# Patient Record
Sex: FEMALE | Race: WHITE | NOT HISPANIC OR LATINO | Employment: OTHER
[De-identification: names, ages, dates, MRNs, and addresses within clinical notes are randomized per-mention and may not be internally consistent; named-entity substitution may affect disease eponyms.]

---

## 2017-05-03 ENCOUNTER — RECORDS - HEALTHEAST (OUTPATIENT)
Dept: ADMINISTRATIVE | Facility: OTHER | Age: 67
End: 2017-05-03

## 2017-09-27 ENCOUNTER — RECORDS - HEALTHEAST (OUTPATIENT)
Dept: ADMINISTRATIVE | Facility: OTHER | Age: 67
End: 2017-09-27

## 2017-10-10 ENCOUNTER — RECORDS - HEALTHEAST (OUTPATIENT)
Dept: ADMINISTRATIVE | Facility: OTHER | Age: 67
End: 2017-10-10

## 2017-10-19 ENCOUNTER — RECORDS - HEALTHEAST (OUTPATIENT)
Dept: ADMINISTRATIVE | Facility: OTHER | Age: 67
End: 2017-10-19

## 2017-11-01 ENCOUNTER — RECORDS - HEALTHEAST (OUTPATIENT)
Dept: ADMINISTRATIVE | Facility: OTHER | Age: 67
End: 2017-11-01

## 2018-04-11 ENCOUNTER — RECORDS - HEALTHEAST (OUTPATIENT)
Dept: ADMINISTRATIVE | Facility: OTHER | Age: 68
End: 2018-04-11

## 2018-05-16 ENCOUNTER — RECORDS - HEALTHEAST (OUTPATIENT)
Dept: ADMINISTRATIVE | Facility: OTHER | Age: 68
End: 2018-05-16

## 2018-09-20 ENCOUNTER — OFFICE VISIT - HEALTHEAST (OUTPATIENT)
Dept: FAMILY MEDICINE | Facility: CLINIC | Age: 68
End: 2018-09-20

## 2018-09-20 DIAGNOSIS — H18.519 FUCHS' ENDOTHELIAL DYSTROPHY: ICD-10-CM

## 2018-09-20 DIAGNOSIS — B00.2 ORAL HERPES: ICD-10-CM

## 2018-09-20 DIAGNOSIS — E78.5 HYPERLIPIDEMIA: ICD-10-CM

## 2018-09-20 DIAGNOSIS — M19.90 OSTEOARTHRITIS: ICD-10-CM

## 2018-09-20 DIAGNOSIS — Z12.83 SKIN CANCER SCREENING: ICD-10-CM

## 2018-09-20 ASSESSMENT — MIFFLIN-ST. JEOR: SCORE: 1112.72

## 2018-10-04 ENCOUNTER — COMMUNICATION - HEALTHEAST (OUTPATIENT)
Dept: FAMILY MEDICINE | Facility: CLINIC | Age: 68
End: 2018-10-04

## 2018-11-02 ENCOUNTER — RECORDS - HEALTHEAST (OUTPATIENT)
Dept: ADMINISTRATIVE | Facility: OTHER | Age: 68
End: 2018-11-02

## 2018-11-06 ENCOUNTER — RECORDS - HEALTHEAST (OUTPATIENT)
Dept: ADMINISTRATIVE | Facility: OTHER | Age: 68
End: 2018-11-06

## 2018-11-07 ENCOUNTER — RECORDS - HEALTHEAST (OUTPATIENT)
Dept: ADMINISTRATIVE | Facility: OTHER | Age: 68
End: 2018-11-07

## 2018-11-16 ENCOUNTER — RECORDS - HEALTHEAST (OUTPATIENT)
Dept: ADMINISTRATIVE | Facility: OTHER | Age: 68
End: 2018-11-16

## 2018-12-18 ENCOUNTER — OFFICE VISIT - HEALTHEAST (OUTPATIENT)
Dept: FAMILY MEDICINE | Facility: CLINIC | Age: 68
End: 2018-12-18

## 2018-12-18 DIAGNOSIS — Z01.818 ENCOUNTER FOR PREOPERATIVE EXAMINATION FOR GENERAL SURGICAL PROCEDURE: ICD-10-CM

## 2018-12-18 DIAGNOSIS — M75.101 TEAR OF RIGHT ROTATOR CUFF, UNSPECIFIED TEAR EXTENT: ICD-10-CM

## 2018-12-18 LAB
ATRIAL RATE - MUSE: 59 BPM
DIASTOLIC BLOOD PRESSURE - MUSE: NORMAL MMHG
ERYTHROCYTE [DISTWIDTH] IN BLOOD BY AUTOMATED COUNT: 11.6 % (ref 11–14.5)
HCT VFR BLD AUTO: 37.9 % (ref 35–47)
HGB BLD-MCNC: 12.5 G/DL (ref 12–16)
INTERPRETATION ECG - MUSE: NORMAL
MCH RBC QN AUTO: 28.9 PG (ref 27–34)
MCHC RBC AUTO-ENTMCNC: 33 G/DL (ref 32–36)
MCV RBC AUTO: 88 FL (ref 80–100)
P AXIS - MUSE: 39 DEGREES
PLATELET # BLD AUTO: 406 THOU/UL (ref 140–440)
PMV BLD AUTO: 6.7 FL (ref 7–10)
PR INTERVAL - MUSE: 150 MS
QRS DURATION - MUSE: 86 MS
QT - MUSE: 422 MS
QTC - MUSE: 417 MS
R AXIS - MUSE: 18 DEGREES
RBC # BLD AUTO: 4.33 MILL/UL (ref 3.8–5.4)
SYSTOLIC BLOOD PRESSURE - MUSE: NORMAL MMHG
T AXIS - MUSE: 48 DEGREES
VENTRICULAR RATE- MUSE: 59 BPM
WBC: 8.3 THOU/UL (ref 4–11)

## 2018-12-18 ASSESSMENT — MIFFLIN-ST. JEOR: SCORE: 1137.67

## 2018-12-19 ENCOUNTER — COMMUNICATION - HEALTHEAST (OUTPATIENT)
Dept: FAMILY MEDICINE | Facility: CLINIC | Age: 68
End: 2018-12-19

## 2019-01-02 ENCOUNTER — RECORDS - HEALTHEAST (OUTPATIENT)
Dept: ADMINISTRATIVE | Facility: OTHER | Age: 69
End: 2019-01-02

## 2019-01-11 ENCOUNTER — RECORDS - HEALTHEAST (OUTPATIENT)
Dept: ADMINISTRATIVE | Facility: OTHER | Age: 69
End: 2019-01-11

## 2019-02-12 ENCOUNTER — RECORDS - HEALTHEAST (OUTPATIENT)
Dept: ADMINISTRATIVE | Facility: OTHER | Age: 69
End: 2019-02-12

## 2019-04-09 ENCOUNTER — RECORDS - HEALTHEAST (OUTPATIENT)
Dept: ADMINISTRATIVE | Facility: OTHER | Age: 69
End: 2019-04-09

## 2019-04-11 ENCOUNTER — COMMUNICATION - HEALTHEAST (OUTPATIENT)
Dept: FAMILY MEDICINE | Facility: CLINIC | Age: 69
End: 2019-04-11

## 2019-04-11 DIAGNOSIS — Z12.31 ENCOUNTER FOR SCREENING MAMMOGRAM FOR BREAST CANCER: ICD-10-CM

## 2019-04-26 ENCOUNTER — RECORDS - HEALTHEAST (OUTPATIENT)
Dept: ADMINISTRATIVE | Facility: OTHER | Age: 69
End: 2019-04-26

## 2019-04-29 ENCOUNTER — COMMUNICATION - HEALTHEAST (OUTPATIENT)
Dept: FAMILY MEDICINE | Facility: CLINIC | Age: 69
End: 2019-04-29

## 2019-04-29 DIAGNOSIS — B00.2 ORAL HERPES: ICD-10-CM

## 2019-05-21 ENCOUNTER — HOSPITAL ENCOUNTER (OUTPATIENT)
Dept: MAMMOGRAPHY | Facility: CLINIC | Age: 69
Discharge: HOME | End: 2019-05-21
Attending: NURSE PRACTITIONER

## 2019-05-21 DIAGNOSIS — Z12.31 ENCOUNTER FOR SCREENING MAMMOGRAM FOR BREAST CANCER: ICD-10-CM

## 2019-07-12 ENCOUNTER — RECORDS - HEALTHEAST (OUTPATIENT)
Dept: ADMINISTRATIVE | Facility: OTHER | Age: 69
End: 2019-07-12

## 2019-08-09 ENCOUNTER — RECORDS - HEALTHEAST (OUTPATIENT)
Dept: ADMINISTRATIVE | Facility: OTHER | Age: 69
End: 2019-08-09

## 2019-08-09 ENCOUNTER — RECORDS - HEALTHEAST (OUTPATIENT)
Dept: RADIOLOGY | Facility: CLINIC | Age: 69
End: 2019-08-09

## 2019-09-10 ENCOUNTER — OFFICE VISIT - HEALTHEAST (OUTPATIENT)
Dept: FAMILY MEDICINE | Facility: CLINIC | Age: 69
End: 2019-09-10

## 2019-09-10 DIAGNOSIS — Q76.49 SACRALIZATION OF LUMBAR VERTEBRA: ICD-10-CM

## 2019-09-10 DIAGNOSIS — E78.2 MIXED HYPERLIPIDEMIA: ICD-10-CM

## 2019-09-10 DIAGNOSIS — Z13.1 SCREENING FOR DIABETES MELLITUS: ICD-10-CM

## 2019-09-10 DIAGNOSIS — Z13.21 ENCOUNTER FOR VITAMIN DEFICIENCY SCREENING: ICD-10-CM

## 2019-09-10 DIAGNOSIS — D17.30 LIPOMA OF SKIN AND SUBCUTANEOUS TISSUE: ICD-10-CM

## 2019-09-10 DIAGNOSIS — Z00.00 ROUTINE GENERAL MEDICAL EXAMINATION AT A HEALTH CARE FACILITY: ICD-10-CM

## 2019-09-10 DIAGNOSIS — Z23 ENCOUNTER FOR IMMUNIZATION: ICD-10-CM

## 2019-09-10 DIAGNOSIS — R01.1 HEART MURMUR, SYSTOLIC: ICD-10-CM

## 2019-09-10 DIAGNOSIS — R63.5 WEIGHT GAIN: ICD-10-CM

## 2019-09-10 DIAGNOSIS — Z71.85 IMMUNIZATION COUNSELING: ICD-10-CM

## 2019-09-10 ASSESSMENT — MIFFLIN-ST. JEOR: SCORE: 1132.57

## 2019-09-11 ENCOUNTER — AMBULATORY - HEALTHEAST (OUTPATIENT)
Dept: LAB | Facility: CLINIC | Age: 69
End: 2019-09-11

## 2019-09-11 DIAGNOSIS — Z71.85 IMMUNIZATION COUNSELING: ICD-10-CM

## 2019-09-11 DIAGNOSIS — Z13.1 SCREENING FOR DIABETES MELLITUS: ICD-10-CM

## 2019-09-11 DIAGNOSIS — E78.2 MIXED HYPERLIPIDEMIA: ICD-10-CM

## 2019-09-11 DIAGNOSIS — R63.5 WEIGHT GAIN: ICD-10-CM

## 2019-09-11 DIAGNOSIS — Z13.21 ENCOUNTER FOR VITAMIN DEFICIENCY SCREENING: ICD-10-CM

## 2019-09-11 LAB
ALBUMIN SERPL-MCNC: 4 G/DL (ref 3.5–5)
ALP SERPL-CCNC: 91 U/L (ref 45–120)
ALT SERPL W P-5'-P-CCNC: 26 U/L (ref 0–45)
ANION GAP SERPL CALCULATED.3IONS-SCNC: 8 MMOL/L (ref 5–18)
AST SERPL W P-5'-P-CCNC: 22 U/L (ref 0–40)
BILIRUB SERPL-MCNC: 0.4 MG/DL (ref 0–1)
BUN SERPL-MCNC: 16 MG/DL (ref 8–22)
CALCIUM SERPL-MCNC: 9.9 MG/DL (ref 8.5–10.5)
CHLORIDE BLD-SCNC: 109 MMOL/L (ref 98–107)
CHOLEST SERPL-MCNC: 205 MG/DL
CO2 SERPL-SCNC: 27 MMOL/L (ref 22–31)
CREAT SERPL-MCNC: 0.71 MG/DL (ref 0.6–1.1)
FASTING STATUS PATIENT QL REPORTED: YES
GFR SERPL CREATININE-BSD FRML MDRD: >60 ML/MIN/1.73M2
GLUCOSE BLD-MCNC: 92 MG/DL (ref 70–125)
HDLC SERPL-MCNC: 63 MG/DL
LDLC SERPL CALC-MCNC: 99 MG/DL
POTASSIUM BLD-SCNC: 5 MMOL/L (ref 3.5–5)
PROT SERPL-MCNC: 6.7 G/DL (ref 6–8)
SODIUM SERPL-SCNC: 144 MMOL/L (ref 136–145)
TRIGL SERPL-MCNC: 217 MG/DL
TSH SERPL DL<=0.005 MIU/L-ACNC: 1.31 UIU/ML (ref 0.3–5)

## 2019-09-12 LAB
25(OH)D3 SERPL-MCNC: 40.8 NG/ML (ref 30–80)
25(OH)D3 SERPL-MCNC: 40.8 NG/ML (ref 30–80)

## 2019-09-13 ENCOUNTER — COMMUNICATION - HEALTHEAST (OUTPATIENT)
Dept: FAMILY MEDICINE | Facility: CLINIC | Age: 69
End: 2019-09-13

## 2019-09-13 LAB — HAV IGG SER QL IA: POSITIVE

## 2019-09-17 ENCOUNTER — AMBULATORY - HEALTHEAST (OUTPATIENT)
Dept: FAMILY MEDICINE | Facility: CLINIC | Age: 69
End: 2019-09-17

## 2019-09-17 ENCOUNTER — COMMUNICATION - HEALTHEAST (OUTPATIENT)
Dept: FAMILY MEDICINE | Facility: CLINIC | Age: 69
End: 2019-09-17

## 2019-09-17 ENCOUNTER — HOSPITAL ENCOUNTER (OUTPATIENT)
Dept: CARDIOLOGY | Facility: CLINIC | Age: 69
Discharge: HOME | End: 2019-09-17
Attending: NURSE PRACTITIONER

## 2019-09-17 DIAGNOSIS — H91.93 BILATERAL HEARING LOSS, UNSPECIFIED HEARING LOSS TYPE: ICD-10-CM

## 2019-09-17 DIAGNOSIS — R01.1 HEART MURMUR, SYSTOLIC: ICD-10-CM

## 2019-09-17 DIAGNOSIS — I35.0 NONRHEUMATIC AORTIC VALVE STENOSIS: ICD-10-CM

## 2019-09-17 DIAGNOSIS — D17.30 LIPOMA OF SKIN AND SUBCUTANEOUS TISSUE: ICD-10-CM

## 2019-09-17 LAB
AORTIC ROOT: 3 CM
AORTIC VALVE MEAN VELOCITY: 170 CM/S
AV DIMENSIONLESS INDEX VTI: 0.4
AV MEAN GRADIENT: 13 MMHG
AV PEAK GRADIENT: 24.2 MMHG
AV VALVE AREA: 0.9 CM2
AV VELOCITY RATIO: 0.4
BSA FOR ECHO PROCEDURE: 1.71 M2
CV BLOOD PRESSURE: NORMAL MMHG
CV ECHO HEIGHT: 59 IN
CV ECHO WEIGHT: 155 LBS
DOP CALC AO PEAK VEL: 246 CM/S
DOP CALC AO VTI: 58.6 CM
DOP CALC LVOT AREA: 2.01 CM2
DOP CALC LVOT DIAMETER: 1.6 CM
DOP CALC LVOT PEAK VEL: 97.8 CM/S
DOP CALC LVOT STROKE VOLUME: 51 CM3
DOP CALCLVOT PEAK VEL VTI: 25.4 CM
EJECTION FRACTION: 72 % (ref 55–75)
FRACTIONAL SHORTENING: 33.4 % (ref 28–44)
INTERVENTRICULAR SEPTUM IN END DIASTOLE: 0.88 CM (ref 0.6–0.9)
IVS/PW RATIO: 1.3
LA AREA 1: 18.4 CM2
LA AREA 2: 18.2 CM2
LEFT ATRIUM LENGTH: 5.34 CM
LEFT ATRIUM SIZE: 3.4 CM
LEFT ATRIUM TO AORTIC ROOT RATIO: 1.13 NO UNITS
LEFT ATRIUM VOLUME INDEX: 31.2 ML/M2
LEFT ATRIUM VOLUME: 53.3 ML
LEFT VENTRICLE CARDIAC INDEX: 1.9 L/MIN/M2
LEFT VENTRICLE CARDIAC OUTPUT: 3.3 L/MIN
LEFT VENTRICLE DIASTOLIC VOLUME INDEX: 48.5 CM3/M2 (ref 29–61)
LEFT VENTRICLE DIASTOLIC VOLUME: 83 CM3 (ref 46–106)
LEFT VENTRICLE HEART RATE: 64 BPM
LEFT VENTRICLE MASS INDEX: 66.6 G/M2
LEFT VENTRICLE SYSTOLIC VOLUME INDEX: 13.5 CM3/M2 (ref 8–24)
LEFT VENTRICLE SYSTOLIC VOLUME: 23 CM3 (ref 14–42)
LEFT VENTRICULAR INTERNAL DIMENSION IN DIASTOLE: 4.55 CM (ref 3.8–5.2)
LEFT VENTRICULAR INTERNAL DIMENSION IN SYSTOLE: 3.03 CM (ref 2.2–3.5)
LEFT VENTRICULAR MASS: 113.9 G
LEFT VENTRICULAR OUTFLOW TRACT MEAN GRADIENT: 2 MMHG
LEFT VENTRICULAR OUTFLOW TRACT MEAN VELOCITY: 70.2 CM/S
LEFT VENTRICULAR OUTFLOW TRACT PEAK GRADIENT: 4 MMHG
LEFT VENTRICULAR POSTERIOR WALL IN END DIASTOLE: 0.7 CM (ref 0.6–0.9)
LV STROKE VOLUME INDEX: 29.9 ML/M2
MITRAL VALVE E/A RATIO: 1.2
MV AVERAGE E/E' RATIO: 9.7 CM/S
MV DECELERATION TIME: 208 MS
MV E'TISSUE VEL-LAT: 11.8 CM/S
MV E'TISSUE VEL-MED: 7.31 CM/S
MV LATERAL E/E' RATIO: 7.9
MV MEDIAL E/E' RATIO: 12.7
MV PEAK A VELOCITY: 75 CM/S
MV PEAK E VELOCITY: 92.8 CM/S
NUC REST DIASTOLIC VOLUME INDEX: 2476 LBS
NUC REST SYSTOLIC VOLUME INDEX: 59 IN
PR MAX PG: 2 MMHG
PR PEAK VELOCITY: 80.1 CM/S
TRICUSPID REGURGITATION PEAK PRESSURE GRADIENT: 17.3 MMHG
TRICUSPID VALVE ANULAR PLANE SYSTOLIC EXCURSION: 2.3 CM
TRICUSPID VALVE PEAK REGURGITANT VELOCITY: 208 CM/S

## 2019-09-17 ASSESSMENT — MIFFLIN-ST. JEOR: SCORE: 1132.57

## 2019-09-24 ENCOUNTER — TELEPHONE (OUTPATIENT)
Dept: SURGERY | Facility: CLINIC | Age: 69
End: 2019-09-24

## 2019-09-24 ENCOUNTER — PREP FOR PROCEDURE (OUTPATIENT)
Dept: SURGERY | Facility: CLINIC | Age: 69
End: 2019-09-24

## 2019-09-24 ENCOUNTER — OFFICE VISIT (OUTPATIENT)
Dept: SURGERY | Facility: CLINIC | Age: 69
End: 2019-09-24
Payer: COMMERCIAL

## 2019-09-24 VITALS
HEIGHT: 59 IN | BODY MASS INDEX: 30.24 KG/M2 | HEART RATE: 86 BPM | SYSTOLIC BLOOD PRESSURE: 112 MMHG | OXYGEN SATURATION: 97 % | WEIGHT: 150 LBS | DIASTOLIC BLOOD PRESSURE: 68 MMHG

## 2019-09-24 DIAGNOSIS — D17.30 LIPOMA OF SKIN AND SUBCUTANEOUS TISSUE: Primary | ICD-10-CM

## 2019-09-24 DIAGNOSIS — R22.2 CHEST MASS: Primary | ICD-10-CM

## 2019-09-24 PROCEDURE — 99203 OFFICE O/P NEW LOW 30 MIN: CPT | Performed by: SURGERY

## 2019-09-24 RX ORDER — ROSUVASTATIN CALCIUM 10 MG/1
10 TABLET, COATED ORAL EVERY OTHER DAY
COMMUNITY
Start: 2018-09-20 | End: 2022-02-09

## 2019-09-24 RX ORDER — OMEGA-3S/DHA/EPA/FISH OIL/D3 300MG-1000
CAPSULE ORAL
COMMUNITY
End: 2019-11-04

## 2019-09-24 ASSESSMENT — MIFFLIN-ST. JEOR: SCORE: 1116.03

## 2019-09-24 NOTE — PROGRESS NOTES
"Assessment:    Pamela Engle is a 69 year old female seen in consultation for a subcutaneous mass on her upper torso, likely a lipoma., at the request of Jeremy Tucker RN.      Plan:  I have offered Ms. Engle continued observation versus excision and she elects to proceed with excision.  Due to the size and location of the subcutaneous mass, I recommended excision in the operating room as opposed to the clinic.        HPI:  Pamela Engle is a 69 year old female who presents today in consultation for a subcutaneous mass overlying her sternum which has been growing in size over the past year.  It is painless, mobile, and has no overlying skin changes.  She has a history of a lipoma on her left shoulder which was excised approximately 7 years ago and this feels very similar to her.     Duration:  1 year   H/o trauma:  No  Other such masses now or in the past:  Yes: right posterior shoulder lipoma excised 2012  Pain:  No  Size:  slowly increasing, currently ~ 4 cm in diameter and 1 cm in depth.     PMH:  Aortic stenosis  Hyperlipidemia  Oral herpes simplex  Osteoarthritis    PSH:  Lipoma excision, right posterior shoulder    Allergies:  No known allergies    Home Medications:  Rosuvastatin  Vitamin D  Valacyclovir    Social History:  Social History     Tobacco Use     Smoking status: Former Smoker     Packs/day: 0.00     Smokeless tobacco: Never Used   Substance Use Topics     Alcohol use: Yes     Drug use: Never     Former smoker, quit 2010    Family History:  No anesthesia reactions or bleeding disorders    ROS:  The 10 point review of systems is negative other than noted in the HPI and above.    PE:  /68   Pulse 86   Ht 1.499 m (4' 11\")   Wt 68 kg (150 lb)   SpO2 97%   BMI 30.30 kg/m    General appearance: well-nourished, no apparent distress  HEENT:  Head normocephalic and atraumatic, pupils equal and round, conjunctivae clear, no scleral icterus, mucous membranes moist, external ears and nose " normal  Lungs: respirations unlabored  Neurologic: alert, speech is clear, moves all extremities with good strength  Psychiatric: mood and affect are appropriate  Musculoskeletal:  Normal station and gait  Skin: There is a ~ 4 x 4 x 1 cm mass overlying her upper sternum which is soft, mobile, and nontender.  There is no surrounding cellulitis.       Karla Vasquez MD      Please route or send letter to:  Primary Care Provider (PCP) and Referring Provider

## 2019-09-24 NOTE — TELEPHONE ENCOUNTER
Type of surgery: EXCISION OF SUBCUTANEOUS MASS UPPER CHEST MIDLINE   Location of surgery: [facility]  Date and time of surgery: 11/5/2019 @ 9:30 AM   Surgeon: Karla Vasquez MD   Pre-Op Appt Date: PATIENT TO SCHEDULE    Post-Op Appt Date: PATIENT TO SCHEDULE     Packet sent out: Yes  PACKET AND SOAP GIVEN TO PATIENT    Pre-cert/Authorization completed:  Not Applicable  Date: 9/24/2019  UPDATED 9/27/2019     EXCISION OF SUBCUTANEOUS MASS UPPER CHEST  MIDLINE     MAC PT INST TO HAVE H&P WITH DR BRITO 60 MIN REQ  NON MGB NMS

## 2019-10-04 ENCOUNTER — OFFICE VISIT - HEALTHEAST (OUTPATIENT)
Dept: AUDIOLOGY | Facility: CLINIC | Age: 69
End: 2019-10-04

## 2019-10-04 DIAGNOSIS — Z01.110 ENCOUNTER FOR HEARING EXAMINATION FOLLOWING FAILED HEARING SCREENING: ICD-10-CM

## 2019-10-08 ENCOUNTER — OFFICE VISIT - HEALTHEAST (OUTPATIENT)
Dept: CARDIOLOGY | Facility: CLINIC | Age: 69
End: 2019-10-08

## 2019-10-08 DIAGNOSIS — I35.0 NONRHEUMATIC AORTIC VALVE STENOSIS: ICD-10-CM

## 2019-10-31 ENCOUNTER — OFFICE VISIT - HEALTHEAST (OUTPATIENT)
Dept: FAMILY MEDICINE | Facility: CLINIC | Age: 69
End: 2019-10-31

## 2019-10-31 DIAGNOSIS — Z01.818 ENCOUNTER FOR PREOPERATIVE EXAMINATION FOR GENERAL SURGICAL PROCEDURE: ICD-10-CM

## 2019-10-31 DIAGNOSIS — D17.30 LIPOMA OF SKIN AND SUBCUTANEOUS TISSUE: ICD-10-CM

## 2019-10-31 DIAGNOSIS — I35.0 NONRHEUMATIC AORTIC VALVE STENOSIS: ICD-10-CM

## 2019-10-31 LAB
ATRIAL RATE - MUSE: 59 BPM
DIASTOLIC BLOOD PRESSURE - MUSE: NORMAL
HGB BLD-MCNC: 13.4 G/DL (ref 12–16)
INTERPRETATION ECG - MUSE: NORMAL
P AXIS - MUSE: 46 DEGREES
PR INTERVAL - MUSE: 148 MS
QRS DURATION - MUSE: 84 MS
QT - MUSE: 430 MS
QTC - MUSE: 425 MS
R AXIS - MUSE: 15 DEGREES
SYSTOLIC BLOOD PRESSURE - MUSE: NORMAL
T AXIS - MUSE: 56 DEGREES
VENTRICULAR RATE- MUSE: 59 BPM

## 2019-10-31 ASSESSMENT — MIFFLIN-ST. JEOR: SCORE: 1149.58

## 2019-11-04 RX ORDER — VALACYCLOVIR HYDROCHLORIDE 1 G/1
2000 TABLET, FILM COATED ORAL 2 TIMES DAILY PRN
COMMUNITY
End: 2021-08-10

## 2019-11-04 RX ORDER — ACETAMINOPHEN 160 MG
TABLET,DISINTEGRATING ORAL DAILY
COMMUNITY

## 2019-11-04 RX ORDER — IBUPROFEN 800 MG/1
800 TABLET, FILM COATED ORAL EVERY 8 HOURS PRN
Status: ON HOLD | COMMUNITY
End: 2019-11-05

## 2019-11-04 RX ORDER — ACETAMINOPHEN 500 MG
500 TABLET ORAL EVERY 6 HOURS PRN
Status: ON HOLD | COMMUNITY
End: 2019-11-05

## 2019-11-05 ENCOUNTER — APPOINTMENT (OUTPATIENT)
Dept: SURGERY | Facility: PHYSICIAN GROUP | Age: 69
End: 2019-11-05
Payer: COMMERCIAL

## 2019-11-05 ENCOUNTER — ANESTHESIA EVENT (OUTPATIENT)
Dept: SURGERY | Facility: CLINIC | Age: 69
End: 2019-11-05
Payer: COMMERCIAL

## 2019-11-05 ENCOUNTER — ANESTHESIA (OUTPATIENT)
Dept: SURGERY | Facility: CLINIC | Age: 69
End: 2019-11-05
Payer: COMMERCIAL

## 2019-11-05 ENCOUNTER — HOSPITAL ENCOUNTER (OUTPATIENT)
Facility: CLINIC | Age: 69
Discharge: HOME | End: 2019-11-05
Attending: SURGERY | Admitting: SURGERY
Payer: COMMERCIAL

## 2019-11-05 VITALS
RESPIRATION RATE: 14 BRPM | SYSTOLIC BLOOD PRESSURE: 125 MMHG | BODY MASS INDEX: 31.25 KG/M2 | OXYGEN SATURATION: 100 % | WEIGHT: 155 LBS | TEMPERATURE: 98.3 F | HEIGHT: 59 IN | DIASTOLIC BLOOD PRESSURE: 77 MMHG

## 2019-11-05 DIAGNOSIS — R22.2 CHEST MASS: ICD-10-CM

## 2019-11-05 PROCEDURE — 25000128 H RX IP 250 OP 636: Performed by: PHYSICIAN ASSISTANT

## 2019-11-05 PROCEDURE — 21552 EXC NECK LES SC 3 CM/>: CPT | Performed by: SURGERY

## 2019-11-05 PROCEDURE — 25000125 ZZHC RX 250: Performed by: SURGERY

## 2019-11-05 PROCEDURE — 37000008 ZZH ANESTHESIA TECHNICAL FEE, 1ST 30 MIN: Performed by: SURGERY

## 2019-11-05 PROCEDURE — 25000128 H RX IP 250 OP 636: Performed by: NURSE ANESTHETIST, CERTIFIED REGISTERED

## 2019-11-05 PROCEDURE — 40000306 ZZH STATISTIC PRE PROC ASSESS II: Performed by: SURGERY

## 2019-11-05 PROCEDURE — 25000132 ZZH RX MED GY IP 250 OP 250 PS 637: Performed by: SURGERY

## 2019-11-05 PROCEDURE — 25800030 ZZH RX IP 258 OP 636: Performed by: ANESTHESIOLOGY

## 2019-11-05 PROCEDURE — 37000009 ZZH ANESTHESIA TECHNICAL FEE, EACH ADDTL 15 MIN: Performed by: SURGERY

## 2019-11-05 PROCEDURE — 88304 TISSUE EXAM BY PATHOLOGIST: CPT | Performed by: SURGERY

## 2019-11-05 PROCEDURE — 36000052 ZZH SURGERY LEVEL 2 EA 15 ADDTL MIN: Performed by: SURGERY

## 2019-11-05 PROCEDURE — 36000050 ZZH SURGERY LEVEL 2 1ST 30 MIN: Performed by: SURGERY

## 2019-11-05 PROCEDURE — 27210794 ZZH OR GENERAL SUPPLY STERILE: Performed by: SURGERY

## 2019-11-05 PROCEDURE — 88304 TISSUE EXAM BY PATHOLOGIST: CPT | Mod: 26 | Performed by: SURGERY

## 2019-11-05 PROCEDURE — 25000128 H RX IP 250 OP 636: Performed by: SURGERY

## 2019-11-05 PROCEDURE — 25000125 ZZHC RX 250: Performed by: NURSE ANESTHETIST, CERTIFIED REGISTERED

## 2019-11-05 PROCEDURE — 71000027 ZZH RECOVERY PHASE 2 EACH 15 MINS: Performed by: SURGERY

## 2019-11-05 RX ORDER — FENTANYL CITRATE 50 UG/ML
25-50 INJECTION, SOLUTION INTRAMUSCULAR; INTRAVENOUS
Status: DISCONTINUED | OUTPATIENT
Start: 2019-11-05 | End: 2019-11-05 | Stop reason: HOSPADM

## 2019-11-05 RX ORDER — ONDANSETRON 4 MG/1
4 TABLET, ORALLY DISINTEGRATING ORAL EVERY 30 MIN PRN
Status: DISCONTINUED | OUTPATIENT
Start: 2019-11-05 | End: 2019-11-05 | Stop reason: HOSPADM

## 2019-11-05 RX ORDER — CEFAZOLIN SODIUM 2 G/100ML
2 INJECTION, SOLUTION INTRAVENOUS
Status: COMPLETED | OUTPATIENT
Start: 2019-11-05 | End: 2019-11-05

## 2019-11-05 RX ORDER — OXYCODONE HYDROCHLORIDE 5 MG/1
5-10 TABLET ORAL EVERY 4 HOURS PRN
Qty: 6 TABLET | Refills: 0 | Status: SHIPPED | OUTPATIENT
Start: 2019-11-05 | End: 2019-11-19

## 2019-11-05 RX ORDER — LIDOCAINE 40 MG/G
CREAM TOPICAL
Status: DISCONTINUED | OUTPATIENT
Start: 2019-11-05 | End: 2019-11-05 | Stop reason: HOSPADM

## 2019-11-05 RX ORDER — PROPOFOL 10 MG/ML
INJECTION, EMULSION INTRAVENOUS PRN
Status: DISCONTINUED | OUTPATIENT
Start: 2019-11-05 | End: 2019-11-05

## 2019-11-05 RX ORDER — CEFAZOLIN SODIUM 1 G/3ML
1 INJECTION, POWDER, FOR SOLUTION INTRAMUSCULAR; INTRAVENOUS SEE ADMIN INSTRUCTIONS
Status: DISCONTINUED | OUTPATIENT
Start: 2019-11-05 | End: 2019-11-05 | Stop reason: HOSPADM

## 2019-11-05 RX ORDER — ACETAMINOPHEN 325 MG/1
650 TABLET ORAL
Status: DISCONTINUED | OUTPATIENT
Start: 2019-11-05 | End: 2019-11-05 | Stop reason: HOSPADM

## 2019-11-05 RX ORDER — SODIUM CHLORIDE, SODIUM LACTATE, POTASSIUM CHLORIDE, CALCIUM CHLORIDE 600; 310; 30; 20 MG/100ML; MG/100ML; MG/100ML; MG/100ML
INJECTION, SOLUTION INTRAVENOUS CONTINUOUS
Status: DISCONTINUED | OUTPATIENT
Start: 2019-11-05 | End: 2019-11-05 | Stop reason: HOSPADM

## 2019-11-05 RX ORDER — NALOXONE HYDROCHLORIDE 0.4 MG/ML
.1-.4 INJECTION, SOLUTION INTRAMUSCULAR; INTRAVENOUS; SUBCUTANEOUS
Status: DISCONTINUED | OUTPATIENT
Start: 2019-11-05 | End: 2019-11-05 | Stop reason: HOSPADM

## 2019-11-05 RX ORDER — ONDANSETRON 2 MG/ML
4 INJECTION INTRAMUSCULAR; INTRAVENOUS EVERY 30 MIN PRN
Status: DISCONTINUED | OUTPATIENT
Start: 2019-11-05 | End: 2019-11-05 | Stop reason: HOSPADM

## 2019-11-05 RX ORDER — FENTANYL CITRATE 50 UG/ML
INJECTION, SOLUTION INTRAMUSCULAR; INTRAVENOUS PRN
Status: DISCONTINUED | OUTPATIENT
Start: 2019-11-05 | End: 2019-11-05

## 2019-11-05 RX ORDER — OXYCODONE HYDROCHLORIDE 5 MG/1
10 TABLET ORAL
Status: COMPLETED | OUTPATIENT
Start: 2019-11-05 | End: 2019-11-05

## 2019-11-05 RX ORDER — ACETAMINOPHEN 500 MG
1000 TABLET ORAL EVERY 6 HOURS PRN
Qty: 50 TABLET | Refills: 0 | Status: SHIPPED | OUTPATIENT
Start: 2019-11-05 | End: 2023-04-06

## 2019-11-05 RX ORDER — MEPERIDINE HYDROCHLORIDE 25 MG/ML
12.5 INJECTION INTRAMUSCULAR; INTRAVENOUS; SUBCUTANEOUS
Status: DISCONTINUED | OUTPATIENT
Start: 2019-11-05 | End: 2019-11-05 | Stop reason: HOSPADM

## 2019-11-05 RX ORDER — IBUPROFEN 200 MG
600 TABLET ORAL EVERY 6 HOURS PRN
Qty: 50 TABLET | Refills: 0 | Status: SHIPPED | OUTPATIENT
Start: 2019-11-05

## 2019-11-05 RX ORDER — PROPOFOL 10 MG/ML
INJECTION, EMULSION INTRAVENOUS CONTINUOUS PRN
Status: DISCONTINUED | OUTPATIENT
Start: 2019-11-05 | End: 2019-11-05

## 2019-11-05 RX ADMIN — PROPOFOL 10 MG: 10 INJECTION, EMULSION INTRAVENOUS at 09:39

## 2019-11-05 RX ADMIN — PROPOFOL 20 MG: 10 INJECTION, EMULSION INTRAVENOUS at 09:37

## 2019-11-05 RX ADMIN — MIDAZOLAM 2 MG: 1 INJECTION INTRAMUSCULAR; INTRAVENOUS at 09:11

## 2019-11-05 RX ADMIN — PROPOFOL 20 MG: 10 INJECTION, EMULSION INTRAVENOUS at 09:29

## 2019-11-05 RX ADMIN — PROPOFOL 20 MG: 10 INJECTION, EMULSION INTRAVENOUS at 09:23

## 2019-11-05 RX ADMIN — OXYCODONE HYDROCHLORIDE 5 MG: 5 TABLET ORAL at 11:24

## 2019-11-05 RX ADMIN — PROPOFOL 50 MCG/KG/MIN: 10 INJECTION, EMULSION INTRAVENOUS at 09:27

## 2019-11-05 RX ADMIN — FENTANYL CITRATE 50 MCG: 50 INJECTION, SOLUTION INTRAMUSCULAR; INTRAVENOUS at 09:33

## 2019-11-05 RX ADMIN — PROPOFOL 20 MG: 10 INJECTION, EMULSION INTRAVENOUS at 09:20

## 2019-11-05 RX ADMIN — PROPOFOL 15 MG: 10 INJECTION, EMULSION INTRAVENOUS at 09:53

## 2019-11-05 RX ADMIN — SODIUM CHLORIDE, POTASSIUM CHLORIDE, SODIUM LACTATE AND CALCIUM CHLORIDE: 600; 310; 30; 20 INJECTION, SOLUTION INTRAVENOUS at 08:55

## 2019-11-05 RX ADMIN — SODIUM CHLORIDE, POTASSIUM CHLORIDE, SODIUM LACTATE AND CALCIUM CHLORIDE: 600; 310; 30; 20 INJECTION, SOLUTION INTRAVENOUS at 09:40

## 2019-11-05 RX ADMIN — PROPOFOL 20 MG: 10 INJECTION, EMULSION INTRAVENOUS at 09:33

## 2019-11-05 RX ADMIN — PROPOFOL 20 MG: 10 INJECTION, EMULSION INTRAVENOUS at 09:45

## 2019-11-05 RX ADMIN — CEFAZOLIN SODIUM 2 G: 2 INJECTION, SOLUTION INTRAVENOUS at 09:11

## 2019-11-05 RX ADMIN — FENTANYL CITRATE 50 MCG: 50 INJECTION, SOLUTION INTRAMUSCULAR; INTRAVENOUS at 09:17

## 2019-11-05 ASSESSMENT — MIFFLIN-ST. JEOR: SCORE: 1138.71

## 2019-11-05 NOTE — ANESTHESIA POSTPROCEDURE EVALUATION
Patient: Pamela Engle    Procedure(s):  EXCISION SUBCUTANEOUS MASS UPPER CHEST MIDLINE    Diagnosis:Chest mass [R22.2]  Diagnosis Additional Information: No value filed.    Anesthesia Type:  MAC    Note:  Anesthesia Post Evaluation    Patient location during evaluation: PACU  Patient participation: Able to fully participate in evaluation  Level of consciousness: awake  Pain management: adequate  Airway patency: patent  Cardiovascular status: acceptable  Respiratory status: acceptable  Hydration status: euvolemic  PONV: controlled     Anesthetic complications: None          Last vitals:  Vitals:    11/05/19 1104 11/05/19 1113 11/05/19 1125   BP:  139/74 128/76   Resp:      Temp:      SpO2: 100%  100%         Electronically Signed By: Guru Trammell MD  November 5, 2019  12:02 PM

## 2019-11-05 NOTE — ANESTHESIA CARE TRANSFER NOTE
Patient: Pamela Engle    Procedure(s):  EXCISION SUBCUTANEOUS MASS UPPER CHEST MIDLINE    Diagnosis: Chest mass [R22.2]  Diagnosis Additional Information: No value filed.    Anesthesia Type:   MAC     Note:  Airway :Room Air  Patient transferred to:Phase II  Comments: Patients meets criteria for phase 2 recovery. VSS. Report to RN      Vitals: (Last set prior to Anesthesia Care Transfer)    CRNA VITALS  11/5/2019 0944 - 11/5/2019 1021      11/5/2019             Pulse:  63    SpO2:  98 %    Resp Rate (observed):  (!) 1                Electronically Signed By: LA Cooper CRNA  November 5, 2019  10:21 AM

## 2019-11-05 NOTE — OP NOTE
General Surgery Operative Note      Pre-operative diagnosis: Midline upper chest subcutaneous mass   Post-operative diagnosis: Same    Surgeon: Karla Vasquez MD   Assistant(s): NONE   Anesthesia: Local with MAC    Estimated blood loss: 20 cc     Specimens: ID Type Source Tests Collected by Time Destination   A : UPPER CHEST SUBCUTANEOUS MASS Tissue Chest SURGICAL PATHOLOGY EXAM Karla Vasquez MD 11/5/2019  9:54 AM           The upper chest was prepped and draped in standard sterile fashion.  The skin overlying the mass was anesthetized with local anesthetic.  An approximately 3 cm incision was made overlying the mass.  The incision was carried into the subcutaneous tissue until a discrete, smooth fatty mass was encountered. The mass was dissected circumferentially. The mass did not dissect out easily, as would be expected with a lipoma. Rather it seemed to be fixed the fascia of the bilateral pectoralis muscles. A small amount of fascia from each side was excised with the specimen. Additionally, there was a feeding arterial vessel which was ligated at the right inferior aspect of the mass.  The mass, which measured ~4.5 cm in diameter and 1 cm in depth was then passed off the field as specimen.  Hemostasis was maintained throughout with electrocautery.  The wound was then irrigated with sterile saline and closed in layers with interrupted 3-0 vicryl and with running 4-0 Vicryl subcuticular sutures and a Steri-Strip.  The patient tolerated the procedure well.  Sponge and needle counts were correct at the end of the case.     Karla Vasquez MD

## 2019-11-05 NOTE — ANESTHESIA PREPROCEDURE EVALUATION
Anesthesia Pre-Procedure Evaluation    Patient: Pamela Engle   MRN: [id number] : 1950          Preoperative Diagnosis: Chest mass [R22.2]    Procedure(s):  EXCISION SUBCUTANEOUS MASS UPPER CHEST (MIDLINE)    Past Medical History:   Diagnosis Date     Anemia     after surgery     Aortic stenosis     cardiology visit follow up due Fall of      Arthritis      Complication of anesthesia      Gastroesophageal reflux disease     rarely     Hepatitis     hepatitis A as a child     Past Surgical History:   Procedure Laterality Date     ARTHROSCOPY KNEE Left 2003     ARTHROSCOPY KNEE Right 2010     BIOPSY  2001    bone marrow donator     BUNIONECTOMY PORTER Bilateral     and hammer toe surgery     ENT SURGERY  's     EXCISE CASTANO'S NEUROMA FOOT  2007 and  redo 2009     GYN SURGERY  1984    tubal ligation     ORTHOPEDIC SURGERY Right 2019    rotator cuff repair     SOFT TISSUE SURGERY  2012    lipoma removed from back     Anesthesia Evaluation     . Pt has had prior anesthetic. Type: General    No history of anesthetic complications          ROS/MED HX    ENT/Pulmonary:      (-) sleep apnea   Neurologic:      (-) TIA, Other neuro hx and Dementia   Cardiovascular: Comment: Narrative Performed At  This result has an attachment that is not available.      No previous study for comparison.    Sinus bradycardia heart rate in the 40s and 50s during the examination    Normal left ventricular size.    Left ventricle ejection fraction is normal. Ejection fraction estimated   60 to 65%.    Normal right ventricular size and systolic function.    Mild to moderate aortic stenosis. Mean gradient of 13 mmHg. Cath related   valve area 0.9 cm .    Mild tricuspid insufficiency. Estimate of RV systolic pressure 17 mmHg   plus right atrial pressure    Cannot exclude small patent foramen ovale         (+) ----. : . . . :. valvular problems/murmurs type: AS .      (-) hypertension, CAD, CHF, pacemaker and pacemaker  "  METS/Exercise Tolerance:     Hematologic:     (+) Anemia, -      Musculoskeletal:   (+) arthritis,  -       GI/Hepatic:     (+) GERD hepatitis       Renal/Genitourinary:         Endo:  - neg endo ROS       Psychiatric:        (-) psychiatric history   Infectious Disease:  - neg infectious disease ROS       Malignancy:      - no malignancy   Other:    - neg other ROS                      Physical Exam      Airway   Mallampati: II  TM distance: >3 FB  Neck ROM: full    Dental     Cardiovascular   Rhythm and rate: regular and normal  (+) murmur       Pulmonary    breath sounds clear to auscultation    Other findings: No lab results found.   No lab results found.        No results found for: WBC, HGB, HCT, PLT, CRP, SED, NA, POTASSIUM, CHLORIDE, CO2, BUN, CR, GLC, HUGO, PHOS, MAG, ALBUMIN, PROTTOTAL, ALT, AST, GGT, ALKPHOS, BILITOTAL, BILIDIRECT, LIPASE, AMYLASE, ADRIANNA, PTT, INR, FIBR, TSH, T4, T3, HCG, HCGS, CKTOTAL, CKMB, TROPN    Preop Vitals  BP Readings from Last 3 Encounters:   11/05/19 124/67   09/24/19 112/68    Pulse Readings from Last 3 Encounters:   09/24/19 86      Resp Readings from Last 3 Encounters:   11/05/19 16    SpO2 Readings from Last 3 Encounters:   11/05/19 100%   09/24/19 97%      Temp Readings from Last 1 Encounters:   11/05/19 98.3  F (36.8  C) (Temporal)    Ht Readings from Last 1 Encounters:   11/05/19 1.499 m (4' 11\")      Wt Readings from Last 1 Encounters:   11/05/19 70.3 kg (155 lb)    Estimated body mass index is 31.31 kg/m  as calculated from the following:    Height as of this encounter: 1.499 m (4' 11\").    Weight as of this encounter: 70.3 kg (155 lb).       Anesthesia Plan      History & Physical Review  History and physical reviewed and following examination; no interval change.    ASA Status:  3 .    NPO Status:  > 8 hours    Plan for MAC with Propofol induction. Maintenance will be Balanced.  Reason for MAC:  Deep or markedly invasive procedure (G8)  PONV prophylaxis:  " Ondansetron (or other 5HT-3)       Postoperative Care  Postoperative pain management:  IV analgesics and Oral pain medications.      Consents  Anesthetic plan, risks, benefits and alternatives discussed with:  Patient..                 Guru Trammell MD                    .

## 2019-11-06 LAB — COPATH REPORT: NORMAL

## 2019-11-14 ENCOUNTER — TELEPHONE (OUTPATIENT)
Dept: SURGERY | Facility: CLINIC | Age: 69
End: 2019-11-14

## 2019-11-14 NOTE — TELEPHONE ENCOUNTER
"  GENERAL SURGERY NURSE PHONE TRIAGE   Pamela Engle    MRN# [id number]  AGE:  69 year old  YOB: 1950  [phone] (home)   Surgeon: Dr. Vasquez  Surgical Assist:  NONE     Surgery type: Midline upper chest subcutaneous mass     Surgery Date: November / 05 / 2019     POD:9     CHIEF CONCERN:  Swelling at incision   Swelling described as \"Larger than the lump was before surgery\"  \"feels squishy\" not taunt.  No redness, but bruising reported.  No drainage.  Pain is a 2 on one to two scale.    Patient was offered an appointment this morning, on her way to dentist, unavailable for 1.5 hours.  Appointment was scheduled for (tentative) this afternoon with clinic [facility].  Will discuss with Dr. Vasquez, patient will call after dentist's appointment    Discussed with Dr. Vasquez.  Most probably a seroma.  So long as area is not red, taunt, painful, patient may try warm packs and allow seroma to resolve without drainage.  If she would still like to come in for assessment today, we will keep current afternoon appointment.  PLAN:   Left message for patient to call clinic.   Janice Hastings RN on 11/14/2019 at 10:14 AM    ADDENDUM:   Spoke with patient.  She would prefer to cancel appointment for this afternoon.  Will use warm packs, call with any increase in swelling, redness, pain or any questions or concerns.  Routine po appointment 11/18/19  Janice Hastings RN on 11/14/2019 at 10:52 AM      "

## 2019-11-14 NOTE — TELEPHONE ENCOUNTER
Name of caller: Patient    Reason for Call:  Pt feels her incision may be infected. Area is puffy and squishy no fever no drainage.     Surgeon:  Pedro     Recent Surgery:  Yes.    If yes, when & what type:   Midline upper chest subcutaneous mass 11/5/19       Best phone number to reach pt at is: [phone] (M)  Ok to leave a message with medical info? Yes.    Pharmacy preferred (if calling for a refill): N/A

## 2019-11-19 ENCOUNTER — OFFICE VISIT (OUTPATIENT)
Dept: SURGERY | Facility: CLINIC | Age: 69
End: 2019-11-19
Payer: COMMERCIAL

## 2019-11-19 VITALS
HEART RATE: 72 BPM | HEIGHT: 59 IN | WEIGHT: 155 LBS | SYSTOLIC BLOOD PRESSURE: 142 MMHG | BODY MASS INDEX: 31.25 KG/M2 | OXYGEN SATURATION: 95 % | DIASTOLIC BLOOD PRESSURE: 72 MMHG | RESPIRATION RATE: 16 BRPM

## 2019-11-19 DIAGNOSIS — Z09 SURGICAL FOLLOWUP VISIT: Primary | ICD-10-CM

## 2019-11-19 PROCEDURE — 99024 POSTOP FOLLOW-UP VISIT: CPT | Performed by: PHYSICIAN ASSISTANT

## 2019-11-19 ASSESSMENT — MIFFLIN-ST. JEOR: SCORE: 1138.71

## 2019-11-19 NOTE — PROGRESS NOTES
11/19/2019    [facility] Note     Subjective:  Pamela Engle is here for her first postoperative visit. She underwent excision of chest lipoma by Dr. Vasquez on 11/5/19. Today she  tells me she has been feeling well since surgery.  She notes a soft swelling at her surgical site, but this is non-tender and she denies any skin changes/drainage at the site.  Patient inquires whether she may donate platelets at this stage in recovery.    Objective:    Inc - Healing well, well approximated, without signs of infection and no drainage.  + soft fluctuance over surgical site, non-tender.      Assessment:  S/p excision of chest lipoma. The pathology confirms benign lipoma.  Surgical site seroma    Plan:  Anticipate seroma will resolve within 1-2 months.   OK to donate platelets.  RTC PRN      Maura Pepper PA-C      Please route or send letter to:  Primary Care Provider (PCP)

## 2019-11-26 ENCOUNTER — RECORDS - HEALTHEAST (OUTPATIENT)
Dept: ADMINISTRATIVE | Facility: OTHER | Age: 69
End: 2019-11-26

## 2020-01-13 ENCOUNTER — COMMUNICATION - HEALTHEAST (OUTPATIENT)
Dept: FAMILY MEDICINE | Facility: CLINIC | Age: 70
End: 2020-01-13

## 2020-01-13 DIAGNOSIS — E78.5 HYPERLIPIDEMIA: ICD-10-CM

## 2020-01-21 ENCOUNTER — COMMUNICATION - HEALTHEAST (OUTPATIENT)
Dept: SCHEDULING | Facility: CLINIC | Age: 70
End: 2020-01-21

## 2020-02-01 ENCOUNTER — RECORDS - HEALTHEAST (OUTPATIENT)
Dept: ADMINISTRATIVE | Facility: OTHER | Age: 70
End: 2020-02-01

## 2020-02-07 ENCOUNTER — RECORDS - HEALTHEAST (OUTPATIENT)
Dept: ADMINISTRATIVE | Facility: OTHER | Age: 70
End: 2020-02-07

## 2020-02-12 ENCOUNTER — RECORDS - HEALTHEAST (OUTPATIENT)
Dept: ADMINISTRATIVE | Facility: OTHER | Age: 70
End: 2020-02-12

## 2020-03-10 ENCOUNTER — RECORDS - HEALTHEAST (OUTPATIENT)
Dept: ADMINISTRATIVE | Facility: OTHER | Age: 70
End: 2020-03-10

## 2020-03-11 ENCOUNTER — HEALTH MAINTENANCE LETTER (OUTPATIENT)
Age: 70
End: 2020-03-11

## 2020-06-09 ENCOUNTER — RECORDS - HEALTHEAST (OUTPATIENT)
Dept: ADMINISTRATIVE | Facility: OTHER | Age: 70
End: 2020-06-09

## 2020-07-21 ENCOUNTER — RECORDS - HEALTHEAST (OUTPATIENT)
Dept: ADMINISTRATIVE | Facility: OTHER | Age: 70
End: 2020-07-21

## 2020-09-02 ENCOUNTER — COMMUNICATION - HEALTHEAST (OUTPATIENT)
Dept: FAMILY MEDICINE | Facility: CLINIC | Age: 70
End: 2020-09-02

## 2020-09-08 ENCOUNTER — AMBULATORY - HEALTHEAST (OUTPATIENT)
Dept: SURGERY | Facility: CLINIC | Age: 70
End: 2020-09-08

## 2020-09-08 DIAGNOSIS — Z11.59 ENCOUNTER FOR SCREENING FOR OTHER VIRAL DISEASES: ICD-10-CM

## 2020-09-17 ENCOUNTER — OFFICE VISIT - HEALTHEAST (OUTPATIENT)
Dept: FAMILY MEDICINE | Facility: CLINIC | Age: 70
End: 2020-09-17

## 2020-09-17 DIAGNOSIS — Z01.818 PREOP GENERAL PHYSICAL EXAM: ICD-10-CM

## 2020-09-17 LAB
ANION GAP SERPL CALCULATED.3IONS-SCNC: 8 MMOL/L (ref 5–18)
ATRIAL RATE - MUSE: 52 BPM
BASOPHILS # BLD AUTO: 0 THOU/UL (ref 0–0.2)
BASOPHILS NFR BLD AUTO: 0 % (ref 0–2)
BUN SERPL-MCNC: 18 MG/DL (ref 8–22)
CALCIUM SERPL-MCNC: 9.7 MG/DL (ref 8.5–10.5)
CHLORIDE BLD-SCNC: 107 MMOL/L (ref 98–107)
CO2 SERPL-SCNC: 26 MMOL/L (ref 22–31)
CREAT SERPL-MCNC: 0.67 MG/DL (ref 0.6–1.1)
DIASTOLIC BLOOD PRESSURE - MUSE: NORMAL
EOSINOPHIL # BLD AUTO: 0.3 THOU/UL (ref 0–0.4)
EOSINOPHIL NFR BLD AUTO: 6 % (ref 0–6)
ERYTHROCYTE [DISTWIDTH] IN BLOOD BY AUTOMATED COUNT: 12.2 % (ref 11–14.5)
GFR SERPL CREATININE-BSD FRML MDRD: >60 ML/MIN/1.73M2
GLUCOSE BLD-MCNC: 92 MG/DL (ref 70–125)
HCT VFR BLD AUTO: 40.3 % (ref 35–47)
HGB BLD-MCNC: 13.4 G/DL (ref 12–16)
INTERPRETATION ECG - MUSE: NORMAL
LYMPHOCYTES # BLD AUTO: 1.5 THOU/UL (ref 0.8–4.4)
LYMPHOCYTES NFR BLD AUTO: 31 % (ref 20–40)
MCH RBC QN AUTO: 29.7 PG (ref 27–34)
MCHC RBC AUTO-ENTMCNC: 33.2 G/DL (ref 32–36)
MCV RBC AUTO: 89 FL (ref 80–100)
MONOCYTES # BLD AUTO: 0.4 THOU/UL (ref 0–0.9)
MONOCYTES NFR BLD AUTO: 8 % (ref 2–10)
NEUTROPHILS # BLD AUTO: 2.6 THOU/UL (ref 2–7.7)
NEUTROPHILS NFR BLD AUTO: 55 % (ref 50–70)
P AXIS - MUSE: 54 DEGREES
PLATELET # BLD AUTO: 281 THOU/UL (ref 140–440)
PMV BLD AUTO: 7.4 FL (ref 7–10)
POTASSIUM BLD-SCNC: 4.4 MMOL/L (ref 3.5–5)
PR INTERVAL - MUSE: 152 MS
QRS DURATION - MUSE: 86 MS
QT - MUSE: 436 MS
QTC - MUSE: 405 MS
R AXIS - MUSE: 23 DEGREES
RBC # BLD AUTO: 4.51 MILL/UL (ref 3.8–5.4)
SODIUM SERPL-SCNC: 141 MMOL/L (ref 136–145)
SYSTOLIC BLOOD PRESSURE - MUSE: NORMAL
T AXIS - MUSE: 59 DEGREES
VENTRICULAR RATE- MUSE: 52 BPM
WBC: 4.7 THOU/UL (ref 4–11)

## 2020-09-17 ASSESSMENT — MIFFLIN-ST. JEOR: SCORE: 1126.62

## 2020-09-30 ASSESSMENT — MIFFLIN-ST. JEOR: SCORE: 1124.63

## 2020-10-01 ENCOUNTER — AMBULATORY - HEALTHEAST (OUTPATIENT)
Dept: LAB | Facility: CLINIC | Age: 70
End: 2020-10-01

## 2020-10-01 DIAGNOSIS — Z11.59 ENCOUNTER FOR SCREENING FOR OTHER VIRAL DISEASES: ICD-10-CM

## 2020-10-03 ENCOUNTER — COMMUNICATION - HEALTHEAST (OUTPATIENT)
Dept: SCHEDULING | Facility: CLINIC | Age: 70
End: 2020-10-03

## 2020-10-04 ENCOUNTER — ANESTHESIA - HEALTHEAST (OUTPATIENT)
Dept: SURGERY | Facility: CLINIC | Age: 70
End: 2020-10-04

## 2020-10-05 ENCOUNTER — DOCUMENTATION ONLY (OUTPATIENT)
Dept: OTHER | Facility: CLINIC | Age: 70
End: 2020-10-05

## 2020-10-05 ENCOUNTER — SURGERY - HEALTHEAST (OUTPATIENT)
Dept: SURGERY | Facility: CLINIC | Age: 70
End: 2020-10-05

## 2020-10-05 ENCOUNTER — AMBULATORY - HEALTHEAST (OUTPATIENT)
Dept: OTHER | Facility: CLINIC | Age: 70
End: 2020-10-05

## 2020-10-05 ASSESSMENT — MIFFLIN-ST. JEOR: SCORE: 1124.06

## 2020-10-07 ASSESSMENT — MIFFLIN-ST. JEOR: SCORE: 1124.06

## 2020-10-09 ENCOUNTER — COMMUNICATION - HEALTHEAST (OUTPATIENT)
Dept: SCHEDULING | Facility: CLINIC | Age: 70
End: 2020-10-09

## 2020-10-20 ENCOUNTER — RECORDS - HEALTHEAST (OUTPATIENT)
Dept: ADMINISTRATIVE | Facility: OTHER | Age: 70
End: 2020-10-20

## 2020-11-04 ENCOUNTER — RECORDS - HEALTHEAST (OUTPATIENT)
Dept: ADMINISTRATIVE | Facility: OTHER | Age: 70
End: 2020-11-04

## 2020-11-05 ENCOUNTER — COMMUNICATION - HEALTHEAST (OUTPATIENT)
Dept: FAMILY MEDICINE | Facility: CLINIC | Age: 70
End: 2020-11-05

## 2020-11-05 DIAGNOSIS — Z23 NEED FOR SHINGLES VACCINE: ICD-10-CM

## 2020-11-10 ENCOUNTER — RECORDS - HEALTHEAST (OUTPATIENT)
Dept: ADMINISTRATIVE | Facility: OTHER | Age: 70
End: 2020-11-10

## 2021-01-03 ENCOUNTER — HEALTH MAINTENANCE LETTER (OUTPATIENT)
Age: 71
End: 2021-01-03

## 2021-01-08 ENCOUNTER — RECORDS - HEALTHEAST (OUTPATIENT)
Dept: ADMINISTRATIVE | Facility: OTHER | Age: 71
End: 2021-01-08

## 2021-01-13 ENCOUNTER — OFFICE VISIT - HEALTHEAST (OUTPATIENT)
Dept: FAMILY MEDICINE | Facility: CLINIC | Age: 71
End: 2021-01-13

## 2021-01-13 DIAGNOSIS — E78.00 PURE HYPERCHOLESTEROLEMIA: ICD-10-CM

## 2021-01-13 DIAGNOSIS — Z13.1 SCREENING FOR DIABETES MELLITUS: ICD-10-CM

## 2021-01-13 DIAGNOSIS — I35.0 NONRHEUMATIC AORTIC VALVE STENOSIS: ICD-10-CM

## 2021-01-13 DIAGNOSIS — Z78.0 POST-MENOPAUSAL: ICD-10-CM

## 2021-01-13 DIAGNOSIS — Z98.1 S/P LUMBAR FUSION: ICD-10-CM

## 2021-01-13 DIAGNOSIS — Z00.00 ROUTINE GENERAL MEDICAL EXAMINATION AT A HEALTH CARE FACILITY: ICD-10-CM

## 2021-01-13 DIAGNOSIS — Z86.0100 PERSONAL HISTORY OF COLONIC POLYPS: ICD-10-CM

## 2021-01-13 DIAGNOSIS — R79.9 ABNORMAL FINDING OF BLOOD CHEMISTRY, UNSPECIFIED: ICD-10-CM

## 2021-01-13 LAB
ALBUMIN SERPL-MCNC: 4.2 G/DL (ref 3.5–5)
ALP SERPL-CCNC: 98 U/L (ref 45–120)
ALT SERPL W P-5'-P-CCNC: 25 U/L (ref 0–45)
ANION GAP SERPL CALCULATED.3IONS-SCNC: 12 MMOL/L (ref 5–18)
AST SERPL W P-5'-P-CCNC: 21 U/L (ref 0–40)
BILIRUB SERPL-MCNC: 0.3 MG/DL (ref 0–1)
BUN SERPL-MCNC: 23 MG/DL (ref 8–22)
CALCIUM SERPL-MCNC: 9.7 MG/DL (ref 8.5–10.5)
CHLORIDE BLD-SCNC: 103 MMOL/L (ref 98–107)
CO2 SERPL-SCNC: 26 MMOL/L (ref 22–31)
CREAT SERPL-MCNC: 0.73 MG/DL (ref 0.6–1.1)
GFR SERPL CREATININE-BSD FRML MDRD: >60 ML/MIN/1.73M2
GLUCOSE BLD-MCNC: 93 MG/DL (ref 70–125)
HBA1C MFR BLD: 5.8 %
POTASSIUM BLD-SCNC: 4.5 MMOL/L (ref 3.5–5)
PROT SERPL-MCNC: 7.3 G/DL (ref 6–8)
SODIUM SERPL-SCNC: 141 MMOL/L (ref 136–145)

## 2021-01-13 ASSESSMENT — MIFFLIN-ST. JEOR: SCORE: 1130.04

## 2021-01-14 LAB
25(OH)D3 SERPL-MCNC: 34.2 NG/ML (ref 30–80)
25(OH)D3 SERPL-MCNC: 34.2 NG/ML (ref 30–80)

## 2021-01-18 ENCOUNTER — COMMUNICATION - HEALTHEAST (OUTPATIENT)
Dept: FAMILY MEDICINE | Facility: CLINIC | Age: 71
End: 2021-01-18

## 2021-01-19 ENCOUNTER — RECORDS - HEALTHEAST (OUTPATIENT)
Dept: ADMINISTRATIVE | Facility: OTHER | Age: 71
End: 2021-01-19

## 2021-01-22 ENCOUNTER — RECORDS - HEALTHEAST (OUTPATIENT)
Dept: ADMINISTRATIVE | Facility: OTHER | Age: 71
End: 2021-01-22

## 2021-01-22 ENCOUNTER — ANCILLARY PROCEDURE (OUTPATIENT)
Dept: MAMMOGRAPHY | Facility: CLINIC | Age: 71
End: 2021-01-22
Attending: NURSE PRACTITIONER
Payer: COMMERCIAL

## 2021-01-22 DIAGNOSIS — Z12.31 VISIT FOR SCREENING MAMMOGRAM: ICD-10-CM

## 2021-01-22 PROCEDURE — 77067 SCR MAMMO BI INCL CAD: CPT | Mod: TC | Performed by: RADIOLOGY

## 2021-02-01 ENCOUNTER — COMMUNICATION - HEALTHEAST (OUTPATIENT)
Dept: FAMILY MEDICINE | Facility: CLINIC | Age: 71
End: 2021-02-01

## 2021-02-11 ENCOUNTER — RECORDS - HEALTHEAST (OUTPATIENT)
Dept: BONE DENSITY | Facility: CLINIC | Age: 71
End: 2021-02-11

## 2021-02-11 ENCOUNTER — RECORDS - HEALTHEAST (OUTPATIENT)
Dept: ADMINISTRATIVE | Facility: OTHER | Age: 71
End: 2021-02-11

## 2021-02-11 DIAGNOSIS — Z78.0 ASYMPTOMATIC MENOPAUSAL STATE: ICD-10-CM

## 2021-02-22 ENCOUNTER — OFFICE VISIT - HEALTHEAST (OUTPATIENT)
Dept: FAMILY MEDICINE | Facility: CLINIC | Age: 71
End: 2021-02-22

## 2021-02-22 DIAGNOSIS — Q78.2 OSTEOPETROSIS: ICD-10-CM

## 2021-03-10 ENCOUNTER — RECORDS - HEALTHEAST (OUTPATIENT)
Dept: ADMINISTRATIVE | Facility: OTHER | Age: 71
End: 2021-03-10

## 2021-03-17 ENCOUNTER — COMMUNICATION - HEALTHEAST (OUTPATIENT)
Dept: ADMINISTRATIVE | Facility: CLINIC | Age: 71
End: 2021-03-17

## 2021-03-17 DIAGNOSIS — E78.5 HYPERLIPIDEMIA: ICD-10-CM

## 2021-04-25 ENCOUNTER — HEALTH MAINTENANCE LETTER (OUTPATIENT)
Age: 71
End: 2021-04-25

## 2021-05-27 NOTE — TELEPHONE ENCOUNTER
Left message to call back for: Patient  Information to relay to patient:  Left detailed message for pt letting her know that the order was placed for the mammogram and she can call [phone] to schedule this at any time.

## 2021-05-28 NOTE — TELEPHONE ENCOUNTER
FYI - Status Update  Who is Calling: Patient  Update: Questioning if a 90 day prescription can be submitted to [facility]. Patient states she receives one month free if the pharmacy receives a prescription for 90 days.   Okay to leave a detailed message?:  No return call needed

## 2021-05-28 NOTE — TELEPHONE ENCOUNTER
RN cannot approve Refill Request    RN can NOT refill this medication PCP messaged that patient is overdue for Labs.       Judith Rosado, [facility] Triage/Med Refill 4/30/2019    Requested Prescriptions   Pending Prescriptions Disp Refills     valACYclovir (VALTREX) 1000 MG tablet 10 tablet 3     Sig: Take 1 tablet (1,000 mg total) by mouth 2 (two) times a day.       Antivirals Refill Protocol Failed - 4/29/2019 11:26 AM        Failed - Renal function done in last year     No results found for: CREATININE, CREATININE          Passed - Visit with PCP or prescribing provider visit in past 12 months or next 3 months     Last office visit with prescriber/PCP: 9/20/2018 Geovanna Luna CNP OR same dept: 9/20/2018 Geovanna Luna CNP OR same specialty: 9/20/2018 Geovanna Luna CNP  Last physical: 12/18/2018 Last MTM visit: Visit date not found   Next visit within 3 mo: Visit date not found  Next physical within 3 mo: Visit date not found  Prescriber OR PCP: Geovanna Luna CNP  Last diagnosis associated with med order: 1. Oral herpes  - valACYclovir (VALTREX) 1000 MG tablet; Take 1 tablet (1,000 mg total) by mouth 2 (two) times a day.  Dispense: 10 tablet; Refill: 3    If protocol passes may refill for 12 months if within 3 months of last provider visit (or a total of 15 months).

## 2021-06-01 NOTE — PROGRESS NOTES
Assessment and Plan:   1. Routine general medical examination at a health care facility  Future orders for fasting labs.  Influenza and pneumonia vaccines today.  She will check insurance on coverage for Shingrix.  - Influenza High Dose, Seasonal 65+ yrs    2. Heart murmur, systolic  New murmur identified on exam.  Not noted 9 months ago at preop.  Echocardiogram to assess.  - Echo Complete; Future    3. Mixed hyperlipidemia  - Lipid Cascade; Future    4. Weight gain  Counseled on exercise and particularly resistance training to increase basal metabolic rate.  Discussed dietary modifications and possible benefit of tracking and accountability with a structured program.  - Thyroid Bureau; Future    5.  Lipoma of skin and subcutaneous tissue  Mass consistent with lipoma.  Measures 3.5 x 4.5 cm today.  She will keep track of this and let me know if there is any rapid growth, otherwise plan to recheck in 1 year.    6. Immunization counseling  - Hepatitis A Immune Status; Future    7. Screening for diabetes mellitus  - Comprehensive Metabolic Panel; Future    8. Encounter for vitamin deficiency screening  - Vitamin D, Total (25-Hydroxy); Future    9. Sacralization of lumbar vertebra  She brings in a copy of her record that noted previous sacralization of the lumbar vertebrae at L5.  She states this is not causing her any chronic discomfort though she does take longer to warm up when she is exercising.  Simply wants this noted in her chart.    10. Body mass index (BMI) of 31.0-31.9 in adult   - Vitamin D, Total (25-Hydroxy); Future    11. Encounter for immunization   - Influenza High Dose, Seasonal 65+ yrs    The patient's current medical problems were reviewed.    The following high BMI interventions were performed this visit: encouragement to exercise and dietary needs education  The following health maintenance schedule was reviewed with the patient and provided in printed form in the after visit summary:   Health  Maintenance   Topic Date Due     HEPATITIS C SCREENING  1950     ZOSTER VACCINES (2 of 3) 08/21/2012     MEDICARE ANNUAL WELLNESS VISIT  07/31/2016     DXA SCAN  07/31/2016     PNEUMOCOCCAL POLYSACCHARIDE VACCINE AGE 65 AND OVER  07/31/2016     INFLUENZA VACCINE RULE BASED (1) 08/01/2019     FALL RISK ASSESSMENT  09/10/2020     MAMMOGRAM  05/21/2021     TD 18+ HE  07/27/2022     COLONOSCOPY  05/16/2023     ADVANCE CARE PLANNING  09/10/2024     PNEUMOCOCCAL CONJUGATE VACCINE FOR ADULTS (PCV13 OR PREVNAR)  Completed        Subjective:   Chief Complaint: Pamela Engle is an 69 y.o. female here for an Annual Wellness visit.   HPI:   She is an overall healthy woman.  History of hyperlipidemia, Fuchs' endothelial dystrophy, osteoarthritis.  She has been well in the past year.  Her rotator cuff surgery went well and she has good function and no pain.  She recently purchased a house in [place] that she has been fixing up herself.  Watching YouTube videos and teaching herself to replace facets and toilets.  She is keeping herself busy, recently moved from [place] after  passed away.    Weight: Gradual gain of 5 pounds over the last few years.  She recently joined a gym in [place] and has started some workout classes.  Resistance training and cardio.  She is active throughout the day with her renovations.  She admits that diet is poor as she is quite busy and often snacks or eats frozen food.    Mass: Center of chest.  Noted approximately 6 months ago.  Has not noted any change in size since that time.  Not painful.    Immunizations: Wondering about hep A.  Believes she had the hepatitis A virus as a child.    Health maintenance: Recently had mammogram done.  Up-to-date on colonoscopy.  Last DEXA scan in 2015, she reports this was normal.  Due for pneumonia vaccine and influenza.  Interested in Shingrix.    Review of Systems: Please see above.  The rest of the review of systems are negative for all  systems.    Patient Care Team:  Geovanna Luna CNP as PCP - General (Nurse Practitioner)  Geovanna Luna CNP as Assigned PCP  Lonny Yadav MD as Physician ([facility])  Patricia Yang MD as Physician (Dermatology)  Jose Mcneal DO as Physician (Orthopedic Surgery)  Travis Griffiths DO as Physician (Orthopedic Surgery)  Ganga Cortez MD as Physician (Ophthalmology)     Patient Active Problem List   Diagnosis     Hyperlipidemia     Osteoarthritis     Fuchs' endothelial dystrophy     Oral herpes     No past medical history on file.   No past surgical history on file.   Family History   Problem Relation Age of Onset     CABG Mother      Arthritis Mother      Kidney disease Mother      Colon cancer Father      Hypertension Father      Hyperlipidemia Father      Prostate cancer Father      Dementia Father      CABG Sister      Diabetes Sister      Liver cancer Maternal Grandmother      Lung cancer Paternal Grandfather      Drug abuse Brother      Breast cancer Paternal Grandmother       Social History     Socioeconomic History     Marital status:      Spouse name: Not on file     Number of children: Not on file     Years of education: Not on file     Highest education level: Not on file   Occupational History     Not on file   Social Needs     Financial resource strain: Not on file     Food insecurity:     Worry: Not on file     Inability: Not on file     Transportation needs:     Medical: Not on file     Non-medical: Not on file   Tobacco Use     Smoking status: Former Smoker     Last attempt to quit: 2010     Years since quittin.6     Smokeless tobacco: Never Used   Substance and Sexual Activity     Alcohol use: Yes     Alcohol/week: 1.2 oz     Types: 2 Shots of liquor per week     Drug use: No     Sexual activity: Not on file   Lifestyle     Physical activity:     Days per week: Not on file     Minutes per session: Not on file     Stress: Not on file  "  Relationships     Social connections:     Talks on phone: Not on file     Gets together: Not on file     Attends Latter-day service: Not on file     Active member of club or organization: Not on file     Attends meetings of clubs or organizations: Not on file     Relationship status: Not on file     Intimate partner violence:     Fear of current or ex partner: Not on file     Emotionally abused: Not on file     Physically abused: Not on file     Forced sexual activity: Not on file   Other Topics Concern     Not on file   Social History Narrative     Not on file      Current Outpatient Medications   Medication Sig Dispense Refill     acetaminophen (TYLENOL) 500 MG tablet Take 500 mg by mouth every 6 (six) hours as needed for pain.       cholecalciferol, vitamin D3, (VITAMIN D3) 400 unit Chew Chew.       folic acid 20 mg cap Take by mouth.       multivitamin therapeutic tablet Take 1 tablet by mouth daily.       rosuvastatin (CRESTOR) 10 MG tablet Take 1 tablet (10 mg total) by mouth every other day. 90 tablet 3     valACYclovir (VALTREX) 1000 MG tablet Take 1 tablet (1,000 mg total) by mouth 2 (two) times a day. 90 tablet 0     No current facility-administered medications for this visit.       Objective:   Vital Signs:   Visit Vitals  /72 (Patient Site: Left Arm, Patient Position: Sitting, Cuff Size: Adult Regular)   Pulse 64   Ht 4' 11\" (1.499 m)   Wt 154 lb 12 oz (70.2 kg)   BMI 31.26 kg/m         VisionScreening:  No exam data present     PHYSICAL EXAM  GENERAL: Alert, female   PSYCH: Pleasant mood, affect appropriate.  Good judgment and insight.  Intact recent and remote memory.  Good eye contact.  SKIN: Anterior chest over her second third and fourth ribs with a 3.5 x 4.5 cm soft fatty mass.  Subcutaneous layer.  HEAD: Normocephalic, atraumatic  EYES: Conjunctiva pink, sclera white, no exudates. CHUCK.  EOMs intact. Corneal light reflex bilaterally, red reflex present.Undilated fundoscopic exam normal, " including normal size, appearance cup-to-disc ratio.  Normal posterior segments, including no exudates or hemorrhages.  EARS: TMs pearly grey, no bulging, redness, retraction. Hearing grossly normal.   NOSE: Nares patent, no discharge.  Normal nasal mucosa, septum and turbinates.  MOUTH: Pharynx moist, pink without exudate. No tonsillar enlargement  NECK: No lymphadenopathy. Thyroid borders smooth without enlargement, nodules.   CV: Regular rate and rhythm.  Grade 3 holosystolic murmur heard best at the left upper sternal border.  No radiation.  RESP: Lung sounds clear  ABDOMEN: BS+. Abdomen soft, nontender to palpation without guarding. No organomegaly, masses or hernias  PV :  No edema  BREASTS: Breasts symmetric, no dimpling, masses or skin discolorations seen. Areolas and nipples symmetric without discharge. On palpation, breast tissue supple and nontender. No masses or nodules. Axillary and epitrochlear lymph nodes nonpalpable.       Assessment Results 9/10/2019   Activities of Daily Living No help needed   Instrumental Activities of Daily Living No help needed   Get Up and Go Score Less than 12 seconds   Mini Cog Total Score 5   Some recent data might be hidden     A Mini-Cog score of 0-2 suggests the possibility of dementia, score of 3-5 suggests no dementia    Identified Health Risks:     She is at risk for lack of exercise and has been provided with information to increase physical activity for the benefit of her well-being.  The patient was counseled and encouraged to consider modifying their diet and eating habits. She was provided with information on recommended healthy diet options.  The patient was provided with written information regarding signs of hearing loss.  She is at risk for falling and has been provided with information to reduce the risk of falling at home.  Patient's advanced directive was discussed and I am comfortable with the patient's wishes.

## 2021-06-02 VITALS — BODY MASS INDEX: 30.14 KG/M2 | HEIGHT: 59 IN | WEIGHT: 149.5 LBS

## 2021-06-02 VITALS — BODY MASS INDEX: 31.25 KG/M2 | WEIGHT: 155 LBS | HEIGHT: 59 IN

## 2021-06-02 NOTE — PATIENT INSTRUCTIONS - HE
"1. Regular, moderate, exercise regimen is a good way to have a \"early warning\" system to look for worsening of your aortic valve narrowing.  2. We will plan to repeat an echocardiogram in 2 years time.  I would like to see you in follow-up at that time  3. Call sooner if chest pain, lightheadedness, or passing out occurs.  "

## 2021-06-02 NOTE — PATIENT INSTRUCTIONS - HE
Follow your surgeon's direction on when to stop eating and drinking prior to surgery.  Your surgeon will be managing your pain after your surgery.

## 2021-06-02 NOTE — PROGRESS NOTES
Preoperative Exam    Scheduled Procedure: Lipoma removal  Surgery Date:  11/5/19  Surgery Location: [facility]   Surgeon:  Dr. Robertson    Assessment/Plan:     1. Encounter for preoperative examination for general surgical procedure  - Electrocardiogram Perform - Clinic  - Hemoglobin    2. Lipoma of skin and subcutaneous tissue    3. Nonrheumatic aortic valve stenosis  Mild and asymptomatic. Plan for repeat echocardiogram in two years.      Surgical Procedure Risk: Low (reported cardiac risk generally < 1%)  Have you had prior anesthesia?: Yes  Have you or any family members had a previous anesthesia reaction:  Yes: general anesthesia - tremors  Do you or any family members have a history of a clotting or bleeding disorder?: No  Cardiac Risk Assessment: no increased risk for major cardiac complications    APPROVAL GIVEN to proceed with proposed procedure, without further diagnostic evaluation    Please Note:  No additional special considerations    Functional Status: Independent  Patient plans to recover at home with family.     Subjective:      Pamela Engle is a 69 y.o. female who presents for a preoperative consultation.   She is well known to me.  PMH notable for aortic valve stenosis, hyperlipidemia, osteoarthritis, otherwise negative.      She has a lipoma of the chest wall that was identified earlier this year. Will be undergoing removal with conscious sedation.     Mild to moderate aortic stenosis and tricuspid insufficiency identified on echocardiogram after murmur noted at physical. Consulted with cardiology and will monitor with serial echocardiographic studies bi-annually.  Asymptomatic.     All other systems reviewed and are negative, other than those listed in the HPI.    Pertinent History  Do you have difficulty breathing or chest pain after walking up a flight of stairs: No  History of obstructive sleep apnea: No  Steroid use in the last 6 months: No  Frequent Aspirin/NSAID use: No  Prior  Blood Transfusion: No  Prior Blood Transfusion Reaction: No  If for some reason prior to, during or after the procedure, if it is medically indicated, would you be willing to have a blood transfusion?:  There is no transfusion refusal.    Current Outpatient Medications   Medication Sig Dispense Refill     acetaminophen (TYLENOL) 500 MG tablet Take 500 mg by mouth every 6 (six) hours as needed for pain.       cholecalciferol, vitamin D3, (VITAMIN D3) 400 unit Chew Chew.       multivitamin therapeutic tablet Take 1 tablet by mouth daily.       rosuvastatin (CRESTOR) 10 MG tablet Take 1 tablet (10 mg total) by mouth every other day. 90 tablet 3     valACYclovir (VALTREX) 1000 MG tablet Take 1 tablet (1,000 mg total) by mouth 2 (two) times a day. 90 tablet 0     No current facility-administered medications for this visit.         Allergies   Allergen Reactions     Hydrocodone-Acetaminophen Swelling       Patient Active Problem List   Diagnosis     Hyperlipidemia     Osteoarthritis     Fuchs' endothelial dystrophy     Oral herpes     Sacralization of lumbar vertebra     Nonrheumatic aortic valve stenosis       No past medical history on file.    No past surgical history on file.    Social History     Socioeconomic History     Marital status:      Spouse name: Not on file     Number of children: Not on file     Years of education: Not on file     Highest education level: Not on file   Occupational History     Not on file   Social Needs     Financial resource strain: Not on file     Food insecurity:     Worry: Not on file     Inability: Not on file     Transportation needs:     Medical: Not on file     Non-medical: Not on file   Tobacco Use     Smoking status: Former Smoker     Packs/day: 1.00     Types: Cigarettes     Start date: 1969     Last attempt to quit: 2010     Years since quittin.7     Smokeless tobacco: Never Used   Substance and Sexual Activity     Alcohol use: Yes     Alcohol/week: 2.0  "standard drinks     Types: 2 Shots of liquor per week     Drug use: No     Sexual activity: Not on file   Lifestyle     Physical activity:     Days per week: Not on file     Minutes per session: Not on file     Stress: Not on file   Relationships     Social connections:     Talks on phone: Not on file     Gets together: Not on file     Attends Buddhist service: Not on file     Active member of club or organization: Not on file     Attends meetings of clubs or organizations: Not on file     Relationship status: Not on file     Intimate partner violence:     Fear of current or ex partner: Not on file     Emotionally abused: Not on file     Physically abused: Not on file     Forced sexual activity: Not on file   Other Topics Concern     Not on file   Social History Narrative     Not on file       Patient Care Team:  Geovanna Luna CNP as PCP - General (Nurse Practitioner)  Geovanna Luna CNP as Assigned PCP  Lonny Yadav MD as Physician ([facility])  Patricia Yang MD as Physician (Dermatology)  Jose Mcneal DO as Physician (Orthopedic Surgery)  Travis Griffiths DO as Physician (Orthopedic Surgery)  Ganga Cortez MD as Physician (Ophthalmology)          Objective:     Vitals:    10/31/19 1109   BP: 122/74   Pulse: 69   SpO2: 97%   Weight: 158 lb 8 oz (71.9 kg)   Height: 4' 11\" (1.499 m)         Physical Exam:  GENERAL: Alert, well appearing  PSYCH: Pleasant mood, affect appropriate.   SKIN: No atypical lesions  EYES: Conjunctiva pink, sclera white, no exudates. CHUCK.  EOMs intact. Corneal light reflex bilaterally, red reflex present.Undilated fundoscopic exam normal  EARS: TMs pearly grey, no bulging, redness, retraction.  MOUTH: Pharynx moist, pink without exudate. No tonsillar enlargement  NECK: No lymphadenopathy. Thyroid borders smooth without enlargement, nodules.   CV: Regular rate and rhythm without murmurs, rubs or gallops.  RESP: Lung sounds clear  ABDOMEN: BS+. " Abdomen soft, nontender to palpation without guarding. No organomegaly  PV : No edema  NEURO: Alert, oriented     Patient Instructions     Follow your surgeon's direction on when to stop eating and drinking prior to surgery.  Your surgeon will be managing your pain after your surgery.        EKG:    Most Recent EKG     Units 10/31/19  1129   VENTRATE BPM 59   ATRIALRATE BPM 59   QRSDURATION ms 84   QTINTERVAL ms 430   QTCCALC ms 425   P Axis degrees 46   RAXIS degrees 15   TAXIS degrees 56   MUSEDX  Sinus bradycardia  Otherwise normal ECG  When compared with ECG of 18-DEC-2018 14:32,  No significant change was found           Labs:  Recent Results (from the past 24 hour(s))   Electrocardiogram Perform - Clinic    Collection Time: 10/31/19 11:29 AM   Result Value Ref Range    SYSTOLIC BLOOD PRESSURE      DIASTOLIC BLOOD PRESSURE      VENTRICULAR RATE 59 BPM    ATRIAL RATE 59 BPM    P-R INTERVAL 148 ms    QRS DURATION 84 ms    Q-T INTERVAL 430 ms    QTC CALCULATION (BEZET) 425 ms    P Axis 46 degrees    R AXIS 15 degrees    T AXIS 56 degrees    MUSE DIAGNOSIS       Sinus bradycardia  Otherwise normal ECG  When compared with ECG of 18-DEC-2018 14:32,  No significant change was found     Hemoglobin    Collection Time: 10/31/19 11:35 AM   Result Value Ref Range    Hemoglobin 13.4 12.0 - 16.0 g/dL       Immunization History   Administered Date(s) Administered     Influenza high dose,seasonal,PF, 65+ yrs 09/20/2018, 09/10/2019     Pneumo Conj 13-V (2010&after) 10/19/2016     Pneumo Polysac 23-V 09/10/2019     Tdap 07/27/2012     ZOSTER, LIVE 06/26/2012           Electronically signed by Geovanna Luna CNP 10/31/19 11:05 AM

## 2021-06-02 NOTE — PROGRESS NOTES
CARDIOLOGY CLINIC CONSULT NOTE     Assessment/Plan:   1.  Aortic stenosis.  Mild to moderate.  Not provoking any symptoms at this time.  We discussed the natural history of aortic stenosis and that eventually she will likely require intervention.  We will plan to monitor with serial echocardiographic studies with the next being in 2 years.  Would like to see her in follow-up at that time.  2.  Sedentary lifestyle.  A regular, moderate exercise regimen was encouraged.    Follow-up in 2 years after echocardiogram     History of Present Illness:     It is my pleasure to see Pamela Engle at the [facility] clinic for evaluation of heart murmur.    Pamela Engle is a 69 y.o. female with a past medical history of hyperlipidemia, but no history of hypertension or diabetes mellitus or symptomatic coronary disease.  She moved here from [place] about a year ago and has gained more than 10 pounds in that time.  She has not been exercising regularly but is recently started a low impact aerobics class which she tolerates well.  She has had no chest pain, syncope, near syncopal spells, or palpitations.  Her stamina is stable.    Recently on routine physical examination she was noted to have a heart murmur.  Subsequent echocardiogram revealed mild to moderate aortic stenosis and she is referred for further evaluation.    Past Medical History:     Patient Active Problem List   Diagnosis     Hyperlipidemia     Osteoarthritis     Fuchs' endothelial dystrophy     Oral herpes     Sacralization of lumbar vertebra     Nonrheumatic aortic valve stenosis       Past Surgical History:   History reviewed. No pertinent surgical history.    Family History:     Family History   Problem Relation Age of Onset     CABG Mother 77     Arthritis Mother      Kidney disease Mother      Colon cancer Father      Hypertension Father      Hyperlipidemia Father      Prostate cancer Father      Dementia Father      CABG Sister 58     Diabetes  Sister      Liver cancer Maternal Grandmother      Lung cancer Paternal Grandfather      Drug abuse Brother      Breast cancer Paternal Grandmother      Coronary Stenting Sister 63        2 stents     Family history reviewed and is not pertinent to the chief complaint or presenting problem    Social History:    reports that she quit smoking about 9 years ago. Her smoking use included cigarettes. She started smoking about 50 years ago. She smoked 1.00 pack per day. She has never used smokeless tobacco. She reports current alcohol use of about 2.0 standard drinks of alcohol per week. She reports that she does not use drugs.    Exercise: Aerobics class, walking    Sleep:   Restorative    Meds:     Current Outpatient Medications   Medication Sig Dispense Refill     acetaminophen (TYLENOL) 500 MG tablet Take 500 mg by mouth every 6 (six) hours as needed for pain.       cholecalciferol, vitamin D3, (VITAMIN D3) 400 unit Chew Chew.       multivitamin therapeutic tablet Take 1 tablet by mouth daily.       rosuvastatin (CRESTOR) 10 MG tablet Take 1 tablet (10 mg total) by mouth every other day. 90 tablet 3     valACYclovir (VALTREX) 1000 MG tablet Take 1 tablet (1,000 mg total) by mouth 2 (two) times a day. 90 tablet 0     folic acid 20 mg cap Take by mouth.       No current facility-administered medications for this visit.        Allergies:   Hydrocodone-acetaminophen    Review of Systems:     General: Weight Gain  Eyes: WNL  Ears/Nose/Throat: WNL  Lungs: WNL  Heart: WNL  Stomach: Diarrhea  Bladder: WNL  Muscle/Joints: WNL  Skin: WNL  Nervous System: WNL  Mental Health: WNL     Blood: WNL        Objective:      Physical Exam  155 lb (70.3 kg)     Body mass index is 31.31 kg/m .  /78 (Patient Site: Left Arm, Patient Position: Sitting, Cuff Size: Adult Regular)   Pulse 68   Resp 16   Wt 155 lb (70.3 kg)   BMI 31.31 kg/m        General Appearance : Awake, Alert, No acute distress  HEENT: No Scleral icterus; the  mucous membranes were pink and moist.  Conjunctivae not injected  Neck:  No cervical bruits, jugular venous distention, or thyromegaly .  Transmitted murmur audible bilaterally  Chest: The spine was straight  Lungs: Respirations unlabored; the lungs are clear to auscultation.  No wheezing  Cardiovascular:   Normal point of maximal impulse.  Auscultation reveals normal first and second heart sounds with 2/6 harsh systolic murmur upper right sternal border.  Radiates throughout the precordium..  Carotid, radial, and dorsalis pedal pulses are intact and symmetric.  Abdomen: No organomegaly, masses, bruits, or tenderness. Bowels sounds are present  Extremities: No edema.  Skin: No xanthelasma. Warm, Dry.  Musculoskeletal: No tenderness.  Neurologic: Alert and oriented ×3.      EKG (personally reviewed):  12/2018: normal sinus rhythm 58 bpm.  Normal ECG    Cardiac Imaging Studies:  Echocardiogram 9/17/2019:    No previous study for comparison.    Sinus bradycardia heart rate in the 40s and 50s during the examination    Normal left ventricular size.    Left ventricle ejection fraction is normal. Ejection fraction estimated 60 to 65%.    Normal right ventricular size and systolic function.    Mild to moderate aortic stenosis. Mean gradient of 13 mmHg. Cath related valve area 0.9 cm .    Mild tricuspid insufficiency. Estimate of RV systolic pressure 17 mmHg plus right atrial pressure    Cannot exclude small patent foramen ovale    Lab Review   Lab Results   Component Value Date     09/11/2019    K 5.0 09/11/2019     (H) 09/11/2019    CO2 27 09/11/2019    BUN 16 09/11/2019    CREATININE 0.71 09/11/2019    CALCIUM 9.9 09/11/2019     Lab Results   Component Value Date    WBC 8.3 12/18/2018    HGB 12.5 12/18/2018    HCT 37.9 12/18/2018    MCV 88 12/18/2018     12/18/2018     Lab Results   Component Value Date    CHOL 205 (H) 09/11/2019    TRIG 217 (H) 09/11/2019    HDL 63 09/11/2019    LDLCALC 99 09/11/2019      No results found for: TROPONINI  No results found for: BNP  Lab Results   Component Value Date    TSH 1.31 09/11/2019       Yeison Hernandez MD [facility]    His note created using Dragon voice recognition software. Sound alike errors may have escaped editing.

## 2021-06-03 VITALS
BODY MASS INDEX: 31.95 KG/M2 | HEIGHT: 59 IN | SYSTOLIC BLOOD PRESSURE: 122 MMHG | OXYGEN SATURATION: 97 % | HEART RATE: 69 BPM | WEIGHT: 158.5 LBS | DIASTOLIC BLOOD PRESSURE: 74 MMHG

## 2021-06-03 VITALS
SYSTOLIC BLOOD PRESSURE: 144 MMHG | HEIGHT: 59 IN | WEIGHT: 154.75 LBS | BODY MASS INDEX: 31.2 KG/M2 | HEART RATE: 64 BPM | DIASTOLIC BLOOD PRESSURE: 72 MMHG

## 2021-06-03 VITALS
WEIGHT: 155 LBS | SYSTOLIC BLOOD PRESSURE: 130 MMHG | BODY MASS INDEX: 31.31 KG/M2 | HEART RATE: 68 BPM | RESPIRATION RATE: 16 BRPM | DIASTOLIC BLOOD PRESSURE: 78 MMHG

## 2021-06-03 VITALS — HEIGHT: 59 IN | WEIGHT: 154.75 LBS | BODY MASS INDEX: 31.2 KG/M2

## 2021-06-04 VITALS
WEIGHT: 153.44 LBS | HEART RATE: 64 BPM | TEMPERATURE: 98.3 F | RESPIRATION RATE: 18 BRPM | BODY MASS INDEX: 30.93 KG/M2 | HEIGHT: 59 IN | SYSTOLIC BLOOD PRESSURE: 120 MMHG | DIASTOLIC BLOOD PRESSURE: 74 MMHG

## 2021-06-05 VITALS
TEMPERATURE: 98.6 F | SYSTOLIC BLOOD PRESSURE: 122 MMHG | WEIGHT: 154 LBS | BODY MASS INDEX: 31.04 KG/M2 | OXYGEN SATURATION: 96 % | HEART RATE: 87 BPM | DIASTOLIC BLOOD PRESSURE: 68 MMHG | HEIGHT: 59 IN

## 2021-06-05 VITALS — BODY MASS INDEX: 29.67 KG/M2 | HEIGHT: 60 IN | WEIGHT: 151.13 LBS

## 2021-06-05 NOTE — TELEPHONE ENCOUNTER
Patient calling.  She is reporting she has now got her 3rd URI since Nov. 28th , 2019.  She reports, her cold has settled into her throat, and now she feels like she has laryngitis.  She reports, she has been taking sudafed  , and that does help.  She reports being concerned , because she is traveling out to [place] tomorrow to see  Her 92 year old mom , and does not want to get her sick. She was advised to wear a mask on the flight , and also while around her mom..  Patient reports she is afebrile. With a dry throat and coughing.    Patient did not want appointment today, just interested in finding out about how contagious she is.    Aminah Martin RN  [facility] Triage/refill nurse

## 2021-06-05 NOTE — TELEPHONE ENCOUNTER
Refill Approved    Rx renewed per Medication Renewal Policy. Medication was last renewed on 9/20/18.    Kelley Bright, [facility] Connection Triage/Med Refill 1/15/2020     Requested Prescriptions   Pending Prescriptions Disp Refills     rosuvastatin (CRESTOR) 10 MG tablet 90 tablet 3     Sig: Take 1 tablet (10 mg total) by mouth every other day.       Statins Refill Protocol (Hmg CoA Reductase Inhibitors) Passed - 1/13/2020  8:38 AM        Passed - PCP or prescribing provider visit in past 12 months      Last office visit with prescriber/PCP: 9/20/2018 Geovanna Luna CNP OR same dept: Visit date not found OR same specialty: 9/20/2018 Geovanna Luna CNP  Last physical: 10/31/2019 Last MTM visit: Visit date not found   Next visit within 3 mo: Visit date not found  Next physical within 3 mo: Visit date not found  Prescriber OR PCP: Geovanna Luna CNP  Last diagnosis associated with med order: 1. Hyperlipidemia  - rosuvastatin (CRESTOR) 10 MG tablet; Take 1 tablet (10 mg total) by mouth every other day.  Dispense: 90 tablet; Refill: 3    If protocol passes may refill for 12 months if within 3 months of last provider visit (or a total of 15 months).

## 2021-06-11 NOTE — PROGRESS NOTES
[address]  [facility] [address]  Dept: [phone]  Dept Fax: [phone]  Primary Provider: Geovanna Luna CNP   Pre-op Performing Provider: RAQUEL ESCOBAR    PREOPERATIVE EVALUATION:  Today's date: 9/17/2020    Pamela Engle is a 70 y.o. female who presents for a preoperative evaluation.    Surgical Information:  Surgery Details 9/15/2020   Surgery/Procedure: TLIF   Surgery Location: [facility] in [place], [place]   Surgeon: Dr. Benjamin Mc   Surgery Date: October 5, 2020   Time of Surgery: TBD   Where patient plans to recover: at home with family     Fax number for surgical facility: Note does not need to be faxed, will be available electronically in Epic.  Type of Anesthesia Anticipated: Choice    Subjective     HPI related to upcoming procedure:     70-year-old female coming in for preop evaluation for lumbar back pain causing right leg pain paresthesia and right foot pain.  The symptoms have been ongoing and present since June 2019.  Patient has been scheduled for fusion surgery with Dr. Mc in October.    Preop Questions 9/15/2020   Have you ever had a heart attack or stroke? No   Have you ever had surgery on your heart or blood vessels, such as a stent placement, a coronary artery bypass, or surgery on an artery in your head, neck, heart, or legs? No   Do you have chest pain with activity? No   Do you have a history of  heart failure? No   Do you currently have a cold, bronchitis or symptoms of other infection? No   Do you have a cough, shortness of breath, or wheezing? No   Do you or anyone in your family have previous history of blood clots? No   Do you or does anyone in your family have a serious bleeding problem such as prolonged bleeding following surgeries or cuts? No   Have you ever had problems with anemia or been told to take iron pills? YES -    Have you had any abnormal blood loss such as black, tarry or bloody stools, or abnormal vaginal bleeding? No   Have  you ever had a blood transfusion? No   Are you willing to have a blood transfusion if it is medically needed before, during, or after your surgery? Yes   Have you or any of your relatives ever had problems with anesthesia? YES -    Do you have sleep apnea, excessive snoring or daytime drowsiness? No   Do you have any artifical heart valves or other implanted medical devices like a pacemaker, defibrillator, or continuous glucose monitor? No   Do you have artificial joints? No   Are you allergic to latex? No   Is there any chance that you may be pregnant? No         Patient does not have a Health Care Directive or Living Will: Discussed advance care planning with patient; however, patient declined at this time.    [id number]}  HYPERLIPIDEMIA - Patient has a long history of significant Hyperlipidemia requiring medication for treatment with recent good control. Patient reports no problems or side effects with the medication.       Review of Systems  Constitutional, neuro, ENT, endocrine, pulmonary, cardiac, gastrointestinal, genitourinary, musculoskeletal, integument and psychiatric systems are negative, except as otherwise noted.      Patient Active Problem List    Diagnosis Date Noted     Nonrheumatic aortic valve stenosis 10/08/2019     Sacralization of lumbar vertebra 09/10/2019     Hyperlipidemia 09/20/2018     Osteoarthritis 09/20/2018     Fuchs' endothelial dystrophy 09/20/2018     Oral herpes 09/20/2018     No past medical history on file.  No past surgical history on file.  Current Outpatient Medications   Medication Sig Dispense Refill     cholecalciferol, vitamin D3, (VITAMIN D3) 400 unit Chew Chew.       multivitamin therapeutic tablet Take 1 tablet by mouth daily.       rosuvastatin (CRESTOR) 10 MG tablet Take 1 tablet (10 mg total) by mouth every other day. 90 tablet 3     acetaminophen (TYLENOL) 500 MG tablet Take 500 mg by mouth every 6 (six) hours as needed for pain.       valACYclovir (VALTREX) 1000 MG  "tablet Take 1 tablet (1,000 mg total) by mouth 2 (two) times a day. 90 tablet 0     No current facility-administered medications for this visit.        Allergies   Allergen Reactions     Hydrocodone-Acetaminophen Swelling       Social History     Tobacco Use     Smoking status: Former Smoker     Packs/day: 1.00     Types: Cigarettes     Start date: 1/1/1969     Last attempt to quit: 02/2010     Years since quitting: 10.6     Smokeless tobacco: Never Used   Substance Use Topics     Alcohol use: Yes     Alcohol/week: 2.0 standard drinks     Types: 2 Shots of liquor per week      Family History   Problem Relation Age of Onset     CABG Mother 77     Arthritis Mother      Kidney disease Mother      Colon cancer Father      Hypertension Father      Hyperlipidemia Father      Prostate cancer Father      Dementia Father      CABG Sister 58     Diabetes Sister      Liver cancer Maternal Grandmother      Lung cancer Paternal Grandfather      Drug abuse Brother      Breast cancer Paternal Grandmother      Coronary Stenting Sister 63        2 stents     Social History     Substance and Sexual Activity   Drug Use No           Objective   /74   Pulse 64   Temp 98.3  F (36.8  C) (Oral)   Resp 18   Ht 4' 11\" (1.499 m)   Wt 153 lb 7 oz (69.6 kg)   BMI 30.99 kg/m    Physical Exam    GENERAL APPEARANCE: healthy, alert and no distress     EYES: EOMI, PERRL     HENT: ear canals and TM's normal and nose and mouth without ulcers or lesions     NECK: no adenopathy, no asymmetry, masses, or scars and thyroid normal to palpation     RESP: lungs clear to auscultation - no rales, rhonchi or wheezes     BREAST: normal without masses, tenderness or nipple discharge and no palpable axillary masses or adenopathy     CV: regular rates and rhythm, normal S1 S2, no S3 or S4 and no murmur, click or rub     ABDOMEN:  soft, nontender, no HSM or masses and bowel sounds normal     MS: extremities normal- no gross deformities noted, no evidence " of inflammation in joints, FROM in all extremities.     SKIN: no suspicious lesions or rashes     NEURO: Normal strength and tone, sensory exam grossly normal, mentation intact and speech normal     PSYCH: mentation appears normal. and affect normal/bright     LYMPHATICS: No cervical adenopathy    Recent Labs   Lab Test 10/31/19  1135 09/11/19  0845 12/18/18  1438   HGB 13.4  --  12.5   PLT  --   --  406   NA  --  144  --    K  --  5.0  --    CREATININE  --  0.71  --         PRE-OP Diagnostics:   Labs pending at this time. Results will be reviewed when available.  EKG: sinus bradycardia, normal axis, normal intervals, no acute ST/T changes c/w ischemia         Assessment & Plan       The proposed surgical procedure is considered INTERMEDIATE risk.    REVISED CARDIAC RISK INDEX   The patient has the following serious cardiovascular risks for perioperative complications:  No serious cardiac risks = 0 points    INTERPRETATION: 0 points: Class I (very low risk - 0.4% complication rate)    1. Preop general physical exam    - Electrocardiogram Perform and Read  - Basic Metabolic Panel  - HM1(CBC and Differential)  - HM1 (CBC with Diff)      The patient has the following additional risks and recommendations for perioperative complications:     - No identified additional risk factors other than previously addressed     MEDICATION INSTRUCTIONS:  Patient is on no chronic medications    RECOMMENDATION:  APPROVAL GIVEN to proceed with proposed procedure, without further diagnostic evaluation.    No follow-ups on file.    Signed Electronically by: Micha Goldstein MD    Copy of this evaluation report is provided to requesting physician.    [facility] Pre[facility]    Revised Cardiac Risk Index

## 2021-06-11 NOTE — TELEPHONE ENCOUNTER
Left message for patient to call back so we can help her schedule her pre-op appointment. She can schedule with Geovanna Luna if she has any openings or with any other provider.     Also informed patient that she will not be able to combine her pre-op and her annual physical. These have to be two separate appointments.     BOB/ROCAEL

## 2021-06-11 NOTE — TELEPHONE ENCOUNTER
New Appointment Needed  What is the reason for the visit:    Pre-Op Appt Request  When is the surgery? :  10-05-20  Where is the surgery?:   [facility]  Who is the surgeon? :  Dr. LATA Mc  What type of surgery is being done?: TLIF  Provider Preference: Any available  How soon do you need to be seen?: next week  Waitlist offered?: No  Okay to leave a detailed message:  Yes      Patient would also like to know if she could combine her pre-op with her annual physical.

## 2021-06-11 NOTE — PATIENT INSTRUCTIONS - HE
Please call with any questions    Do not take any anti-inflammatories which include ibuprofen, aspirin, Naprosyn or Aleve for 1 week before the surgery      Please do not eat any heavy meals on the day of your surgery you can have clear fluids up to 6 hours before your surgery

## 2021-06-12 NOTE — ANESTHESIA POSTPROCEDURE EVALUATION
Patient: Pamela Engle  Procedure(s):  RIGHT LUMBAR 3-4  TRANSFORAMINAL LUMBAR INTERBODY FUSION (Right)  Anesthesia type: general    Patient location: PACU  Last vitals:   Vitals Value Taken Time   /58 10/05/20 1050   Temp 36.6  C (97.8  F) 10/05/20 1030   Pulse 67 10/05/20 1058   Resp 16 10/05/20 1030   SpO2 97 % 10/05/20 1059   Vitals shown include unvalidated device data.  Post vital signs: stable  Level of consciousness: awake and responds to simple questions  Post-anesthesia pain: pain controlled  Post-anesthesia nausea and vomiting: no  Pulmonary: unassisted, return to baseline  Cardiovascular: stable and blood pressure at baseline  Hydration: adequate  Anesthetic events: no    QCDR Measures:  ASA# 11 - Shanna-op Cardiac Arrest: ASA11B - Patient did NOT experience unanticipated cardiac arrest  ASA# 12 - Shanna-op Mortality Rate: ASA12B - Patient did NOT die  ASA# 13 - PACU Re-Intubation Rate: ASA13B - Patient did NOT require a new airway mgmt  ASA# 10 - Composite Anes Safety: ASA10A - No serious adverse event    Additional Notes:

## 2021-06-12 NOTE — TELEPHONE ENCOUNTER
Have we received this exception from [employer] form?  If so, Please bring to CMT as this needs to be expediated. Thanks

## 2021-06-12 NOTE — ANESTHESIA PREPROCEDURE EVALUATION
Anesthesia Evaluation      Patient summary reviewed   No history of anesthetic complications     Airway   Mallampati: I  Neck ROM: full   Pulmonary - negative ROS and normal exam                          Cardiovascular - negative ROS and normal exam  Exercise tolerance: > or = 4 METS  (+) , hypercholesterolemia,     (-) murmur  ECG reviewed  Rhythm: regular  Rate: normal,    no murmur      Neuro/Psych - negative ROS     Endo/Other - negative ROS      GI/Hepatic/Renal - negative ROS      Other findings: Fuchs endothelial dystrophy      Dental - normal exam                        Anesthesia Plan  Planned anesthetic: general endotracheal    ASA 2     Anesthetic plan and risks discussed with: patient and spouse  Anesthesia plan special considerations: antiemetics,   Post-op plan: routine recovery

## 2021-06-12 NOTE — TELEPHONE ENCOUNTER
Forms Request  Name of form/paperwork: Other:  Tier one exception from [employer] to receive a Shingrix vaccine.  Have you been seen for this request: The   Do we have the form: The patient states [employer] faxed the form yesterday, 11/4/20.  When is form needed by: The patient states within 14 days, before 11/15/20-The patient is leaving for [place].  How would you like the form returned: Fax:  The patient states the fax is on the form.  Patient Notified form requests are processed in 3-5 business days: Yes    Okay to leave a detailed message? Yes

## 2021-06-12 NOTE — ANESTHESIA CARE TRANSFER NOTE
Last vitals:   Vitals:    10/05/20 0908   BP: 147/67   Pulse: 80   Resp: 12   Temp: 36.6  C (97.9  F)   SpO2: 100%     Patient's level of consciousness is drowsy  Spontaneous respirations: yes  Maintains airway independently: yes  Dentition unchanged: yes  Oropharynx: oropharynx clear of all foreign objects    QCDR Measures:  ASA# 20 - Surgical Safety Checklist: WHO surgical safety checklist completed prior to induction    PQRS# 430 - Adult PONV Prevention: 4558F - Pt received => 2 anti-emetic agents (different classes) preop & intraop  ASA# 8 - Peds PONV Prevention: NA - Not pediatric patient, not GA or 2 or more risk factors NOT present  PQRS# 424 - Shanna-op Temp Management: 4559F - At least one body temp DOCUMENTED => 35.5C or 95.9F within required timeframe  PQRS# 426 - PACU Transfer Protocol: - Transfer of care checklist used  ASA# 14 - Acute Post-op Pain: ASA14B - Patient did NOT experience pain >= 7 out of 10

## 2021-06-13 NOTE — TELEPHONE ENCOUNTER
Patient Returning Call  Reason for call:  Return call   Information relayed to patient:  Caller was informed of message below.   Patient has additional questions:  Yes  If YES, what are your questions/concerns:  Caller stated that the copay was $100 and she was told to have Geovanna Luna CNP fill out that form to submit to part B to reclassify as Tier 1.   Okay to leave a detailed message?: Yes

## 2021-06-13 NOTE — TELEPHONE ENCOUNTER
Writer left detailed message to advise caller per MD note below per patient/caller request.   When patient calls back, collect this information and route to pool. Thanks.

## 2021-06-13 NOTE — TELEPHONE ENCOUNTER
Received the form . I need more information to complete this . The form implies the Shingles vaccine is covered under her part D but not part B as it is for almost all Medicare members. Can she explain more information about why she needs this covered under part B? Why she cannot receive it at the pharmacy? I am happy to fill it out once I know the background

## 2021-06-14 NOTE — PROGRESS NOTES
Assessment and Plan:   1. Routine general medical examination at a health care facility  Non-fasting labs. Mammogram scheduled.  Referral for CRC screening d/t h/o polyps and FMH in father.  Last in 05/2016.  DEXA ordered.  Will get Shingrix at pharmacy    2. S/P lumbar fusion  Recovering well.  Has begun exercising regularly and has felt well    3. Personal history of colonic polyps  - Ambulatory referral for Colonoscopy    4. Post-menopausal  Surgeon noted concern with bone density.  Normal in 2017 per her report.  Update DEXA. Increase calcium supplement to twice daily. Check vitamin D  - DXA Bone Density Scan; Future  - Vitamin D, Total (25-Hydroxy)    5. Nonrheumatic aortic valve stenosis  Plan for recheck echocardiogram in September of this year, two year schedule.  Asymptomatic    6. Pure hypercholesterolemia  - Comprehensive Metabolic Panel    7. Screening for diabetes mellitus  - Glycosylated Hemoglobin A1c    8. Abnormal finding of blood chemistry, unspecified   - Glycosylated Hemoglobin A1c    9. Body mass index (BMI) 31.0-31.9, adult   - Vitamin D, Total (25-Hydroxy)    The patient's current medical problems were reviewed.    The following high BMI interventions were performed this visit: encouragement to exercise  The following health maintenance schedule was reviewed with the patient and provided in printed form in the after visit summary:   Health Maintenance   Topic Date Due     HEPATITIS C SCREENING  1950     DEXA SCAN  1950     ZOSTER VACCINES (2 of 3) 08/21/2012     MAMMOGRAM  05/21/2021     MEDICARE ANNUAL WELLNESS VISIT  01/13/2022     FALL RISK ASSESSMENT  01/13/2022     TD 18+ HE  07/27/2022     LIPID  09/11/2024     ADVANCE CARE PLANNING  10/05/2025     COLORECTAL CANCER SCREENING  05/16/2028     Pneumococcal Vaccine: 65+ Years  Completed     INFLUENZA VACCINE RULE BASED  Completed     Pneumococcal Vaccine: Pediatrics (0 to 5 Years) and At-Risk Patients (6 to 64 Years)  Aged  Out     HEPATITIS B VACCINES  Aged Out        Subjective:   Chief Complaint: Pamela Engle is an 70 y.o. female here for an Annual Wellness visit.   HPI:   Doing well.  Spent some time in [place] where her mom lives after lumbar fusion.  Kids in [place].  .     S/p lumbar fusion in 10/2020.  She states she has been doing quite well.  Pain is significantly relieved.  Now able to exercise again and feeling good.  States her surgeon told her that her bones were quite brittle and recommended DEXA.  Last scan in 2017 per her report ([place]) and normal.      CRC screenin, polyps.  No report.  Father  of colon cancer.     Planning for knee replacement possibly this fall.     Aortic valve stenosis:  Mild to moderate aortic stenosis and tricuspid insufficiency identified on echocardiogram after murmur noted at physical. Consulted with cardiology in  and will monitor with serial echocardiographic studies bi-annually.  Asymptomatic.    Weight: Exercising regularly again after lumbar fusion.  Trying to cook more vegetable based meals. Prep ahead of time to be less tempted     HM:  DEXA due, last in .  Shingrix.     Review of Systems:  Please see above.  The rest of the review of systems are negative for all systems.    Patient Care Team:  Geovanna Luna, ARCHANA as PCP - General (Nurse Practitioner)  Lonny Yadav MD as Physician ([facility])  Patricia Yang MD as Physician (Dermatology)  Jose Mcneal DO as Physician (Orthopedic Surgery)  Travis Griffiths DO as Physician (Orthopedic Surgery)  Ganga Cortez MD as Physician (Ophthalmology)  Yeison Hernandez MD as Assigned Heart and Vascular Provider  Micha Goldstein MD as Assigned PCP     Patient Active Problem List   Diagnosis     Hyperlipidemia     Osteoarthritis     Fuchs' endothelial dystrophy     Oral herpes     Sacralization of lumbar vertebra     Nonrheumatic aortic valve stenosis     Neurogenic claudication due to  lumbar spinal stenosis     Heart murmur     Primary insomnia     S/P lumbar fusion     Past Medical History:   Diagnosis Date     History of anesthesia complications     Tremors after surgery      Primary insomnia 10/5/2020      No past surgical history on file.   Family History   Problem Relation Age of Onset     CABG Mother 77     Arthritis Mother      Kidney disease Mother      Colon cancer Father      Hypertension Father      Hyperlipidemia Father      Prostate cancer Father      Dementia Father      CABG Sister 58     Diabetes Sister      Liver cancer Maternal Grandmother      Lung cancer Paternal Grandfather      Drug abuse Brother      Breast cancer Paternal Grandmother      Coronary Stenting Sister 63        2 stents      Social History     Socioeconomic History     Marital status:      Spouse name: Not on file     Number of children: Not on file     Years of education: Not on file     Highest education level: Not on file   Occupational History     Not on file   Social Needs     Financial resource strain: Not on file     Food insecurity     Worry: Not on file     Inability: Not on file     Transportation needs     Medical: Not on file     Non-medical: Not on file   Tobacco Use     Smoking status: Former Smoker     Packs/day: 1.00     Types: Cigarettes     Start date: 1/1/1969     Quit date: 02/2010     Years since quitting: 10.9     Smokeless tobacco: Never Used   Substance and Sexual Activity     Alcohol use: Yes     Alcohol/week: 2.0 standard drinks     Types: 2 Shots of liquor per week     Drug use: No     Sexual activity: Not on file   Lifestyle     Physical activity     Days per week: Not on file     Minutes per session: Not on file     Stress: Not on file   Relationships     Social connections     Talks on phone: Not on file     Gets together: Not on file     Attends Jainism service: Not on file     Active member of club or organization: Not on file     Attends meetings of clubs or  "organizations: Not on file     Relationship status: Not on file     Intimate partner violence     Fear of current or ex partner: Not on file     Emotionally abused: Not on file     Physically abused: Not on file     Forced sexual activity: Not on file   Other Topics Concern     Not on file   Social History Narrative     Not on file      Current Outpatient Medications   Medication Sig Dispense Refill     calcium carbonate (OS-HUGO) 600 mg calcium (1,500 mg) tablet Take 600 mg by mouth daily.       cholecalciferol, vitamin D3, (CHOLECALCIFEROL) 400 unit Tab Take 400 Units by mouth daily.        diphenhydrAMINE HCL (ZZZQUIL) 50 mg/30 mL Liqd Take 15 mL by mouth at bedtime as needed (Sleep).       gabapentin (NEURONTIN) 300 MG capsule Take 300 mg by mouth 4 (four) times a day.       multivit-min-folic acid-biotin (HAIR,SKIN AND NAILS,FA-BIOTIN,) 66.7-1,666.7 mcg Tab Take 1 tablet by mouth daily.       multivitamin therapeutic tablet Take 1 tablet by mouth daily.       rosuvastatin (CRESTOR) 10 MG tablet Take 1 tablet (10 mg total) by mouth every other day. 90 tablet 3     valACYclovir (VALTREX) 1000 MG tablet Take 2,000 mg by mouth 2 (two) times a day as needed.       acetaminophen (TYLENOL) 500 MG tablet Take 1 tablet (500 mg total) by mouth every 4 (four) hours.  0     No current facility-administered medications for this visit.       Objective:   Vital Signs:   Visit Vitals  /68 (Patient Site: Left Arm, Patient Position: Sitting, Cuff Size: Adult Regular)   Pulse 87   Temp 98.6  F (37  C) (Oral)   Ht 4' 11.06\" (1.5 m)   Wt 154 lb (69.9 kg)   SpO2 96%   BMI 31.05 kg/m           VisionScreening:  No exam data present     PHYSICAL EXAM  GENERAL: Alert, well appearing  PSYCH: Pleasant mood, affect appropriate.  SKIN: No atypical lesions  EYES: Conjunctiva pink, sclera white, no exudates. CHUCK.  EOMs intact.   EARS: TMs pearly grey, no bulging, redness, retraction.  MOUTH: Pharynx moist, pink without exudate. No " tonsillar enlargement  NECK: No lymphadenopathy. Thyroid borders smooth without enlargement, nodules.   CV: Regular rate and rhythm without murmurs, rubs or gallops.  RESP: Lung sounds clear  ABDOMEN: BS+. Abdomen soft, nontender to palpation without guarding. No organomegaly, masses or hernias  PV :  No edema  BREASTS: Breasts symmetric, no dimpling, masses or skin discolorations seen. Areolas and nipples symmetric without discharge. On palpation, breast tissue dense just superior to mammary folds. No masses or nodules. Axillary and epitrochlear lymph nodes nonpalpable.       Assessment Results 1/13/2021   Activities of Daily Living No help needed   Instrumental Activities of Daily Living No help needed   Get Up and Go Score Less than 12 seconds   Mini Cog Total Score 5   Some recent data might be hidden     A Mini-Cog score of 0-2 suggests the possibility of dementia, score of 3-5 suggests no dementia    Identified Health Risks:     The patient was counseled and encouraged to consider modifying their diet and eating habits. She was provided with information on recommended healthy diet options.  Patient's advanced directive was discussed and I am comfortable with the patient's wishes.

## 2021-06-15 NOTE — PROGRESS NOTES
Pamela Engle is a 70 y.o. female who is being evaluated via a billable telephone visit.      What phone number would you like to be contacted at? [phone]   How would you like to obtain your AVS? AVS Preference: MyChart.    1. Osteopetrosis  Reviewed DEXA scan and recent lab results.  Osteoporosis with high predicted fracture risk.  Recommended increasing current vitamin D supplement by 1000IU.  Continue with calcium and weight bearing exercise.  Counseled on risks, benefits, side effects of bisphosphonate therapy. No contraindications to use.  Start alendronate weekly.  Follow up BMD scan in 1-2 years.    - alendronate (FOSAMAX) 70 MG tablet; Take 1 tablet (70 mg total) by mouth every 7 days.  Dispense: 12 tablet; Refill: 3    Subjective   Pamela Engle is 70 y.o. and presents today for the following health issues   HPI     Danni is following up today to discuss the results of her DEXA scan (below).  This was her first DEXA scan.  Vitamin D normal though slightly suboptimal at 34.2.  Calcium 1200mg/day.  Active typically with weight bearing exercise though has been more difficult since returning to [place] (from [place]) with the winter weather. A friend is wanting to get her involved in [employer] and she wonders if this is safe.     1. The spine bone density is assessed using L1-L2 with T-score of -1.6.  2. Femoral bone densities show left femoral neck T- score of -1.4, and right femoral neck T-score of -1.9..  3.  bone density was also checked in the wrist as an alternate site since the spine measurement was limited due to postsurgical change.  In the left 33% radius, T score is -2.8.  4.  She does not have a previous scan available for comparison.  5.  The trabecular bone score suggests poor micro architecture.     70 y.o. female with OSTEOPOROSIS and HIGH predicted future fracture risk.           Review of Systems  Negative unless otherwise noted in HPI      Objective       Vitals:  No vitals were obtained today  due to virtual visit.    Physical Exam  NA dt phone visit             Phone call duration: 15 minutes;

## 2021-06-16 NOTE — TELEPHONE ENCOUNTER
Reason for Call:  Medication or medication refill:NEW PHARM with new insurance    Do you use a [facility]?  Name of the pharmacy and phone number for the current request: [facility] in [place]    Name of the medication requested:   rosuvastatin (CRESTOR) 10 MG tablet 90 tablet 3 1/15/2020  No   Sig - Route: Take 1 tablet (10 mg total) by mouth every other day. - Oral         Other request: na    Can we leave a detailed message on this number? Yes    Phone number patient can be reached at:   Cell number on file:    Telephone Information:   Mobile [phone]       Best Time: any    Call taken on 3/17/2021 at 9:18 AM by Pamela Behr

## 2021-06-16 NOTE — TELEPHONE ENCOUNTER
RN cannot approve Refill Request    RN can NOT refill this medication med is not covered by policy/route to provider. Last office visit: 2/22/2021 Geovanna Luna CNP Last Physical: 1/13/2021 Last MTM visit: Visit date not found Last visit same specialty: Visit date not found.  Next visit within 3 mo: Visit date not found  Next physical within 3 mo: Visit date not found      Tammie F Severson, [facility] Connection Triage/Med Refill 3/18/2021    Requested Prescriptions   Pending Prescriptions Disp Refills     rosuvastatin (CRESTOR) 10 MG tablet 90 tablet 3     Sig: Take 1 tablet (10 mg total) by mouth every other day.       There is no refill protocol information for this order

## 2021-06-17 NOTE — PATIENT INSTRUCTIONS - HE
Patient Instructions by Geovanna Luna CNP at 9/10/2019 10:00 AM     Author: Geovanna Luna CNP Service: -- Author Type: Nurse Practitioner    Filed: 9/10/2019 10:52 AM Encounter Date: 9/10/2019 Status: Signed    : Geovanna Luna CNP (Nurse Practitioner)       Recommendations from today's visit                                                       1. Check with insurance on coverage for Shingrix vaccine.      2. We will schedule you for an echocardiogram to assess the murmur I heard on your exam today.     3. Continue to keep your body moving! Try to incorporate regular exercise at least 3-4 times a week with a focus on resistance training.  Focus on high vegetable, fruit and protein diet    4.  The mass on your chest is consistent with a lipoma. We will continue to monitor this for growth and please come in sooner than your annual physical if you note any rapid growth.     Next appointment: one year, annual physical    To reschedule your appointment, please call the clinic directly at [phone].   It was a pleasure seeing you today! I look forward to seeing you again.          Patient Education     Exercise for a Healthier Heart  You may wonder how you can improve the health of your heart. If youre thinking about exercise, youre on the right track. You dont need to become an athlete, but you do need a certain amount of brisk exercise to help strengthen your heart. If you have been diagnosed with a heart condition, your doctor may recommend exercise to help stabilize your condition. To help make exercise a habit, choose safe, fun activities.       Be sure to check with your health care provider before starting an exercise program.    Why exercise?  Exercising regularly offers many healthy rewards. It can help you do all of the following:    Improve your blood cholesterol levels to help prevent further heart trouble    Lower your blood pressure to help prevent a stroke or heart attack    Control diabetes, or  reduce your risk of getting this disease    Improve your heart and lung function    Reach and maintain a healthy weight    Make your muscles stronger and more limber so you can stay active    Prevent falls and fractures by slowing the loss of bone mass (osteoporosis)    Manage stress better  Exercise tips  Ease into your routine. Set small goals. Then build on them.  Exercise on most days. Aim for a total of 150 or more minutes of moderate to  vigorous intensity activity each week. Consider 40 minutes, 3 to 4 times a week. For best results, activity should last for 40 minutes on average. It is OK to work up to the 40 minute period over time. Examples of moderate-intensity activity is walking one mile in 15 minutes or 30 to 45 minutes of yard work.  Step up your daily activity level. Along with your exercise program, try being more active throughout the day. Walk instead of drive. Do more household tasks or yard work.  Choose one or more activities you enjoy. Walking is one of the easiest things you can do. You can also try swimming, riding a bike, or taking an exercise class.  Stop exercising and call your doctor if you:    Have chest pain or feel dizzy or lightheaded    Feel burning, tightness, pressure, or heaviness in your chest, neck, shoulders, back, or arms    Have unusual shortness of breath    Have increased joint or muscle pain    Have palpitations or an irregular heartbeat      3666-3172 [employer]. [address] [place] [address]. All rights reserved. This information is not intended as a substitute for professional medical care. Always follow your healthcare professional's instructions.         Patient Education   Understanding USDA MyPlate  The USDA (US Department of Agriculture) has guidelines to help you make healthy food choices. These are called MyPlate. MyPlate shows the food groups that make up healthy meals using the image of a place setting. Before you eat, think about  the healthiest choices for what to put onto your plate or into your cup or bowl. To learn more about building a healthy plate, visit www.choosemyplate.gov.       The Food Groups    Fruits: Any fruit or 100% fruit juice counts as part of the Fruit Group. Fruits may be fresh, canned, frozen, or dried, and may be whole, cut-up, or pureed. Make half your plate fruits and vegetables.    Vegetables: Any vegetable or 100% vegetable juice counts as a member of the Vegetable Group. Vegetables may be fresh, frozen, canned, or dried. They can be served raw or cooked and may be whole, cut-up, or mashed. Make half your plate fruits and vegetables.     Grains: All foods made from grains are part of the Grains Group. These include wheat, rice, oats, cornmeal, and barley such as bread, pasta, oatmeal, cereal, tortillas, and grits. Grains should be no more than a quarter of your plate. At least half of your grains should be whole grains.    Protein: This group includes meat, poultry, seafood, beans and peas, eggs, processed soy products (like tofu), nuts (including nut butters), and seeds. Make protein choices no more than a quarter of your plate. Meat and poultry choices should be lean or low fat.    Dairy: All fluid milk products and foods made from milk that contain calcium, like yogurt and cheese are part of the Dairy Group. (Foods that have little calcium, such as cream, butter, and cream cheese, are not part of the group.) Most dairy choices should be low-fat or fat-free.    Oils: These are fats that are liquid at room temperature. They include canola, corn, olive, soybean, and sunflower oil. Foods that are mainly oil include mayonnaise, certain salad dressings, and soft margarines. You should have only 5 to 7 teaspoons of oils a day. You probably already get this much from the food you eat.  Use [employer]let to Help Build Your Meals  The [employer] can help you plan and track your meals and activity. You can look up  individual foods to see or compare their nutritional value. You can get guidelines for what and how much you should eat. You can compare your food choices. And you can assess personal physical activities and see ways you can improve. Go to www.[employer].gov/supertracker/.    6005-8013 [employer]. [address] [place] [address]. All rights reserved. This information is not intended as a substitute for professional medical care. Always follow your healthcare professional's instructions.           Patient Education   Signs of Hearing Loss  Hearing loss is a problem shared by many people. In fact, it is one of the most common health conditions, particularly as people age. Most people over age 65 have some hearing loss, and by age 80, almost everyone does. Because hearing loss usually occurs slowly over the years, you may not realize your hearing ability has gotten worse.       Have your hearing checked  Contact your [facility] care provider if you:    Have to strain to hear normal conversation.    Have to watch other peoples faces very carefully to follow what theyre saying.    Need to ask people to repeat what theyve said.    Often misunderstand what people are saying.    Turn the volume of the television or radio up so high that others complain.    Feel that people are mumbling when theyre talking to you.    Find that the effort to hear leaves you feeling tired and irritated.    Notice, when using the phone, that you hear better with 1 ear than the other.    1905-5117 [employer]. [address] [place] [address]. All rights reserved. This information is not intended as a substitute for professional medical care. Always follow your healthcare professional's instructions.         Patient Education   Preventing Falls in the Home  As you get older, falls are more likely. Thats because your reaction time slows. Your muscles and joints may also get stiffer, making them less  flexible. Illness, medications, and vision changes can also affect your balance. A fall could leave you unable to live on your own. To make your home safer, follow these tips:    Floors    Put nonskid pads under area rugs.    Remove throw rugs.    Replace worn floor coverings.    Tack carpets firmly to each step on carpeted stairs. Put nonskid strips on the edges of uncarpeted stairs.    Keep floors and stairs free of clutter and cords.    Arrange furniture so there are clear pathways.    Clean up any spills right away.    Bathrooms    Install grab bars in the tub or shower.    Apply nonskid strips or put a nonskid rubber mat in the tub or shower.    Sit on a bath chair to bathe.    Use bathmats with nonskid backing.    Lighting    Keep a flashlight in each room.    Put a nightlight along the pathway between the bedroom and the bathroom.    6119-0187 [employer]. [address] [place] [address]. All rights reserved. This information is not intended as a substitute for professional medical care. Always follow your healthcare professional's instructions.           Advance Directive  Patients advance directive was discussed and I am comfortable with the patients wishes.  Patient Education   Personalized Prevention Plan  You are due for the preventive services outlined below.  Your care team is available to assist you in scheduling these services.  If you have already completed any of these items, please share that information with your care team to update in your medical record.  Health Maintenance   Topic Date Due   ? HEPATITIS C SCREENING  1950   ? ZOSTER VACCINES (2 of 3) 08/21/2012   ? MEDICARE ANNUAL WELLNESS VISIT  07/31/2016   ? DXA SCAN  07/31/2016   ? PNEUMOCOCCAL POLYSACCHARIDE VACCINE AGE 65 AND OVER  07/31/2016   ? INFLUENZA VACCINE RULE BASED (1) 08/01/2019   ? FALL RISK ASSESSMENT  09/10/2020   ? MAMMOGRAM  05/21/2021   ? TD 18+ HE  07/27/2022   ? COLONOSCOPY  05/16/2023   ?  ADVANCE CARE PLANNING  09/10/2024   ? PNEUMOCOCCAL CONJUGATE VACCINE FOR ADULTS (PCV13 OR PREVNAR)  Completed

## 2021-06-18 NOTE — PATIENT INSTRUCTIONS - HE
Patient Instructions by Geovanna Luna CNP at 1/13/2021  1:20 PM     Author: Geovanna Luna CNP Service: -- Author Type: Nurse Practitioner    Filed: 1/13/2021  1:39 PM Encounter Date: 1/13/2021 Status: Signed    : Geovanna Luna CNP (Nurse Practitioner)         Patient Education   Understanding USDA MyPlate  The USDA (US Department of Agriculture) has guidelines to help you make healthy food choices. These are called MyPlate. MyPlate shows the food groups that make up healthy meals using the image of a place setting. Before you eat, think about the healthiest choices for what to put onto your plate or into your cup or bowl. To learn more about building a healthy plate, visit www.choosemyplate.gov.       The Food Groups    Fruits: Any fruit or 100% fruit juice counts as part of the Fruit Group. Fruits may be fresh, canned, frozen, or dried, and may be whole, cut-up, or pureed. Make half your plate fruits and vegetables.    Vegetables: Any vegetable or 100% vegetable juice counts as a member of the Vegetable Group. Vegetables may be fresh, frozen, canned, or dried. They can be served raw or cooked and may be whole, cut-up, or mashed. Make half your plate fruits and vegetables.     Grains: All foods made from grains are part of the Grains Group. These include wheat, rice, oats, cornmeal, and barley such as bread, pasta, oatmeal, cereal, tortillas, and grits. Grains should be no more than a quarter of your plate. At least half of your grains should be whole grains.    Protein: This group includes meat, poultry, seafood, beans and peas, eggs, processed soy products (like tofu), nuts (including nut butters), and seeds. Make protein choices no more than a quarter of your plate. Meat and poultry choices should be lean or low fat.    Dairy: All fluid milk products and foods made from milk that contain calcium, like yogurt and cheese are part of the Dairy Group. (Foods that have little calcium, such as cream, butter,  and cream cheese, are not part of the group.) Most dairy choices should be low-fat or fat-free.    Oils: These are fats that are liquid at room temperature. They include canola, corn, olive, soybean, and sunflower oil. Foods that are mainly oil include mayonnaise, certain salad dressings, and soft margarines. You should have only 5 to 7 teaspoons of oils a day. You probably already get this much from the food you eat.  Use [employer] to Help Build Your Meals  The [employer] can help you plan and track your meals and activity. You can look up individual foods to see or compare their nutritional value. You can get guidelines for what and how much you should eat. You can compare your food choices. And you can assess personal physical activities and see ways you can improve. Go to www.[employer].gov/[employer]ory[employer]cker/.    1780-7852 [employer] [address] [place] [address]. All rights reserved. This information is not intended as a substitute for professional medical care. Always follow your healthcare professional's instructions.             Advance Directive  Patients advance directive was discussed and I am comfortable with the patients wishes.  Patient Education   Personalized Prevention Plan  You are due for the preventive services outlined below.  Your care team is available to assist you in scheduling these services.  If you have already completed any of these items, please share that information with your care team to update in your medical record.  Health Maintenance   Topic Date Due   ? HEPATITIS C SCREENING  1950   ? DEXA SCAN  1950   ? ZOSTER VACCINES (2 of 3) 08/21/2012   ? MAMMOGRAM  05/21/2021   ? MEDICARE ANNUAL WELLNESS VISIT  01/13/2022   ? FALL RISK ASSESSMENT  01/13/2022   ? TD 18+ HE  07/27/2022   ? LIPID  09/11/2024   ? ADVANCE CARE PLANNING  10/05/2025   ? COLORECTAL CANCER SCREENING  05/16/2028   ? Pneumococcal Vaccine: 65+ Years  Completed   ? INFLUENZA  VACCINE RULE BASED  Completed   ? Pneumococcal Vaccine: Pediatrics (0 to 5 Years) and At-Risk Patients (6 to 64 Years)  Aged Out   ? HEPATITIS B VACCINES  Aged Out

## 2021-06-20 NOTE — PROGRESS NOTES
Assessment & Plan   1. Osteoarthritis:  Referral to new orthopedic provider to establish care. She has had good success with cortisone injections in the past. Discussed importance of daily exercise in control of pain, she has recently started a new class and hopes to get back into regular exercise. May continue NSAIDs as needed, discussed scheduled acetaminophen as well.    - Ambulatory referral to Orthopedics    2. Hyperlipidemia;  Previous lipid panel within normal range, she has been on this for many years.  Will plan to recheck at annual exam next year.   - rosuvastatin (CRESTOR) 10 MG tablet; Take 1 tablet (10 mg total) by mouth every other day.  Dispense: 90 tablet; Refill: 3    3. Fuchs' endothelial dystrophy  - Ambulatory referral to Ophthalmology    4. Skin cancer screening  - Ambulatory referral to Dermatology    5. Oral herpes  - valACYclovir (VALTREX) 1000 MG tablet; Take 1 tablet (1,000 mg total) by mouth 2 (two) times a day.  Dispense: 10 tablet; Refill: 3    Geovanna Luna CNP     Total Time: 30 minutes.  Coordination of Care Time: 30 minutes spent including:  History, exam, discussion of possible diagnoses, testing today, next steps and follow up.    Subjective   Chief Complaint:  Establish Care and Arthritis    HPI:   Pamela Engle is a 68 y.o. female who presents for establish care.   She is a new patient to the clinic. She has previously been seen in [place], records requested.  PMH notable for arthritis of the knees, hips and hands, IBS, hyperlipidemia, recurrent HSV1, otherwise negative.  She is . Previously worked as a  and retired earlier this year. Moved to [place] to be closer to family     Osteoarthritis: H/o knee, hip, finger arthritis. Has received cortisone injections in the past. Takes ibuprofen 1-2x/week. Tries to use this sparingly.  Was not exercising at all this summer as she was watching her grandson and she feels joint pain has worsened. Requests referral for  "orthopedics to establish care.     Hyperlipidemia:  On rosuvastatin for many years.  She brings in a lipid profile from her physical last year that is within goal range. No personal history of HTN. FMH of CAD in mother and sister. She does have a family history of diabetes as well, screening has been negative.     Fuchs endothelial dystrophy:  In need of new ophthalmologist.     HSV1:  Recurrent outbreaks, Valtrex is helpful. She does experience prodromal symptoms in one eye.     Health maintenance:  Colonoscopy in 2016, mammogram 2017,     Allergies:  is allergic to hydrocodone-acetaminophen.    SH/FH:  Social History and Family History reviewed and updated.   Tobacco Status:  She  reports that she quit smoking about 8 years ago. She has never used smokeless tobacco.    Review of Systems:  A complete head to toe ROS is negative unless otherwise noted in HPI    Objective     Vitals:    09/20/18 1003   BP: 124/80   Patient Site: Left Arm   Patient Position: Sitting   Cuff Size: Adult Regular   Pulse: (!) 57   SpO2: 97%   Weight: 149 lb 8 oz (67.8 kg)   Height: 4' 11.25\" (1.505 m)       Physical Exam:  GENERAL: Alert, well-appearing female   CV: Regular rate and rhythm without murmurs, rubs or gallops.  RESP: Lung sounds clear  PV : No edema          "

## 2021-06-22 NOTE — PROGRESS NOTES
Preoperative Exam    Scheduled Procedure: R rotator cuff repair  Surgery Date:  1/2/19  [facility]    Surgeon:  Dr. Lance Yadav    Assessment/Plan:   1. Tear of right rotator cuff, unspecified tear extent  - Electrocardiogram Perform - Clinic  - 2(CBC w/o Differential)    2. Encounter for preoperative examination for general surgical procedure  - Electrocardiogram Perform - Clinic  - 2(CBC w/o Differential)    Surgical Procedure Risk: Intermediate (reported cardiac risk generally 1-5%)  Have you had prior anesthesia?: Yes  Have you or any family members had a previous anesthesia reaction:  Yes: vasovagal tremors  Do you or any family members have a history of a clotting or bleeding disorder?: No  Cardiac Risk Assessment: no increased risk for major cardiac complications    Patient approved for surgery with general or local anesthesia.    Please Note:    Functional Status: Independent  Patient plans to recover at home with family.     Subjective:      Pamela Engle is a 68 y.o. female who presents for a preoperative consultation.      She has followed with [facility] for right shoulder pain.  MRI showed high grade tear of supraspinatus, infraspinatus.  Will be undergoing surgical repair.      She has felt well.  Recent URI with cough though is on the tail end of this.  No fever or shortness of breath.      All other systems reviewed and are negative, other than those listed in the HPI.    Pertinent History  Do you have difficulty breathing or chest pain after walking up a flight of stairs: No  History of obstructive sleep apnea: No  Steroid use in the last 6 months: No  Frequent Aspirin/NSAID use: No  Prior Blood Transfusion: No  Prior Blood Transfusion Reaction: No  If for some reason prior to, during or after the procedure, if it is medically indicated, would you be willing to have a blood transfusion?:  There is no transfusion refusal.    Current Outpatient  "Medications   Medication Sig Dispense Refill     cholecalciferol, vitamin D3, (VITAMIN D3) 400 unit Chew Chew.       multivitamin therapeutic tablet Take 1 tablet by mouth daily.       rosuvastatin (CRESTOR) 10 MG tablet Take 1 tablet (10 mg total) by mouth every other day. 90 tablet 3     valACYclovir (VALTREX) 1000 MG tablet Take 1 tablet (1,000 mg total) by mouth 2 (two) times a day. 10 tablet 3     No current facility-administered medications for this visit.         Allergies   Allergen Reactions     Hydrocodone-Acetaminophen Swelling       Patient Active Problem List   Diagnosis     Hyperlipidemia     Osteoarthritis     Fuchs' endothelial dystrophy     Oral herpes       No past medical history on file.    No past surgical history on file.    Social History     Socioeconomic History     Marital status:      Spouse name: Not on file     Number of children: Not on file     Years of education: Not on file     Highest education level: Not on file   Social Needs     Financial resource strain: Not on file     Food insecurity - worry: Not on file     Food insecurity - inability: Not on file     Transportation needs - medical: Not on file     Transportation needs - non-medical: Not on file   Occupational History     Not on file   Tobacco Use     Smoking status: Former Smoker     Last attempt to quit: 2010     Years since quittin.8     Smokeless tobacco: Never Used   Substance and Sexual Activity     Alcohol use: Yes     Alcohol/week: 1.2 oz     Types: 2 Shots of liquor per week     Drug use: No     Sexual activity: Not on file   Other Topics Concern     Not on file   Social History Narrative     Not on file       Patient Care Team:  Geovanna Luna CNP as PCP - General (Nurse Practitioner)          Objective:     Vitals:    18 1348   BP: 116/80   Pulse: 71   Temp: 99  F (37.2  C)   TempSrc: Oral   SpO2: 98%   Weight: 155 lb (70.3 kg)   Height: 4' 11.25\" (1.505 m)         Physical Exam:  GENERAL: " Alert, well appearing  PSYCH: Pleasant mood, affect appropriate.   SKIN: No atypical lesions  EYES: Conjunctiva pink, sclera white, no exudates. CHUCK.  EOMs intact. Corneal light reflex bilaterally, red reflex present.Undilated fudoscopic exam normal  EARS: TMs pearly grey, no bulging, redness, retraction.  MOUTH: Pharynx moist, pink without exudate. No tonsillar enlargement  NECK: No lymphadenopathy. Thyroid borders smooth without enlargement, nodules.   CV: Regular rate and rhythm without murmurs, rubs or gallops.  RESP: Lung sounds clear  ABDOMEN: BS+. Abdomen soft, nontender to palpation without guarding. No organomegaly  PV : No edema  NEURO: Alert, oriented     Patient Instructions     Hold all supplements, aspirin and NSAIDs for 7 days prior to surgery.  Hold all supplements, aspirin and NSAIDs for 7 days prior to surgery.  Follow your surgeon's direction on when to stop eating and drinking prior to surgery.  Your surgeon will be managing your pain after your surgery.    Remove all jewelry and metal piercings before your surgery.   Remove nail polish from fingers before surgery.      EKG:  Sinus bradycardia    Labs:  Recent Results (from the past 24 hour(s))   Electrocardiogram Perform - Clinic    Collection Time: 12/18/18  2:32 PM   Result Value Ref Range    SYSTOLIC BLOOD PRESSURE  mmHg    DIASTOLIC BLOOD PRESSURE  mmHg    VENTRICULAR RATE 59 BPM    ATRIAL RATE 59 BPM    P-R INTERVAL 150 ms    QRS DURATION 86 ms    Q-T INTERVAL 422 ms    QTC CALCULATION (BEZET) 417 ms    P Axis 39 degrees    R AXIS 18 degrees    T AXIS 48 degrees    MUSE DIAGNOSIS       Sinus bradycardia  Otherwise normal ECG  No previous ECGs available     HM2(CBC w/o Differential)    Collection Time: 12/18/18  2:38 PM   Result Value Ref Range    WBC 8.3 4.0 - 11.0 thou/uL    RBC 4.33 3.80 - 5.40 mill/uL    Hemoglobin 12.5 12.0 - 16.0 g/dL    Hematocrit 37.9 35.0 - 47.0 %    MCV 88 80 - 100 fL    MCH 28.9 27.0 - 34.0 pg    MCHC 33.0 32.0  - 36.0 g/dL    RDW 11.6 11.0 - 14.5 %    Platelets 406 140 - 440 thou/uL    MPV 6.7 (L) 7.0 - 10.0 fL       Immunization History   Administered Date(s) Administered     Influenza high dose, seasonal 09/20/2018           Electronically signed by Geovanna Luna CNP 12/18/18 1:45 PM

## 2021-06-28 NOTE — PROGRESS NOTES
"Progress Notes by Lexis Suarez AuD at 10/4/2019  8:30 AM     Author: Lexis Suarez AuD Service: -- Author Type: Audiologist    Filed: 10/4/2019  9:18 AM Encounter Date: 10/4/2019 Status: Signed    : Lexis Suarez AuD (Audiologist)       Audiology only; referred by Geovanna Luna    Summary:  Audiology visit completed. Please see audiogram below or under \"media\" tab for history and results.    Transducer:  Both insert phones and circumaural headphones were used.    Reliability:    Good    Recommendations:  Follow-up with PCP; retest hearing per medical management or patient concern.  Wear hearing protection consistently in noise to preserve current hearing sensitivity.      Mary Olguin, [facility]  [place] Licensed Audiologist [id number]           "

## 2021-07-03 NOTE — ADDENDUM NOTE
Addendum Note by Marj Galindo CNP at 11/25/2020 11:42 AM     Author: Marj Galindo CNP Service: -- Author Type: Nurse Practitioner    Filed: 11/25/2020 11:42 AM Encounter Date: 11/5/2020 Status: Signed    : Marj Galindo CNP (Nurse Practitioner)    Addended by: MARJ GALINDO on: 11/25/2020 11:42 AM        Modules accepted: Orders

## 2021-07-12 ENCOUNTER — TRANSFERRED RECORDS (OUTPATIENT)
Dept: HEALTH INFORMATION MANAGEMENT | Facility: CLINIC | Age: 71
End: 2021-07-12

## 2021-08-10 DIAGNOSIS — B00.9 HSV (HERPES SIMPLEX VIRUS) INFECTION: Primary | ICD-10-CM

## 2021-08-12 RX ORDER — VALACYCLOVIR HYDROCHLORIDE 1 G/1
TABLET, FILM COATED ORAL
Qty: 20 TABLET | Refills: 1 | Status: SHIPPED | OUTPATIENT
Start: 2021-08-12 | End: 2023-05-19

## 2021-08-12 NOTE — TELEPHONE ENCOUNTER
"valACYclovir (VALTREX) 1000 MG tablet     --   Sig - Route: Take 2,000 mg by mouth 2 (two) times a day as needed. - Oral   Class: Historical Med   valACYclovir (VALTREX) 1000 MG tablet [[id number]]  Patient-reported historical medication  Ordering date: 10/05/20 0612 Authorized by: PROVIDER, HISTORICAL   Frequency: BID PRN  - Until Discontinued       Routing refill request to provider for review/approval because:  Medication is reported/historical    Last Written Prescription Date:  ???  Last Fill Quantity: ???,  # refills: ???   Last office visit provider:  2/22/21     Requested Prescriptions   Pending Prescriptions Disp Refills     valACYclovir (VALTREX) 1000 mg tablet 20 tablet 1     Sig: Take 2 tablets (2,000 mg) by mouth 2 times daily as needed (cold sores)       Antivirals for Herpes Protocol Passed - 8/10/2021 10:42 AM        Passed - Patient is age 12 or older        Passed - Recent (12 mo) or future (30 days) visit within the authorizing provider's specialty     Patient has had an office visit with the authorizing provider or a provider within the authorizing providers department within the previous 12 mos or has a future within next 30 days. See \"Patient Info\" tab in inbasket, or \"Choose Columns\" in Meds & Orders section of the refill encounter.              Passed - Medication is active on med list        Passed - Normal serum creatinine on file in past 12 months     Recent Labs   Lab Test 01/13/21  1416   CR 0.73       Ok to refill medication if creatinine is low               Benedicto Espinosa RN 08/12/21 8:30 AM  "

## 2021-10-21 ENCOUNTER — NURSE TRIAGE (OUTPATIENT)
Dept: NURSING | Facility: CLINIC | Age: 71
End: 2021-10-21

## 2021-10-21 NOTE — TELEPHONE ENCOUNTER
Patient calling for appointment info about her upcoming cardiology appointment.    Gave her info needed to get there.  Address and time/date.  She needs to call her insurance about coverage and if in network    Rosio Ortega RN  [facility] Nurse Advisor

## 2021-10-25 ENCOUNTER — TRANSFERRED RECORDS (OUTPATIENT)
Dept: HEALTH INFORMATION MANAGEMENT | Facility: CLINIC | Age: 71
End: 2021-10-25
Payer: COMMERCIAL

## 2021-10-29 ENCOUNTER — OFFICE VISIT (OUTPATIENT)
Dept: CARDIOLOGY | Facility: CLINIC | Age: 71
End: 2021-10-29
Payer: COMMERCIAL

## 2021-10-29 VITALS
SYSTOLIC BLOOD PRESSURE: 148 MMHG | WEIGHT: 155.8 LBS | HEIGHT: 60 IN | DIASTOLIC BLOOD PRESSURE: 70 MMHG | RESPIRATION RATE: 20 BRPM | BODY MASS INDEX: 30.59 KG/M2 | HEART RATE: 74 BPM

## 2021-10-29 DIAGNOSIS — I35.0 NONRHEUMATIC AORTIC VALVE STENOSIS: ICD-10-CM

## 2021-10-29 PROCEDURE — 99214 OFFICE O/P EST MOD 30 MIN: CPT | Performed by: INTERNAL MEDICINE

## 2021-10-29 RX ORDER — ALENDRONATE SODIUM 70 MG/1
70 TABLET ORAL
COMMUNITY
Start: 2021-02-22 | End: 2022-01-18

## 2021-10-29 ASSESSMENT — MIFFLIN-ST. JEOR: SCORE: 1140.26

## 2021-10-29 NOTE — LETTER
10/29/2021    Geovanna Luna, CNP  [address]  [place] [address]    RE: Pamela Engle       Dear Colleague,    I had the pleasure of seeing Pamela Engle in the [facility].    Cardiology Clinic Office Note    Assessment / Plan:    1.  Aortic stenosis, mild to moderate by echo 2 years ago.  Await repeat echo but based on her symptoms it is very unlikely that significant progression has occurred.  Would favor repeat echocardiogram in 2 years unless significant progression has noted on current study.  Encouraged to continue her moderate exercise regimen.  2.  Hypertension.  Blood pressure 148/74, 140/70 today.  She declines initiation of low-dose lisinopril today but this may be well-tolerated if her blood pressure continues to be elevated after initiating a decrease in sodium intake.    Follow-up in 2 years after follow-up echo completed.    ______________________________________________________________________    Subjective:    I had the opportunity to see Pamela Engle at the [facility]. Pamela Engle is a 71 year old female with a history of hyperlipidemia who was noted to have a heart murmur on examination a couple of years ago.  Echocardiogram showed mild to moderate aortic stenosis.  A regular, moderate exercise regimen was recommended at that time along with serial echocardiographic follow-up.  She returns today for routine follow-up.    Over the last couple of years, she has become more active.  She now plays pickle ball, does [employer] activities, and plays golf.  She does break a sweat playing pickle ball and feels that her stamina is stable.  She has had no chest pains or palpitations.  She has had no lightheadedness or dizziness.  She feels that her activities are significantly improved over where she was before.  When her blood pressure has been checked it has been well  controlled.  She does not check it on her own.  She otherwise feels well and offers no complaints today.  Her weight is stable compared to 2 years ago  Has not had a follow-up echo completed.    ______________________________________________________________________    Problem List:  Patient Active Problem List   Diagnosis     Chest mass     Medical History:  Past Medical History:   Diagnosis Date     Anemia     after surgery     Aortic stenosis     cardiology visit follow up due Fall of      Arthritis      Complication of anesthesia      Gastroesophageal reflux disease     rarely     Hepatitis     hepatitis A as a child     History of anesthesia complications     Tremors after surgery      Primary insomnia 10/5/2020     Surgical History:  Past Surgical History:   Procedure Laterality Date     ARTHROSCOPY KNEE Left      ARTHROSCOPY KNEE Right      BIOPSY  2001    bone marrow donator     BUNIONECTOMY PORTER Bilateral 's    and hammer toe surgery     ENT SURGERY  's     EXCISE MASS TRUNK N/A 2019    Procedure: EXCISION SUBCUTANEOUS MASS UPPER CHEST MIDLINE;  Surgeon: Karla Vasquez MD;  Location: [facility] OR     EXCISE CASTANO'S NEUROMA FOOT  2007 and  redo 2009     GYN SURGERY  1984    tubal ligation     ORTHOPEDIC SURGERY Right 2019    rotator cuff repair     SOFT TISSUE SURGERY      lipoma removed from back     Social History:  Social History     Socioeconomic History     Marital status:      Spouse name: Not on file     Number of children: Not on file     Years of education: Not on file     Highest education level: Not on file   Occupational History     Not on file   Tobacco Use     Smoking status: Former Smoker     Packs/day: 1.00     Types: Cigarettes, Cigarettes, Cigarettes     Start date: 1969     Quit date: 2010     Years since quittin.7     Smokeless tobacco: Never Used   Substance and Sexual Activity     Alcohol use: Yes     Alcohol/week: 2.0 standard  drinks     Comment: 1-2 drinks per week     Drug use: No     Sexual activity: Not on file   Other Topics Concern     Not on file   Social History Narrative     Not on file     Social Determinants of Health     Financial Resource Strain:      Difficulty of Paying Living Expenses:    Food Insecurity:      Worried About Running Out of Food in the Last Year:      Ran Out of Food in the Last Year:    Transportation Needs:      Lack of Transportation (Medical):      Lack of Transportation (Non-Medical):    Physical Activity:      Days of Exercise per Week:      Minutes of Exercise per Session:    Stress:      Feeling of Stress :    Social Connections:      Frequency of Communication with Friends and Family:      Frequency of Social Gatherings with Friends and Family:      Attends Jain Services:      Active Member of Clubs or Organizations:      Attends Club or Organization Meetings:      Marital Status:    Intimate Partner Violence:      Fear of Current or Ex-Partner:      Emotionally Abused:      Physically Abused:      Sexually Abused:      Sleep History:  Restorative  Exercise History:  Plays golf, pickleball breaking a sweat, and does Silver sneakers exercises.    Review of Systems:        12 point review of systems otherwise negative        Family History:  Family History   Problem Relation Age of Onset     CABG Mother 77.00     Arthritis Mother      Kidney Disease Mother      Colon Cancer Father      Hypertension Father      Hyperlipidemia Father      Prostate Cancer Father      Dementia Father      CABG Sister 58.00     Diabetes Sister      Liver Cancer Maternal Grandmother      Lung Cancer Paternal Grandfather      Substance Abuse Brother      Breast Cancer Paternal Grandmother      Coronary Stenting Sister 63.00        2 stents         Allergies:  Allergies   Allergen Reactions     Hydrocodone Swelling     Medications:  Current Outpatient Medications   Medication Sig Dispense Refill     acetaminophen  (TYLENOL) 500 MG tablet Take 2 tablets (1,000 mg) by mouth every 6 hours as needed for pain 50 tablet 0     Cholecalciferol (VITAMIN D3) 50 MCG (2000 UT) CAPS Take by mouth daily       doxylamine (UNISOM) 25 MG TABS tablet Take 25 mg by mouth nightly as needed       ibuprofen (ADVIL/MOTRIN) 200 MG tablet Take 3 tablets (600 mg) by mouth every 6 hours as needed for pain 50 tablet 0     Multiple Vitamins-Minerals (HAIR SKIN AND NAILS FORMULA) TABS Take by mouth daily       Multiple Vitamins-Minerals (MULTIVITAMIN PO) Take by mouth daily       rosuvastatin (CRESTOR) 10 MG tablet Take 10 mg by mouth every other day        valACYclovir (VALTREX) 1000 mg tablet Take two tablets by mouth twice a day for one day.  As needed for cold sores 20 tablet 1       Objective:   Wt Readings from Last 3 Encounters:   01/13/21 69.9 kg (154 lb)   10/07/20 68.5 kg (151 lb 2 oz)   09/17/20 69.6 kg (153 lb 7 oz)     Vital signs:  There were no vitals taken for this visit.      Physical Exam:    General Appearance : Awake, Alert, No acute distress  HEENT: No Scleral icterus; the mucous membranes were pink and moist.  Conjunctivae not injected  Neck:  No cervical bruits, jugular venous distention, or thyromegaly   Chest: The spine was straight. Chest wall symmetric  Lungs: Respirations unlabored; the lungs are clear to auscultation.  No wheezing   Cardiovascular:   Normal point of maximal impulse.  Auscultation reveals normal first and second heart sounds with 3/6 harsh systolic murmur radiating throughout the precordium and into the neck.  No gallop.  Carotid, radial, and dorsalis pedal pulses are intact and symmetric.  Abdomen: Rounded.  No organomegaly, masses, bruits, or tenderness. Bowels sounds are present  Extremities:  No clubbing, cyanosis.  No edema  Skin: No rash, bruising  Musculoskeletal: No tenderness.  Neurologic: Alert and oriented ×3. Speech is fluent.    Lab Results:  LIPIDS:  Lab Results   Component Value Date    CHOL 205  (H) 09/11/2019     Lab Results   Component Value Date    HDL 63 09/11/2019     Lab Results   Component Value Date    LDL 99 09/11/2019     Lab Results   Component Value Date    TRIG 217 (H) 09/11/2019       Echocardiogram 9/17/2019:    No previous study for comparison.    Sinus bradycardia heart rate in the 40s and 50s during the examination    Normal left ventricular size.    Left ventricle ejection fraction is normal. Ejection fraction estimated 60 to 65%.    Normal right ventricular size and systolic function.    Mild to moderate aortic stenosis. Mean gradient of 13 mmHg. Cath related valve area 0.9 cm .    Mild tricuspid insufficiency. Estimate of RV systolic pressure 17 mmHg plus right atrial pressure    Cannot exclude small patent foramen ovale              This note created using Dragon voice recognition software.  Sound alike errors may have escaped editing.

## 2021-10-29 NOTE — PATIENT INSTRUCTIONS
1. We will check an echocardiogram to look for progression in your aortic valve disease.  2. Work on limiting your sodium intake, exercising 150 minutes of moderate activities each week, to help with blood pressure control.  3. We will plan to repeat an echocardiogram in 2 years time, and meet in follow-up after that procedure is completed

## 2021-10-29 NOTE — PROGRESS NOTES
Cardiology Clinic Office Note    Assessment / Plan:    1.  Aortic stenosis, mild to moderate by echo 2 years ago.  Await repeat echo but based on her symptoms it is very unlikely that significant progression has occurred.  Would favor repeat echocardiogram in 2 years unless significant progression has noted on current study.  Encouraged to continue her moderate exercise regimen.  2.  Hypertension.  Blood pressure 148/74, 140/70 today.  She declines initiation of low-dose lisinopril today but this may be well-tolerated if her blood pressure continues to be elevated after initiating a decrease in sodium intake.    Follow-up in 2 years after follow-up echo completed.    ______________________________________________________________________    Subjective:    I had the opportunity to see Pamela Engle at the [facility]. Pamela Engle is a 71 year old female with a history of hyperlipidemia who was noted to have a heart murmur on examination a couple of years ago.  Echocardiogram showed mild to moderate aortic stenosis.  A regular, moderate exercise regimen was recommended at that time along with serial echocardiographic follow-up.  She returns today for routine follow-up.    Over the last couple of years, she has become more active.  She now plays pickle ball, does [employer] activities, and plays golf.  She does break a sweat playing pickle ball and feels that her stamina is stable.  She has had no chest pains or palpitations.  She has had no lightheadedness or dizziness.  She feels that her activities are significantly improved over where she was before.  When her blood pressure has been checked it has been well controlled.  She does not check it on her own.  She otherwise feels well and offers no complaints today.  Her weight is stable compared to 2 years ago  Has not had a follow-up echo  completed.    ______________________________________________________________________    Problem List:  Patient Active Problem List   Diagnosis     Chest mass     Medical History:  Past Medical History:   Diagnosis Date     Anemia     after surgery     Aortic stenosis     cardiology visit follow up due Fall 2021     Arthritis      Complication of anesthesia      Gastroesophageal reflux disease     rarely     Hepatitis     hepatitis A as a child     History of anesthesia complications     Tremors after surgery      Primary insomnia 10/5/2020     Surgical History:  Past Surgical History:   Procedure Laterality Date     ARTHROSCOPY KNEE Left      ARTHROSCOPY KNEE Right      BIOPSY  2001    bone marrow donator     BUNIONECTOMY PORTER Bilateral     and hammer toe surgery     ENT SURGERY       EXCISE MASS TRUNK N/A 2019    Procedure: EXCISION SUBCUTANEOUS MASS UPPER CHEST MIDLINE;  Surgeon: Karla Vasquez MD;  Location: [facility] OR     EXCISE CASTANO'S NEUROMA FOOT   and  redo 2009     GYN SURGERY  1984    tubal ligation     ORTHOPEDIC SURGERY Right 2019    rotator cuff repair     SOFT TISSUE SURGERY      lipoma removed from back     Social History:  Social History     Socioeconomic History     Marital status:      Spouse name: Not on file     Number of children: Not on file     Years of education: Not on file     Highest education level: Not on file   Occupational History     Not on file   Tobacco Use     Smoking status: Former Smoker     Packs/day: 1.00     Types: Cigarettes, Cigarettes, Cigarettes     Start date: 1969     Quit date: 2010     Years since quittin.7     Smokeless tobacco: Never Used   Substance and Sexual Activity     Alcohol use: Yes     Alcohol/week: 2.0 standard drinks     Comment: 1-2 drinks per week     Drug use: No     Sexual activity: Not on file   Other Topics Concern     Not on file   Social History Narrative     Not on file     Social  Determinants of Health     Financial Resource Strain:      Difficulty of Paying Living Expenses:    Food Insecurity:      Worried About Running Out of Food in the Last Year:      Ran Out of Food in the Last Year:    Transportation Needs:      Lack of Transportation (Medical):      Lack of Transportation (Non-Medical):    Physical Activity:      Days of Exercise per Week:      Minutes of Exercise per Session:    Stress:      Feeling of Stress :    Social Connections:      Frequency of Communication with Friends and Family:      Frequency of Social Gatherings with Friends and Family:      Attends Church Services:      Active Member of Clubs or Organizations:      Attends Club or Organization Meetings:      Marital Status:    Intimate Partner Violence:      Fear of Current or Ex-Partner:      Emotionally Abused:      Physically Abused:      Sexually Abused:      Sleep History:  Restorative  Exercise History:  Plays golf, pickleball breaking a sweat, and does Silver sneakers exercises.    Review of Systems:        12 point review of systems otherwise negative        Family History:  Family History   Problem Relation Age of Onset     CABG Mother 77.00     Arthritis Mother      Kidney Disease Mother      Colon Cancer Father      Hypertension Father      Hyperlipidemia Father      Prostate Cancer Father      Dementia Father      CABG Sister 58.00     Diabetes Sister      Liver Cancer Maternal Grandmother      Lung Cancer Paternal Grandfather      Substance Abuse Brother      Breast Cancer Paternal Grandmother      Coronary Stenting Sister 63.00        2 stents         Allergies:  Allergies   Allergen Reactions     Hydrocodone Swelling     Medications:  Current Outpatient Medications   Medication Sig Dispense Refill     acetaminophen (TYLENOL) 500 MG tablet Take 2 tablets (1,000 mg) by mouth every 6 hours as needed for pain 50 tablet 0     Cholecalciferol (VITAMIN D3) 50 MCG (2000 UT) CAPS Take by mouth daily        doxylamine (UNISOM) 25 MG TABS tablet Take 25 mg by mouth nightly as needed       ibuprofen (ADVIL/MOTRIN) 200 MG tablet Take 3 tablets (600 mg) by mouth every 6 hours as needed for pain 50 tablet 0     Multiple Vitamins-Minerals (HAIR SKIN AND NAILS FORMULA) TABS Take by mouth daily       Multiple Vitamins-Minerals (MULTIVITAMIN PO) Take by mouth daily       rosuvastatin (CRESTOR) 10 MG tablet Take 10 mg by mouth every other day        valACYclovir (VALTREX) 1000 mg tablet Take two tablets by mouth twice a day for one day.  As needed for cold sores 20 tablet 1       Objective:   Wt Readings from Last 3 Encounters:   01/13/21 69.9 kg (154 lb)   10/07/20 68.5 kg (151 lb 2 oz)   09/17/20 69.6 kg (153 lb 7 oz)     Vital signs:  There were no vitals taken for this visit.      Physical Exam:    General Appearance : Awake, Alert, No acute distress  HEENT: No Scleral icterus; the mucous membranes were pink and moist.  Conjunctivae not injected  Neck:  No cervical bruits, jugular venous distention, or thyromegaly   Chest: The spine was straight. Chest wall symmetric  Lungs: Respirations unlabored; the lungs are clear to auscultation.  No wheezing   Cardiovascular:   Normal point of maximal impulse.  Auscultation reveals normal first and second heart sounds with 3/6 harsh systolic murmur radiating throughout the precordium and into the neck.  No gallop.  Carotid, radial, and dorsalis pedal pulses are intact and symmetric.  Abdomen: Rounded.  No organomegaly, masses, bruits, or tenderness. Bowels sounds are present  Extremities:  No clubbing, cyanosis.  No edema  Skin: No rash, bruising  Musculoskeletal: No tenderness.  Neurologic: Alert and oriented ×3. Speech is fluent.    Lab Results:  LIPIDS:  Lab Results   Component Value Date    CHOL 205 (H) 09/11/2019     Lab Results   Component Value Date    HDL 63 09/11/2019     Lab Results   Component Value Date    LDL 99 09/11/2019     Lab Results   Component Value Date    TRIG 217  (H) 09/11/2019       Echocardiogram 9/17/2019:    No previous study for comparison.    Sinus bradycardia heart rate in the 40s and 50s during the examination    Normal left ventricular size.    Left ventricle ejection fraction is normal. Ejection fraction estimated 60 to 65%.    Normal right ventricular size and systolic function.    Mild to moderate aortic stenosis. Mean gradient of 13 mmHg. Cath related valve area 0.9 cm .    Mild tricuspid insufficiency. Estimate of RV systolic pressure 17 mmHg plus right atrial pressure    Cannot exclude small patent foramen ovale              ARACELI SINCLAIR MD [facility]  [phone]    This note created using Dragon voice recognition software.  Sound alike errors may have escaped editing.

## 2021-11-02 ENCOUNTER — HOSPITAL ENCOUNTER (OUTPATIENT)
Dept: CARDIOLOGY | Facility: CLINIC | Age: 71
Discharge: HOME | End: 2021-11-02
Attending: INTERNAL MEDICINE | Admitting: INTERNAL MEDICINE
Payer: COMMERCIAL

## 2021-11-02 DIAGNOSIS — I35.0 NONRHEUMATIC AORTIC VALVE STENOSIS: ICD-10-CM

## 2021-11-02 LAB — LVEF ECHO: NORMAL

## 2021-11-02 PROCEDURE — 93306 TTE W/DOPPLER COMPLETE: CPT | Mod: 26 | Performed by: INTERNAL MEDICINE

## 2021-11-02 PROCEDURE — 93306 TTE W/DOPPLER COMPLETE: CPT

## 2022-01-13 DIAGNOSIS — Q78.2 OSTEOPETROSIS: ICD-10-CM

## 2022-01-16 NOTE — TELEPHONE ENCOUNTER
"Routing refill request to provider for review/approval because:  Medication is reported/historical      Last office visit provider:  2/22/21     Requested Prescriptions   Pending Prescriptions Disp Refills     alendronate (FOSAMAX) 70 MG tablet [Pharmacy Med Name: ALENDRONATE SODIUM 70 MG TAB] 12 tablet 3     Sig: TAKE 1 TABLET (70 MG TOTAL) BY MOUTH EVERY 7 DAYS.       Bisphosphonates Passed - 1/13/2022 12:12 AM        Passed - Recent (12 mo) or future (30 days) visit within the authorizing provider's specialty     Patient has had an office visit with the authorizing provider or a provider within the authorizing providers department within the previous 12 mos or has a future within next 30 days. See \"Patient Info\" tab in inbasket, or \"Choose Columns\" in Meds & Orders section of the refill encounter.              Passed - Dexa on file within past 2 years     Please review last Dexa result.           Passed - Medication is active on med list        Passed - Patient is age 18 or older        Passed - Normal serum creatinine on file within past 12 months     Recent Labs   Lab Test 01/13/21  1416   CR 0.73       Ok to refill medication if creatinine is low               eli garcía RN 01/16/22 7:50 AM  "

## 2022-01-18 RX ORDER — ALENDRONATE SODIUM 70 MG/1
TABLET ORAL
Qty: 12 TABLET | Refills: 3 | Status: SHIPPED | OUTPATIENT
Start: 2022-01-18 | End: 2022-02-09

## 2022-02-06 ASSESSMENT — ACTIVITIES OF DAILY LIVING (ADL): CURRENT_FUNCTION: NO ASSISTANCE NEEDED

## 2022-02-09 ENCOUNTER — OFFICE VISIT (OUTPATIENT)
Dept: FAMILY MEDICINE | Facility: CLINIC | Age: 72
End: 2022-02-09
Payer: COMMERCIAL

## 2022-02-09 VITALS
BODY MASS INDEX: 30.64 KG/M2 | HEIGHT: 59 IN | SYSTOLIC BLOOD PRESSURE: 126 MMHG | DIASTOLIC BLOOD PRESSURE: 68 MMHG | OXYGEN SATURATION: 97 % | WEIGHT: 152 LBS | HEART RATE: 75 BPM

## 2022-02-09 DIAGNOSIS — Z87.891 PERSONAL HISTORY OF TOBACCO USE, PRESENTING HAZARDS TO HEALTH: ICD-10-CM

## 2022-02-09 DIAGNOSIS — Q78.2 OSTEOPETROSIS: ICD-10-CM

## 2022-02-09 DIAGNOSIS — Z00.00 ENCOUNTER FOR MEDICARE ANNUAL WELLNESS EXAM: Primary | ICD-10-CM

## 2022-02-09 DIAGNOSIS — Z78.0 ASYMPTOMATIC MENOPAUSAL STATE: ICD-10-CM

## 2022-02-09 DIAGNOSIS — E78.00 PURE HYPERCHOLESTEROLEMIA: ICD-10-CM

## 2022-02-09 DIAGNOSIS — R73.03 PRE-DIABETES: ICD-10-CM

## 2022-02-09 PROBLEM — Z98.1 S/P LUMBAR FUSION: Status: ACTIVE | Noted: 2020-10-07

## 2022-02-09 PROBLEM — H18.519 FUCHS' ENDOTHELIAL DYSTROPHY: Status: ACTIVE | Noted: 2018-09-20

## 2022-02-09 PROBLEM — E78.5 HYPERLIPIDEMIA: Status: ACTIVE | Noted: 2018-09-20

## 2022-02-09 PROBLEM — B00.2 ORAL HERPES: Status: ACTIVE | Noted: 2018-09-20

## 2022-02-09 PROBLEM — M19.90 OSTEOARTHROSIS: Status: ACTIVE | Noted: 2018-09-20

## 2022-02-09 LAB
ALBUMIN SERPL-MCNC: 4.2 G/DL (ref 3.5–5)
ALP SERPL-CCNC: 70 U/L (ref 45–120)
ALT SERPL W P-5'-P-CCNC: 29 U/L (ref 0–45)
ANION GAP SERPL CALCULATED.3IONS-SCNC: 12 MMOL/L (ref 5–18)
AST SERPL W P-5'-P-CCNC: 23 U/L (ref 0–40)
BILIRUB SERPL-MCNC: 0.4 MG/DL (ref 0–1)
BUN SERPL-MCNC: 16 MG/DL (ref 8–28)
CALCIUM SERPL-MCNC: 10 MG/DL (ref 8.5–10.5)
CHLORIDE BLD-SCNC: 107 MMOL/L (ref 98–107)
CO2 SERPL-SCNC: 23 MMOL/L (ref 22–31)
CREAT SERPL-MCNC: 0.72 MG/DL (ref 0.6–1.1)
GFR SERPL CREATININE-BSD FRML MDRD: 89 ML/MIN/1.73M2
GLUCOSE BLD-MCNC: 101 MG/DL (ref 70–125)
HBA1C MFR BLD: 5.6 % (ref 0–5.6)
POTASSIUM BLD-SCNC: 4.1 MMOL/L (ref 3.5–5)
PROT SERPL-MCNC: 7.3 G/DL (ref 6–8)
SODIUM SERPL-SCNC: 142 MMOL/L (ref 136–145)

## 2022-02-09 PROCEDURE — 80053 COMPREHEN METABOLIC PANEL: CPT | Performed by: NURSE PRACTITIONER

## 2022-02-09 PROCEDURE — 36415 COLL VENOUS BLD VENIPUNCTURE: CPT | Performed by: NURSE PRACTITIONER

## 2022-02-09 PROCEDURE — 99397 PER PM REEVAL EST PAT 65+ YR: CPT | Mod: 25 | Performed by: NURSE PRACTITIONER

## 2022-02-09 PROCEDURE — 83036 HEMOGLOBIN GLYCOSYLATED A1C: CPT | Performed by: NURSE PRACTITIONER

## 2022-02-09 RX ORDER — ALENDRONATE SODIUM 70 MG/1
TABLET ORAL
Qty: 12 TABLET | Refills: 3 | Status: SHIPPED | OUTPATIENT
Start: 2022-02-09 | End: 2022-12-29

## 2022-02-09 RX ORDER — ROSUVASTATIN CALCIUM 10 MG/1
10 TABLET, COATED ORAL EVERY OTHER DAY
Qty: 90 TABLET | Refills: 3 | Status: SHIPPED | OUTPATIENT
Start: 2022-02-09 | End: 2022-02-16

## 2022-02-09 ASSESSMENT — MIFFLIN-ST. JEOR: SCORE: 1115.1

## 2022-02-09 ASSESSMENT — ACTIVITIES OF DAILY LIVING (ADL): CURRENT_FUNCTION: NO ASSISTANCE NEEDED

## 2022-02-09 NOTE — PROGRESS NOTES
"SUBJECTIVE:   Pamela Engle is a 71 year old female who presents for Preventive Visit.    She has been well. Trying to focus on exercising more.  Has been doing Pickle Ball 3-4 times a week and Silver Sneakers at home.      BP elevated at recent cardiology visit though normotensive today.  Recent echocardiogram for monitoring aortic valve stenosis.  Slight progression, moderate. Asymptomatic.     Lung cancer screenin year pack history.     Nose 'whistiling' on left side occasionally. Mild congestion comes and goes. No difficulty breathing. Not bothersome but has noted     HM:  Mammogram up to date.  Colonoscopy up to date. Due DEXA    Patient has been advised of split billing requirements and indicates understanding: Yes  Are you in the first 12 months of your Medicare coverage?  No    Healthy Habits:     In general, how would you rate your overall health?  Good    Frequency of exercise:  2-3 days/week    Duration of exercise:  15-30 minutes    Do you usually eat at least 4 servings of fruit and vegetables a day, include whole grains    & fiber and avoid regularly eating high fat or \"junk\" foods?  No    Taking medications regularly:  Yes    Medication side effects:  Not applicable    Ability to successfully perform activities of daily living:  No assistance needed    Home Safety:  No safety concerns identified    Hearing Impairment:  Difficulty following a conversation in a noisy restaurant or crowded room, feel that people are mumbling or not speaking clearly and need to ask people to speak up or repeat themselves    In the past 6 months, have you been bothered by leaking of urine?  No    In general, how would you rate your overall mental or emotional health?  Excellent      PHQ-2 Total Score: 0    Additional concerns today:  Yes    Do you feel safe in your environment? Yes    Have you ever done Advance Care Planning? (For example, a Health Directive, POLST, or a discussion with a medical provider or your " loved ones about your wishes): Yes, patient states has an Advance Care Planning document and will bring a copy to the clinic.       Fall risk  Fallen 2 or more times in the past year?: (P) No  Any fall with injury in the past year?: (P) No    Cognitive Screening   1) Repeat 3 items (Leader, Season, Table)    2) Clock draw: NORMAL  3) 3 item recall: Recalls 3 objects  Results: 3 items recalled: COGNITIVE IMPAIRMENT LESS LIKELY    Mini-CogTM Copyright CINDY Bueno. Licensed by the author for use in [facility]; reprinted with permission (rj@[facility]). All rights reserved.      Do you have sleep apnea, excessive snoring or daytime drowsiness?: no    Reviewed and updated as needed this visit by clinical staff  Tobacco  Allergies              Reviewed and updated as needed this visit by Provider               Social History     Tobacco Use     Smoking status: Former Smoker     Packs/day: 1.00     Types: Cigarettes, Cigarettes, Cigarettes     Start date: 1969     Quit date: 2010     Years since quittin.0     Smokeless tobacco: Never Used   Substance Use Topics     Alcohol use: Yes     Alcohol/week: 2.0 standard drinks     Comment: 1-2 drinks per week         Alcohol Use 2022   Prescreen: >3 drinks/day or >7 drinks/week? No               Current providers sharing in care for this patient include:   Patient Care Team:  Geovanna Luna CNP as PCP - General (Pediatrics)  Geovanna Luna CNP as Assigned PCP  Yeison Hernandez MD as Assigned Heart and Vascular Provider    The following health maintenance items are reviewed in Epic and correct as of today:  Health Maintenance Due   Topic Date Due     ANNUAL REVIEW OF HM ORDERS  Never done     HEPATITIS C SCREENING  Never done     LUNG CANCER SCREENING  Never done     ZOSTER IMMUNIZATION (2 of 3) 2012     FALL RISK ASSESSMENT  2022     MEDICARE ANNUAL WELLNESS VISIT  2022     Lab work is in process  Mammogram Screening: Mammogram  "Screening: Recommended mammography every 1-2 years with patient discussion and risk factor consideration        Review of Systems  Constitutional, HEENT, cardiovascular, pulmonary, gi and gu systems are negative, except as otherwise noted.    OBJECTIVE:   /72   Pulse 75   Ht 1.499 m (4' 11\")   Wt 68.9 kg (152 lb)   SpO2 97%   BMI 30.70 kg/m   Estimated body mass index is 30.7 kg/m  as calculated from the following:    Height as of this encounter: 1.499 m (4' 11\").    Weight as of this encounter: 68.9 kg (152 lb).  Physical Exam  GENERAL: healthy, alert and no distress  EYES: Eyes grossly normal to inspection, PERRL and conjunctivae and sclerae normal  HENT: ear canals and TM's normal, nose and mouth without ulcers or lesions  NECK: no adenopathy, no asymmetry, masses, or scars and thyroid normal to palpation  RESP: lungs clear to auscultation - no rales, rhonchi or wheezes  BREAST: normal without masses, tenderness or nipple discharge and no palpable axillary masses or adenopathy  CV: regular rate and rhythm, normal S1 S2, no S3 or S4, no murmur, click or rub, no peripheral edema and peripheral pulses strong  ABDOMEN: soft, nontender, no hepatosplenomegaly, no masses and bowel sounds normal  MS: no gross musculoskeletal defects noted, no edema  SKIN: no suspicious lesions or rashes  NEURO: Normal strength and tone, mentation intact and speech normal  PSYCH: mentation appears normal, affect normal/bright    Diagnostic Test Results:  Labs reviewed in Epic    ASSESSMENT / PLAN:   1. Encounter for Medicare annual wellness exam  Non-fasting labs. Up to date on mammogram, CRC screening. Very active, exercising regularly.      2. Osteopetrosis  Due for repeat DEXA after starting alendronate one year ago.  Tolerating well.  Will be in touch with results and advise on continuing therapy  - DX Hip/Pelvis/Spine; Future  - alendronate (FOSAMAX) 70 MG tablet; TAKE 1 TABLET (70 MG TOTAL) BY MOUTH EVERY 7 DAYS.  " "Dispense: 12 tablet; Refill: 3    3. Pre-diabetes  - Hemoglobin A1c; Future  - Hemoglobin A1c    4. Personal history of tobacco use, presenting hazards to health  History of tobacco use, 20 year pack history. Quit 12 years ago. Advised she does not meet Medicare qualifications for annual CT based on 30 year pack history.  We did discuss annual chest xray if she is interested, she feels okay foregoing this    5. Pure hypercholesterolemia  - rosuvastatin (CRESTOR) 10 MG tablet; Take 1 tablet (10 mg) by mouth every other day  Dispense: 90 tablet; Refill: 3  - Comprehensive metabolic panel (BMP + Alb, Alk Phos, ALT, AST, Total. Bili, TP); Future  - Comprehensive metabolic panel (BMP + Alb, Alk Phos, ALT, AST, Total. Bili, TP)    6. Asymptomatic menopausal state   - DX Hip/Pelvis/Spine; Future    Patient has been advised of split billing requirements and indicates understanding: Yes    COUNSELING:  Reviewed preventive health counseling, as reflected in patient instructions    Estimated body mass index is 30.7 kg/m  as calculated from the following:    Height as of this encounter: 1.499 m (4' 11\").    Weight as of this encounter: 68.9 kg (152 lb).        She reports that she quit smoking about 12 years ago. Her smoking use included cigarettes, cigarettes, and cigarettes. She started smoking about 53 years ago. She smoked 1.00 pack per day. She has never used smokeless tobacco.      Appropriate preventive services were discussed with this patient, including applicable screening as appropriate for cardiovascular disease, diabetes, osteopenia/osteoporosis, and glaucoma.  As appropriate for age/gender, discussed screening for colorectal cancer, prostate cancer, breast cancer, and cervical cancer. Checklist reviewing preventive services available has been given to the patient.    Reviewed patients plan of care and provided an AVS. The Basic Care Plan (routine screening as documented in Health Maintenance) for  meets the " Care Plan requirement. This Care Plan has been established and reviewed with the Patient.    Counseling Resources:  ATP IV Guidelines  Pooled Cohorts Equation Calculator  Breast Cancer Risk Calculator  Breast Cancer: Medication to Reduce Risk  FRAX Risk Assessment  ICSI Preventive Guidelines  Dietary Guidelines for Americans, 2010  USDA's MyPlate  ASA Prophylaxis  Lung CA Screening    Geovanna Luna CNP  [facility]    Identified Health Risks:

## 2022-02-09 NOTE — PATIENT INSTRUCTIONS
Patient Education   Personalized Prevention Plan  You are due for the preventive services outlined below.  Your care team is available to assist you in scheduling these services.  If you have already completed any of these items, please share that information with your care team to update in your medical record.  Health Maintenance Due   Topic Date Due     ANNUAL REVIEW OF HM ORDERS  Never done     Hepatitis C Screening  Never done     LUNG CANCER SCREENING  Never done     Zoster (Shingles) Vaccine (2 of 3) 08/21/2012     FALL RISK ASSESSMENT  01/13/2022     Annual Wellness Visit  01/13/2022

## 2022-02-16 DIAGNOSIS — E78.00 PURE HYPERCHOLESTEROLEMIA: ICD-10-CM

## 2022-02-16 RX ORDER — ROSUVASTATIN CALCIUM 10 MG/1
10 TABLET, COATED ORAL AT BEDTIME
Qty: 90 TABLET | Refills: 3 | Status: SHIPPED | OUTPATIENT
Start: 2022-02-16 | End: 2022-10-03

## 2022-02-16 NOTE — TELEPHONE ENCOUNTER
Prescription for rosuvastatin do not have matching sig and quantity.  Please resend corrected prescription.

## 2022-03-08 ENCOUNTER — ANCILLARY PROCEDURE (OUTPATIENT)
Dept: BONE DENSITY | Facility: CLINIC | Age: 72
End: 2022-03-08
Attending: NURSE PRACTITIONER
Payer: COMMERCIAL

## 2022-03-08 DIAGNOSIS — Z78.0 POST-MENOPAUSAL: ICD-10-CM

## 2022-03-08 DIAGNOSIS — Z78.0 ASYMPTOMATIC MENOPAUSAL STATE: ICD-10-CM

## 2022-03-08 DIAGNOSIS — Q78.2 OSTEOPETROSIS: ICD-10-CM

## 2022-03-08 PROCEDURE — 77080 DXA BONE DENSITY AXIAL: CPT | Performed by: INTERNAL MEDICINE

## 2022-03-08 PROCEDURE — 77081 DXA BONE DENSITY APPENDICULR: CPT | Mod: 59 | Performed by: INTERNAL MEDICINE

## 2022-03-18 ENCOUNTER — TRANSFERRED RECORDS (OUTPATIENT)
Dept: HEALTH INFORMATION MANAGEMENT | Facility: CLINIC | Age: 72
End: 2022-03-18
Payer: COMMERCIAL

## 2022-06-24 ENCOUNTER — ANCILLARY PROCEDURE (OUTPATIENT)
Dept: GENERAL RADIOLOGY | Facility: CLINIC | Age: 72
End: 2022-06-24
Attending: PHYSICIAN ASSISTANT
Payer: COMMERCIAL

## 2022-06-24 ENCOUNTER — OFFICE VISIT (OUTPATIENT)
Dept: URGENT CARE | Facility: URGENT CARE | Age: 72
End: 2022-06-24

## 2022-06-24 VITALS
OXYGEN SATURATION: 98 % | HEART RATE: 78 BPM | RESPIRATION RATE: 20 BRPM | SYSTOLIC BLOOD PRESSURE: 132 MMHG | TEMPERATURE: 98 F | DIASTOLIC BLOOD PRESSURE: 67 MMHG

## 2022-06-24 DIAGNOSIS — R06.2 WHEEZING: Primary | ICD-10-CM

## 2022-06-24 DIAGNOSIS — R05.9 COUGH: ICD-10-CM

## 2022-06-24 PROCEDURE — 99214 OFFICE O/P EST MOD 30 MIN: CPT | Performed by: PHYSICIAN ASSISTANT

## 2022-06-24 PROCEDURE — 71046 X-RAY EXAM CHEST 2 VIEWS: CPT | Mod: TC | Performed by: RADIOLOGY

## 2022-06-24 RX ORDER — ALBUTEROL SULFATE 90 UG/1
2 AEROSOL, METERED RESPIRATORY (INHALATION) ONCE
Status: COMPLETED | OUTPATIENT
Start: 2022-06-24 | End: 2022-06-24

## 2022-06-24 RX ORDER — PREDNISONE 20 MG/1
20 TABLET ORAL DAILY
Qty: 5 TABLET | Refills: 0 | Status: SHIPPED | OUTPATIENT
Start: 2022-06-24 | End: 2022-06-24

## 2022-06-24 RX ORDER — PREDNISONE 20 MG/1
40 TABLET ORAL DAILY
Qty: 10 TABLET | Refills: 0 | Status: SHIPPED | OUTPATIENT
Start: 2022-06-24 | End: 2022-06-29

## 2022-06-24 RX ORDER — LORATADINE 10 MG/1
10 TABLET ORAL DAILY
Qty: 30 TABLET | Refills: 0 | Status: SHIPPED | OUTPATIENT
Start: 2022-06-24 | End: 2022-07-18

## 2022-06-24 RX ADMIN — ALBUTEROL SULFATE 2 PUFF: 90 INHALANT RESPIRATORY (INHALATION) at 15:45

## 2022-06-24 NOTE — PROGRESS NOTES
Assessment & Plan     1. Wheezing  - XR Chest 2 Views; Future  - albuterol (PROVENTIL HFA/VENTOLIN HFA) inhaler  - predniSONE (DELTASONE) 20 MG tablet; Take 2 tablets (40 mg) by mouth daily for 5 days  Dispense: 10 tablet; Refill: 0  - loratadine (CLARITIN) 10 MG tablet; Take 1 tablet (10 mg) by mouth daily  Dispense: 30 tablet; Refill: 0    2. Cough    Patient with cough and wheezing for the past one week. On exam, she appears well. VSS. She is not hypoxic. She has end expiratory wheezing on exam, rest of exam is normal. CXR is neg for infiltrate, pneumothorax etc. She was given albuterol in clinic, which helped wheezing, but did not completely clear it. Will treat with prednisone and albuterol. Possibly an allergy -- will have her add on Claritin.   I have low suspicion for pneumonia, sinusitis or other bacterial infection as she otherwise feels well, is afebrile etc.   Encouraged fluids and rest  Discussed return precautions.         Return in about 3 days (around 6/27/2022), or if symptoms worsen or fail to improve.    Diagnosis and treatment plan was reviewed with patient and/or family.   We went over any labs or imaging. Discussed worsening symptoms or little to no relief despite treatment plan to follow-up with PCP or return to clinic.  Patient verbalizes understanding. All questions were addressed and answered.     Lashawn Odell PA-C  [facility]    CHIEF COMPLAINT:   Chief Complaint   Patient presents with     URI     Cough and wheezing NEG COVID      Subjective     Danni is a 71 year old female who presents to clinic today for evaluation of cough and wheezing. Started one week ago when after she was done playing pickleball. Wheezing is worse after activity. Having some nasal drainage and congestion, mostly clear and feels like it is improving. She has never had wheezing in the past. She denies having chest pain or shortness of breath. Pleurisy or hemoptysis. Took a COVID test at  home and was negative.       Past Medical History:   Diagnosis Date     Anemia     after surgery     Aortic stenosis     cardiology visit follow up due Fall of      Arthritis      Complication of anesthesia      Gastroesophageal reflux disease     rarely     Hepatitis     hepatitis A as a child     History of anesthesia complications     Tremors after surgery      Primary insomnia 10/5/2020     Past Surgical History:   Procedure Laterality Date     ARTHROSCOPY KNEE Left      ARTHROSCOPY KNEE Right      BIOPSY  2001    bone marrow donator     BUNIONECTOMY PORTER Bilateral 's    and hammer toe surgery     ENT SURGERY  's     EXCISE MASS TRUNK N/A 2019    Procedure: EXCISION SUBCUTANEOUS MASS UPPER CHEST MIDLINE;  Surgeon: Karla Vasquez MD;  Location: [facility] OR     EXCISE CASTANO'S NEUROMA FOOT  2007 and  redo 2009     GYN SURGERY  1984    tubal ligation     ORTHOPEDIC SURGERY Right 2019    rotator cuff repair     SOFT TISSUE SURGERY      lipoma removed from back     Social History     Tobacco Use     Smoking status: Former Smoker     Packs/day: 1.00     Types: Cigarettes, Cigarettes, Cigarettes     Start date: 1969     Quit date: 2010     Years since quittin.4     Smokeless tobacco: Never Used   Substance Use Topics     Alcohol use: Yes     Alcohol/week: 2.0 standard drinks     Comment: 1-2 drinks per week     Current Outpatient Medications   Medication     acetaminophen (TYLENOL) 500 MG tablet     alendronate (FOSAMAX) 70 MG tablet     Calcium-Magnesium-Zinc (HUGO-MAG-ZINC OR)     Cholecalciferol (VITAMIN D3) 50 MCG ( UT) CAPS     ibuprofen (ADVIL/MOTRIN) 200 MG tablet     loratadine (CLARITIN) 10 MG tablet     Multiple Vitamins-Minerals (HAIR SKIN AND NAILS FORMULA) TABS     Multiple Vitamins-Minerals (MULTIVITAMIN PO)     predniSONE (DELTASONE) 20 MG tablet     rosuvastatin (CRESTOR) 10 MG tablet     valACYclovir (VALTREX) 1000 mg tablet     No current  facility-administered medications for this visit.     Allergies   Allergen Reactions     Hydrocodone Swelling       10 point ROS of systems were all negative except for pertinent positives noted in my HPI.      Exam:   /67   Pulse 78   Temp 98  F (36.7  C)   Resp 20   SpO2 98%   Constitutional: healthy, alert and no distress  Head: Normocephalic, atraumatic.  Eyes: conjunctiva clear, no drainage  ENT: TMs clear and shiny kim, nasal mucosa pink and moist, throat without tonsillar hypertrophy or erythema  Neck: neck is supple, no cervical lymphadenopathy or nuchal rigidity  Cardiovascular: RRR  Respiratory: Faint end expiratory wheeze. No rales or rhonchi.   Skin: no rashes  Neurologic: Speech clear, gait normal. Moves all extremities.    Results for orders placed or performed in visit on 06/24/22   XR Chest 2 Views     Status: None    Narrative    CHEST TWO VIEWS  6/24/2022 3:58 PM     HISTORY: 71-year-old woman with cough and wheezing.    COMPARISON: None       Impression    IMPRESSION: Heart size is normal. No pleural effusion, pneumothorax,  or abnormal area of consolidation. Small focal opacity adjacent to the  left heart border thought to be pericardial fat pad.    ZAK BAILEY MD         SYSTEM ID:  [id number]

## 2022-07-13 ENCOUNTER — TRANSFERRED RECORDS (OUTPATIENT)
Dept: HEALTH INFORMATION MANAGEMENT | Facility: CLINIC | Age: 72
End: 2022-07-13

## 2022-07-18 ENCOUNTER — OFFICE VISIT (OUTPATIENT)
Dept: INTERNAL MEDICINE | Facility: CLINIC | Age: 72
End: 2022-07-18
Payer: COMMERCIAL

## 2022-07-18 VITALS
HEIGHT: 59 IN | SYSTOLIC BLOOD PRESSURE: 120 MMHG | HEART RATE: 75 BPM | WEIGHT: 154 LBS | DIASTOLIC BLOOD PRESSURE: 76 MMHG | BODY MASS INDEX: 31.04 KG/M2 | OXYGEN SATURATION: 96 %

## 2022-07-18 DIAGNOSIS — Z13.0 SCREENING FOR ENDOCRINE, NUTRITIONAL, METABOLIC AND IMMUNITY DISORDER: ICD-10-CM

## 2022-07-18 DIAGNOSIS — R25.2 MUSCLE CRAMPING: ICD-10-CM

## 2022-07-18 DIAGNOSIS — Z13.21 SCREENING FOR ENDOCRINE, NUTRITIONAL, METABOLIC AND IMMUNITY DISORDER: ICD-10-CM

## 2022-07-18 DIAGNOSIS — R06.2 WHEEZING: ICD-10-CM

## 2022-07-18 DIAGNOSIS — I35.0 MODERATE AORTIC STENOSIS: ICD-10-CM

## 2022-07-18 DIAGNOSIS — Z76.89 ENCOUNTER TO ESTABLISH CARE: ICD-10-CM

## 2022-07-18 DIAGNOSIS — Z13.29 SCREENING FOR ENDOCRINE, NUTRITIONAL, METABOLIC AND IMMUNITY DISORDER: ICD-10-CM

## 2022-07-18 DIAGNOSIS — E78.5 HYPERLIPIDEMIA LDL GOAL <130: ICD-10-CM

## 2022-07-18 DIAGNOSIS — M81.0 AGE-RELATED OSTEOPOROSIS WITHOUT CURRENT PATHOLOGICAL FRACTURE: ICD-10-CM

## 2022-07-18 DIAGNOSIS — R05.9 COUGH: ICD-10-CM

## 2022-07-18 DIAGNOSIS — Z13.228 SCREENING FOR ENDOCRINE, NUTRITIONAL, METABOLIC AND IMMUNITY DISORDER: ICD-10-CM

## 2022-07-18 LAB
ALBUMIN SERPL BCG-MCNC: 4.7 G/DL (ref 3.5–5.2)
ALP SERPL-CCNC: 66 U/L (ref 35–104)
ALT SERPL W P-5'-P-CCNC: 33 U/L (ref 10–35)
ANION GAP SERPL CALCULATED.3IONS-SCNC: 8 MMOL/L (ref 7–15)
AST SERPL W P-5'-P-CCNC: 33 U/L (ref 10–35)
BASOPHILS # BLD AUTO: 0.1 10E3/UL (ref 0–0.2)
BASOPHILS NFR BLD AUTO: 1 %
BILIRUB SERPL-MCNC: 0.2 MG/DL
BUN SERPL-MCNC: 16.1 MG/DL (ref 8–23)
CALCIUM SERPL-MCNC: 10 MG/DL (ref 8.8–10.2)
CHLORIDE SERPL-SCNC: 104 MMOL/L (ref 98–107)
CREAT SERPL-MCNC: 0.83 MG/DL (ref 0.51–0.95)
DEPRECATED HCO3 PLAS-SCNC: 29 MMOL/L (ref 22–29)
EOSINOPHIL # BLD AUTO: 0.6 10E3/UL (ref 0–0.7)
EOSINOPHIL NFR BLD AUTO: 8 %
ERYTHROCYTE [DISTWIDTH] IN BLOOD BY AUTOMATED COUNT: 13.2 % (ref 10–15)
GFR SERPL CREATININE-BSD FRML MDRD: 75 ML/MIN/1.73M2
GLUCOSE SERPL-MCNC: 98 MG/DL (ref 70–99)
HCT VFR BLD AUTO: 37.8 % (ref 35–47)
HGB BLD-MCNC: 12.3 G/DL (ref 11.7–15.7)
IMM GRANULOCYTES # BLD: 0 10E3/UL
IMM GRANULOCYTES NFR BLD: 0 %
LYMPHOCYTES # BLD AUTO: 2 10E3/UL (ref 0.8–5.3)
LYMPHOCYTES NFR BLD AUTO: 27 %
MAGNESIUM SERPL-MCNC: 2.4 MG/DL (ref 1.7–2.3)
MCH RBC QN AUTO: 29.6 PG (ref 26.5–33)
MCHC RBC AUTO-ENTMCNC: 32.5 G/DL (ref 31.5–36.5)
MCV RBC AUTO: 91 FL (ref 78–100)
MONOCYTES # BLD AUTO: 0.5 10E3/UL (ref 0–1.3)
MONOCYTES NFR BLD AUTO: 7 %
NEUTROPHILS # BLD AUTO: 4.3 10E3/UL (ref 1.6–8.3)
NEUTROPHILS NFR BLD AUTO: 57 %
PLATELET # BLD AUTO: 305 10E3/UL (ref 150–450)
POTASSIUM SERPL-SCNC: 4.2 MMOL/L (ref 3.4–5.3)
PROT SERPL-MCNC: 7.2 G/DL (ref 6.4–8.3)
RBC # BLD AUTO: 4.15 10E6/UL (ref 3.8–5.2)
SODIUM SERPL-SCNC: 141 MMOL/L (ref 136–145)
WBC # BLD AUTO: 7.5 10E3/UL (ref 4–11)

## 2022-07-18 PROCEDURE — 99214 OFFICE O/P EST MOD 30 MIN: CPT | Performed by: NURSE PRACTITIONER

## 2022-07-18 PROCEDURE — 80053 COMPREHEN METABOLIC PANEL: CPT | Performed by: NURSE PRACTITIONER

## 2022-07-18 PROCEDURE — 85025 COMPLETE CBC W/AUTO DIFF WBC: CPT | Performed by: NURSE PRACTITIONER

## 2022-07-18 PROCEDURE — 83735 ASSAY OF MAGNESIUM: CPT | Performed by: NURSE PRACTITIONER

## 2022-07-18 PROCEDURE — 36415 COLL VENOUS BLD VENIPUNCTURE: CPT | Performed by: NURSE PRACTITIONER

## 2022-07-18 RX ORDER — ALBUTEROL SULFATE 90 UG/1
2 AEROSOL, METERED RESPIRATORY (INHALATION) EVERY 6 HOURS
Qty: 18 G | Refills: 1 | Status: SHIPPED | OUTPATIENT
Start: 2022-07-18 | End: 2023-04-06

## 2022-07-18 RX ORDER — FLUTICASONE PROPIONATE AND SALMETEROL XINAFOATE 115; 21 UG/1; UG/1
2 AEROSOL, METERED RESPIRATORY (INHALATION) 2 TIMES DAILY
Qty: 12 G | Refills: 1 | Status: SHIPPED | OUTPATIENT
Start: 2022-07-18 | End: 2023-04-06

## 2022-07-18 NOTE — PATIENT INSTRUCTIONS
Start the Advair inhaler as directed, please rinse out your mouth after use.    I referred you to see pulmonology, call [phone] to set up your appointment for this.    Please download the [employer] fitness pal application on your phone.  Start tracking your caloric intake on a daily basis.  You should be eating at least 16 to 1800 juan r/day.    Push your water intake.    Your labs are processing this time, I will release results on [facility] [facility] once they are back.    Continue on your current medications.    Please get your Shingrix vaccines (2), Tdap shot in the community.    I will see you back when you are ready to recheck your cholesterol and your my fitness pal starr.

## 2022-07-18 NOTE — PROGRESS NOTES
Assessment & Plan     Encounter to establish care: Care established today.     Muscle cramping: Lower legs, comes and goes, but worst at night time. She is not drinking a lot of water. Will check electrolytes and kidney function.   - Comprehensive metabolic panel (BMP + Alb, Alk Phos, ALT, AST, Total. Bili, TP)  - Magnesium    Cough/Wheezing: Suspected COPD. Long smoking history. Chest x-ray from June showed some barrel chesting. Will start on advair and refer to Pulmonology.   - Adult Pulmonary Medicine Referral  - albuterol (PROAIR HFA/PROVENTIL HFA/VENTOLIN HFA) 108 (90 Base) MCG/ACT inhaler  Dispense: 18 g; Refill: 1  - fluticasone-salmeterol (ADVAIR HFA) 115-21 MCG/ACT inhaler  Dispense: 12 g; Refill: 1    Moderate aortic stenosis: Known, last echo was done 10/29/2021. Stable.     Age-related osteoporosis without current pathological fracture: hx of this. Bone density test was ordered last 03/08/2022.    Hyperlipidemia LDL goal <130: Hx of this, continues on rosuvastatin. Stable.     Screening for endocrine, nutritional, metabolic and immunity disorder  - CBC with platelets and differential    Return in about 3 months (around 10/18/2022) for Follow up.    Elena Hooks CNP  [facility]   Danni is a 71 year old presenting for the following health issues:  Establish Care (Deep muscle cramps in lower legs - hearing test - would like to lose weight)      History of Present Illness       Reason for visit:  Followup on bronchitis, weight loss, CBC, muscle cramps    She eats 2-3 servings of fruits and vegetables daily.She consumes 0 sweetened beverage(s) daily.She exercises with enough effort to increase her heart rate 9 or less minutes per day.  She exercises with enough effort to increase her heart rate 3 or less days per week.   She is taking medications regularly.     The patient presents today to establish care.    She reports dealing with bronchitis, and a  "chronic nagging cough voer the past coulpe of months. She reports that she was seen 06/24; and was treated for bronchitis. She is not coughing up as much sputum as before. Chest x-ray shows no acute pneumonias from that date, but some barrel chest, suspect COPD.    She does have a history of smoking, at her heaviest was smoking 1 pack per day. She is not currently smoking.    She reports still having issues with congestion, especially when playing pickle ball. Will start on Advair. Pulmonology referral placed.    No shortness of breath, chest pain, or chest pressure.    She reports that she does not drink a lot of water. She has been having muscle cramping in her lower legs. Will check for dehydration, potassium, and magnesium today.    Her  passed away at age 64, had blood clots. She has a Son and step daughter, both are alive and well.    She is retired.    Review of Systems   Constitutional, HEENT, cardiovascular, pulmonary, GI, , musculoskeletal, neuro, skin, endocrine and psych systems are negative, except as otherwise noted.      Objective    /76 (BP Location: Right arm, Patient Position: Sitting, Cuff Size: Adult Regular)   Pulse 75   Ht 1.499 m (4' 11\")   Wt 69.9 kg (154 lb)   SpO2 96%   BMI 31.10 kg/m    Body mass index is 31.1 kg/m .  Physical Exam   GENERAL: healthy, alert and no distress  EYES: Eyes grossly normal to inspection, PERRL and conjunctivae and sclerae normal  HENT: ear canals and TM's normal, nose and mouth without ulcers or lesions  NECK: no adenopathy, no asymmetry, masses, or scars and thyroid normal to palpation  RESP: lungs clear to auscultation - no rales, rhonchi or wheezes  CV: regular rate and rhythm, normal S1 S2, no S3 or S4, holosystolic murmur peripheral edema and peripheral pulses strong  ABDOMEN: soft, nontender, no hepatosplenomegaly, no masses and bowel sounds normal  MS: no gross musculoskeletal defects noted, no edema  SKIN: no suspicious lesions or " rashes  NEURO: Normal strength and tone, mentation intact and speech normal  PSYCH: mentation appears normal, affect normal/bright              .  ..

## 2022-07-20 DIAGNOSIS — R06.2 WHEEZING: ICD-10-CM

## 2022-07-20 DIAGNOSIS — R05.9 COUGH: Primary | ICD-10-CM

## 2022-08-16 ENCOUNTER — HOSPITAL ENCOUNTER (OUTPATIENT)
Dept: RESPIRATORY THERAPY | Facility: CLINIC | Age: 72
Discharge: HOME | End: 2022-08-16
Admitting: INTERNAL MEDICINE
Payer: COMMERCIAL

## 2022-08-16 DIAGNOSIS — R06.2 WHEEZING: ICD-10-CM

## 2022-08-16 DIAGNOSIS — R05.9 COUGH: ICD-10-CM

## 2022-08-16 PROCEDURE — 250N000009 HC RX 250: Performed by: INTERNAL MEDICINE

## 2022-08-16 PROCEDURE — 94729 DIFFUSING CAPACITY: CPT | Mod: 26 | Performed by: INTERNAL MEDICINE

## 2022-08-16 PROCEDURE — 999N000157 HC STATISTIC RCP TIME EA 10 MIN

## 2022-08-16 PROCEDURE — 94726 PLETHYSMOGRAPHY LUNG VOLUMES: CPT

## 2022-08-16 PROCEDURE — 94060 EVALUATION OF WHEEZING: CPT

## 2022-08-16 PROCEDURE — 94726 PLETHYSMOGRAPHY LUNG VOLUMES: CPT | Mod: 26 | Performed by: INTERNAL MEDICINE

## 2022-08-16 PROCEDURE — 94729 DIFFUSING CAPACITY: CPT

## 2022-08-16 PROCEDURE — 94060 EVALUATION OF WHEEZING: CPT | Mod: 26 | Performed by: INTERNAL MEDICINE

## 2022-08-16 RX ORDER — ALBUTEROL SULFATE 0.83 MG/ML
2.5 SOLUTION RESPIRATORY (INHALATION) ONCE
Status: COMPLETED | OUTPATIENT
Start: 2022-08-16 | End: 2022-08-16

## 2022-08-16 RX ADMIN — ALBUTEROL SULFATE 2.5 MG: 2.5 SOLUTION RESPIRATORY (INHALATION) at 07:25

## 2022-08-16 NOTE — PROGRESS NOTES
RESPIRATORY CARE NOTE    Complete Pulmonary Function Test completed by patient.  Good patient effort and cooperation. Albuterol 2.5 mg neb given for bronchodilation.  The results of this test meet the ATS standards for acceptability and repeatability. PT returned to baseline and left in no distress.    Zoey Rosado, RT  8/16/2022

## 2022-08-18 LAB
DLCOCOR-%PRED-PRE: 102 %
DLCOCOR-PRE: 16.71 ML/MIN/MMHG
DLCOUNC-%PRED-PRE: 99 %
DLCOUNC-PRE: 16.11 ML/MIN/MMHG
DLCOUNC-PRED: 16.25 ML/MIN/MMHG
ERV-%PRED-PRE: 44 %
ERV-PRE: 0.15 L
ERV-PRED: 0.33 L
EXPTIME-PRE: 6.3 SEC
FEF2575-%PRED-POST: 175 %
FEF2575-%PRED-PRE: 122 %
FEF2575-POST: 2.88 L/SEC
FEF2575-PRE: 2 L/SEC
FEF2575-PRED: 1.64 L/SEC
FEFMAX-%PRED-PRE: 112 %
FEFMAX-PRE: 5.43 L/SEC
FEFMAX-PRED: 4.83 L/SEC
FEV1-%PRED-PRE: 105 %
FEV1-PRE: 1.94 L
FEV1FEV6-PRE: 81 %
FEV1FEV6-PRED: 79 %
FEV1FVC-PRE: 81 %
FEV1FVC-PRED: 79 %
FEV1SVC-PRE: 100 %
FEV1SVC-PRED: 76 %
FIFMAX-PRE: 4.16 L/SEC
FRCPLETH-%PRED-PRE: 90 %
FRCPLETH-PRE: 2.19 L
FRCPLETH-PRED: 2.43 L
FVC-%PRED-PRE: 102 %
FVC-PRE: 2.39 L
FVC-PRED: 2.34 L
IC-%PRED-PRE: 85 %
IC-PRE: 1.77 L
IC-PRED: 2.08 L
RVPLETH-%PRED-PRE: 109 %
RVPLETH-PRE: 2.02 L
RVPLETH-PRED: 1.85 L
TLCPLETH-%PRED-PRE: 96 %
TLCPLETH-PRE: 3.96 L
TLCPLETH-PRED: 4.1 L
VA-%PRED-PRE: 112 %
VA-PRE: 4.25 L
VC-%PRED-PRE: 80 %
VC-PRE: 1.94 L
VC-PRED: 2.41 L

## 2022-09-18 ENCOUNTER — HEALTH MAINTENANCE LETTER (OUTPATIENT)
Age: 72
End: 2022-09-18

## 2022-09-29 DIAGNOSIS — E78.00 PURE HYPERCHOLESTEROLEMIA: ICD-10-CM

## 2022-09-29 RX ORDER — ROSUVASTATIN CALCIUM 10 MG/1
TABLET, COATED ORAL
Qty: 45 TABLET | Refills: 7 | OUTPATIENT
Start: 2022-09-29

## 2022-10-27 ENCOUNTER — MYC MEDICAL ADVICE (OUTPATIENT)
Dept: FAMILY MEDICINE | Facility: CLINIC | Age: 72
End: 2022-10-27

## 2022-10-27 DIAGNOSIS — E78.5 HYPERLIPIDEMIA, UNSPECIFIED HYPERLIPIDEMIA TYPE: Primary | ICD-10-CM

## 2022-10-28 NOTE — TELEPHONE ENCOUNTER
Pt requesting fasting lipid labs    Last office visit was 07/18/2022    Patient has not had lipid lab done since 09/11/2019    Okay for order or advise if patient needs to be seen.

## 2022-10-31 ENCOUNTER — HOSPITAL ENCOUNTER (OUTPATIENT)
Dept: CARDIOLOGY | Facility: CLINIC | Age: 72
Discharge: HOME | End: 2022-10-31
Attending: INTERNAL MEDICINE | Admitting: INTERNAL MEDICINE
Payer: COMMERCIAL

## 2022-10-31 DIAGNOSIS — I35.0 NONRHEUMATIC AORTIC VALVE STENOSIS: ICD-10-CM

## 2022-10-31 LAB — LVEF ECHO: NORMAL

## 2022-10-31 PROCEDURE — 93306 TTE W/DOPPLER COMPLETE: CPT | Mod: 26 | Performed by: INTERNAL MEDICINE

## 2022-10-31 PROCEDURE — 93306 TTE W/DOPPLER COMPLETE: CPT

## 2022-11-03 ENCOUNTER — LAB (OUTPATIENT)
Dept: LAB | Facility: CLINIC | Age: 72
End: 2022-11-03
Payer: COMMERCIAL

## 2022-11-03 DIAGNOSIS — Z11.59 NEED FOR HEPATITIS C SCREENING TEST: Primary | ICD-10-CM

## 2022-11-03 DIAGNOSIS — E78.5 HYPERLIPIDEMIA, UNSPECIFIED HYPERLIPIDEMIA TYPE: ICD-10-CM

## 2022-11-03 LAB
CHOLEST SERPL-MCNC: 183 MG/DL
FASTING STATUS PATIENT QL REPORTED: YES
HCV AB SERPL QL IA: NONREACTIVE
HDLC SERPL-MCNC: 66 MG/DL
LDLC SERPL CALC-MCNC: 93 MG/DL
NONHDLC SERPL-MCNC: 117 MG/DL
TRIGL SERPL-MCNC: 122 MG/DL

## 2022-11-03 PROCEDURE — 86803 HEPATITIS C AB TEST: CPT

## 2022-11-03 PROCEDURE — 80061 LIPID PANEL: CPT

## 2022-11-03 PROCEDURE — 36415 COLL VENOUS BLD VENIPUNCTURE: CPT

## 2022-11-07 ENCOUNTER — TRANSFERRED RECORDS (OUTPATIENT)
Dept: HEALTH INFORMATION MANAGEMENT | Facility: CLINIC | Age: 72
End: 2022-11-07

## 2022-11-16 DIAGNOSIS — I35.0 NONRHEUMATIC AORTIC VALVE STENOSIS: Primary | ICD-10-CM

## 2022-12-28 ENCOUNTER — MYC MEDICAL ADVICE (OUTPATIENT)
Dept: INTERNAL MEDICINE | Facility: CLINIC | Age: 72
End: 2022-12-28

## 2022-12-28 DIAGNOSIS — Q78.2 OSTEOPETROSIS: ICD-10-CM

## 2022-12-29 RX ORDER — ALENDRONATE SODIUM 70 MG/1
TABLET ORAL
Qty: 12 TABLET | Refills: 1 | Status: SHIPPED | OUTPATIENT
Start: 2022-12-29 | End: 2023-04-21

## 2022-12-29 NOTE — TELEPHONE ENCOUNTER
"Last Written Prescription Date:  2/9/22  Last Fill Quantity: 12,  # refills: 3   Last office visit provider:  7/18/22     Requested Prescriptions   Pending Prescriptions Disp Refills     alendronate (FOSAMAX) 70 MG tablet 12 tablet 3     Sig: TAKE 1 TABLET (70 MG TOTAL) BY MOUTH EVERY 7 DAYS.       Bisphosphonates Passed - 12/28/2022  3:27 PM        Passed - Recent (12 mo) or future (30 days) visit within the authorizing provider's specialty     Patient has had an office visit with the authorizing provider or a provider within the authorizing providers department within the previous 12 mos or has a future within next 30 days. See \"Patient Info\" tab in inbasket, or \"Choose Columns\" in Meds & Orders section of the refill encounter.              Passed - Dexa on file within past 2 years     Please review last Dexa result.           Passed - Medication is active on med list        Passed - Patient is age 18 or older        Passed - Normal serum creatinine on file within past 12 months     Recent Labs   Lab Test 07/18/22  1402   CR 0.83       Ok to refill medication if creatinine is low               Judith Rosado, RN 12/29/22 3:21 PM  "

## 2023-01-20 ENCOUNTER — ANCILLARY PROCEDURE (OUTPATIENT)
Dept: MAMMOGRAPHY | Facility: CLINIC | Age: 73
End: 2023-01-20
Attending: NURSE PRACTITIONER
Payer: COMMERCIAL

## 2023-01-20 DIAGNOSIS — Z12.31 VISIT FOR SCREENING MAMMOGRAM: ICD-10-CM

## 2023-01-20 PROCEDURE — 77063 BREAST TOMOSYNTHESIS BI: CPT | Mod: TC | Performed by: RADIOLOGY

## 2023-01-20 PROCEDURE — 77067 SCR MAMMO BI INCL CAD: CPT | Mod: TC | Performed by: RADIOLOGY

## 2023-01-27 ENCOUNTER — TRANSFERRED RECORDS (OUTPATIENT)
Dept: HEALTH INFORMATION MANAGEMENT | Facility: CLINIC | Age: 73
End: 2023-01-27

## 2023-02-02 ENCOUNTER — TRANSFERRED RECORDS (OUTPATIENT)
Dept: HEALTH INFORMATION MANAGEMENT | Facility: CLINIC | Age: 73
End: 2023-02-02

## 2023-03-08 ENCOUNTER — TRANSFERRED RECORDS (OUTPATIENT)
Dept: HEALTH INFORMATION MANAGEMENT | Facility: CLINIC | Age: 73
End: 2023-03-08

## 2023-03-15 NOTE — TELEPHONE ENCOUNTER
Orders being requested: Mammogram   Reason service is needed/diagnosis: Screening for breast cancer  When are orders needed by: Patient states that her last mammogram was 4/10/2018 in [place]. She was told that she needs to have one done yearly.   Where to send Orders: Enter into Epic and contact patient to schedule. She would like to go to [facility] in [place] since it is close to her son's house.   Okay to leave detailed message?  Yes       Problem: Skin/Tissue Integrity  Goal: Absence of new skin breakdown  Description: 1. Monitor for areas of redness and/or skin breakdown  2. Assess vascular access sites hourly  3. Every 4-6 hours minimum:  Change oxygen saturation probe site  4. Every 4-6 hours:  If on nasal continuous positive airway pressure, respiratory therapy assess nares and determine need for appliance change or resting period.   Outcome: Not Progressing     Problem: Safety - Adult  Goal: Free from fall injury  Outcome: Not Progressing

## 2023-04-06 ENCOUNTER — OFFICE VISIT (OUTPATIENT)
Dept: INTERNAL MEDICINE | Facility: CLINIC | Age: 73
End: 2023-04-06
Payer: COMMERCIAL

## 2023-04-06 VITALS
WEIGHT: 155 LBS | RESPIRATION RATE: 20 BRPM | DIASTOLIC BLOOD PRESSURE: 64 MMHG | HEIGHT: 59 IN | BODY MASS INDEX: 31.25 KG/M2 | TEMPERATURE: 98.6 F | OXYGEN SATURATION: 97 % | SYSTOLIC BLOOD PRESSURE: 138 MMHG | HEART RATE: 77 BPM

## 2023-04-06 DIAGNOSIS — I35.0 NONRHEUMATIC AORTIC VALVE STENOSIS: ICD-10-CM

## 2023-04-06 DIAGNOSIS — Z01.818 PREOPERATIVE EXAMINATION: Primary | ICD-10-CM

## 2023-04-06 DIAGNOSIS — E78.5 HYPERLIPIDEMIA, UNSPECIFIED HYPERLIPIDEMIA TYPE: ICD-10-CM

## 2023-04-06 DIAGNOSIS — M25.561 RIGHT KNEE PAIN, UNSPECIFIED CHRONICITY: ICD-10-CM

## 2023-04-06 PROBLEM — Z98.890 HISTORY OF ROTATOR CUFF SURGERY: Status: ACTIVE | Noted: 2019-01-02

## 2023-04-06 LAB
ANION GAP SERPL CALCULATED.3IONS-SCNC: 13 MMOL/L (ref 7–15)
ATRIAL RATE - MUSE: 65 BPM
BUN SERPL-MCNC: 18.2 MG/DL (ref 8–23)
CALCIUM SERPL-MCNC: 9.7 MG/DL (ref 8.8–10.2)
CHLORIDE SERPL-SCNC: 106 MMOL/L (ref 98–107)
CREAT SERPL-MCNC: 0.67 MG/DL (ref 0.51–0.95)
DEPRECATED HCO3 PLAS-SCNC: 23 MMOL/L (ref 22–29)
DIASTOLIC BLOOD PRESSURE - MUSE: NORMAL MMHG
ERYTHROCYTE [DISTWIDTH] IN BLOOD BY AUTOMATED COUNT: 14 % (ref 10–15)
GFR SERPL CREATININE-BSD FRML MDRD: >90 ML/MIN/1.73M2
GLUCOSE SERPL-MCNC: 112 MG/DL (ref 70–99)
HCT VFR BLD AUTO: 37.9 % (ref 35–47)
HGB BLD-MCNC: 12.2 G/DL (ref 11.7–15.7)
INTERPRETATION ECG - MUSE: NORMAL
MCH RBC QN AUTO: 28.2 PG (ref 26.5–33)
MCHC RBC AUTO-ENTMCNC: 32.2 G/DL (ref 31.5–36.5)
MCV RBC AUTO: 88 FL (ref 78–100)
P AXIS - MUSE: 55 DEGREES
PLATELET # BLD AUTO: 307 10E3/UL (ref 150–450)
POTASSIUM SERPL-SCNC: 3.9 MMOL/L (ref 3.4–5.3)
PR INTERVAL - MUSE: 146 MS
QRS DURATION - MUSE: 86 MS
QT - MUSE: 394 MS
QTC - MUSE: 409 MS
R AXIS - MUSE: 33 DEGREES
RBC # BLD AUTO: 4.32 10E6/UL (ref 3.8–5.2)
SODIUM SERPL-SCNC: 142 MMOL/L (ref 136–145)
SYSTOLIC BLOOD PRESSURE - MUSE: NORMAL MMHG
T AXIS - MUSE: 71 DEGREES
VENTRICULAR RATE- MUSE: 65 BPM
WBC # BLD AUTO: 7.2 10E3/UL (ref 4–11)

## 2023-04-06 PROCEDURE — 93010 ELECTROCARDIOGRAM REPORT: CPT | Performed by: INTERNAL MEDICINE

## 2023-04-06 PROCEDURE — 99214 OFFICE O/P EST MOD 30 MIN: CPT | Performed by: NURSE PRACTITIONER

## 2023-04-06 PROCEDURE — 36415 COLL VENOUS BLD VENIPUNCTURE: CPT | Performed by: NURSE PRACTITIONER

## 2023-04-06 PROCEDURE — 85027 COMPLETE CBC AUTOMATED: CPT | Performed by: NURSE PRACTITIONER

## 2023-04-06 PROCEDURE — 80048 BASIC METABOLIC PNL TOTAL CA: CPT | Performed by: NURSE PRACTITIONER

## 2023-04-06 PROCEDURE — 93005 ELECTROCARDIOGRAM TRACING: CPT | Performed by: NURSE PRACTITIONER

## 2023-04-06 NOTE — PROGRESS NOTES
[facility]  [address] [address]  Phone: [phone]  Fax: [phone]  Primary Provider: Elena Hooks  Pre-op Performing Provider: CANDELARIO COLUNGA      PREOPERATIVE EVALUATION:  Today's date: 4/6/2023    Pamela Engle is a 72 year old female who presents for a preoperative evaluation.      4/6/2023    12:58 PM   Additional Questions   Roomed by Marizol FALLON     Surgical Information:  Surgery/Procedure: Right Toal Knee  Surgery Location: [facility]  Surgeon: Dr Yadav  Surgery Date: 4-  Time of Surgery: ?  Where patient plans to recover: At home with family  Fax number for surgical facility: [phone]    Assessment & Plan     The proposed surgical procedure is considered INTERMEDIATE risk.    Preoperative examination  No contraindications for planned procedure.    EKG personally interpreted as normal sinus rhythm with no ST changes.    We will check a CBC and BMP as well today    - EKG 12-lead, tracing only  - Basic metabolic panel; Future  - CBC with platelets; Future    Right knee pain, unspecified chronicity  Plan for right TKA    Hyperlipidemia, unspecified hyperlipidemia type  No history of CVA or MI    Nonrheumatic aortic valve stenosis  Asymptomatic.  2021 echocardiogram reviewed with mild to moderate stenosis present.  She will be due for her follow-up echocardiogram in about 6 months.    Osteoporosis  On Fosamax weekly. No fractures.     Patient Instructions   Hold all supplements, aspirin and NSAIDs for 7 days prior to surgery.     Follow your surgeon's direction on when to stop eating and drinking prior to surgery.    Your surgeon will be managing your pain after your surgery.      Remove all jewelry and metal piercings before your surgery.     Remove nail polish from fingers before surgery.    If you use a CPAP machine, bring this with you to surgery.         Risks and Recommendations:  The patient has the following  additional risks and recommendations for perioperative complications:   - No identified additional risk factors other than previously addressed    Medication Instructions:  Patient is to take all scheduled medications on the day of surgery    RECOMMENDATION:  APPROVAL GIVEN to proceed with proposed procedure, without further diagnostic evaluation.      Subjective     HPI related to upcoming procedure: As above        4/3/2023    10:22 AM   Preop Questions   1. Have you ever had a heart attack or stroke? No   2. Have you ever had surgery on your heart or blood vessels, such as a stent placement, a coronary artery bypass, or surgery on an artery in your head, neck, heart, or legs? No   3. Do you have chest pain with activity? No   4. Do you have a history of  heart failure? No   5. Do you currently have a cold, bronchitis or symptoms of other infection? No   6. Do you have a cough, shortness of breath, or wheezing? No   7. Do you or anyone in your family have previous history of blood clots? No   8. Do you or does anyone in your family have a serious bleeding problem such as prolonged bleeding following surgeries or cuts? No   9. Have you ever had problems with anemia or been told to take iron pills? YES -    10. Have you had any abnormal blood loss such as black, tarry or bloody stools, or abnormal vaginal bleeding? No   11. Have you ever had a blood transfusion? No   12. Are you willing to have a blood transfusion if it is medically needed before, during, or after your surgery? Yes   13. Have you or any of your relatives ever had problems with anesthesia? YES - She gets shakes and shivers   14. Do you have sleep apnea, excessive snoring or daytime drowsiness? No   15. Do you have any artifical heart valves or other implanted medical devices like a pacemaker, defibrillator, or continuous glucose monitor? No   16. Do you have artificial joints? No   17. Are you allergic to latex? No       Health Care  Directive:  Patient has a Health Care Directive on file      Preoperative Review of :   reviewed - no record of controlled substances prescribed.    Status of Chronic Conditions:  See problem list for active medical problems.  Problems all longstanding and stable, except as noted/documented.  See ROS for pertinent symptoms related to these conditions.      Review of Systems  CONSTITUTIONAL: NEGATIVE for fever, chills, change in weight  INTEGUMENTARY/SKIN: NEGATIVE for worrisome rashes, moles or lesions  EYES: NEGATIVE for vision changes or irritation  ENT/MOUTH: NEGATIVE for ear, mouth and throat problems  RESP: NEGATIVE for significant cough or SOB  CV: NEGATIVE for chest pain, palpitations or peripheral edema  GI: NEGATIVE for nausea, abdominal pain, heartburn, or change in bowel habits  : NEGATIVE for frequency, dysuria, or hematuria  MUSCULOSKELETAL: NEGATIVE for significant arthralgias or myalgia  NEURO: NEGATIVE for weakness, dizziness or paresthesias  ENDOCRINE: NEGATIVE for temperature intolerance, skin/hair changes  HEME: NEGATIVE for bleeding problems  PSYCHIATRIC: NEGATIVE for changes in mood or affect    Patient Active Problem List    Diagnosis Date Noted     Aortic valve stenosis 10/29/2021     Priority: Medium     S/P lumbar fusion 10/07/2020     Priority: Medium     Chest mass 09/24/2019     Priority: Medium     Added automatically from request for surgery [id number]       History of rotator cuff surgery 01/02/2019     Priority: Medium     Fuchs' endothelial dystrophy 09/20/2018     Priority: Medium     Hyperlipidemia 09/20/2018     Priority: Medium     Oral herpes 09/20/2018     Priority: Medium     Osteoarthrosis 09/20/2018     Priority: Medium      Past Medical History:   Diagnosis Date     Anemia 2012    after surgery     Aortic stenosis     cardiology visit follow up due Fall of 2021     Arthritis      Complication of anesthesia      Gastroesophageal reflux disease     rarely     Hepatitis      hepatitis A as a child     History of anesthesia complications     Tremors after surgery      Primary insomnia 10/5/2020     Past Surgical History:   Procedure Laterality Date     ARTHROSCOPY KNEE Left      ARTHROSCOPY KNEE Right 2010     BIOPSY  2001    bone marrow donator     BUNIONECTOMY PORTER Bilateral 's    and hammer toe surgery     ENT SURGERY  's     EXCISE MASS TRUNK N/A 2019    Procedure: EXCISION SUBCUTANEOUS MASS UPPER CHEST MIDLINE;  Surgeon: Karla Vasquez MD;  Location: [facility] OR     EXCISE CASTANO'S NEUROMA FOOT  2007 and  redo 2009     GYN SURGERY  1984    tubal ligation     ORTHOPEDIC SURGERY Right 2019    rotator cuff repair     SOFT TISSUE SURGERY  2012    lipoma removed from back     Current Outpatient Medications   Medication Sig Dispense Refill     alendronate (FOSAMAX) 70 MG tablet TAKE 1 TABLET (70 MG TOTAL) BY MOUTH EVERY 7 DAYS. 12 tablet 1     Calcium-Magnesium-Zinc (HUGO-MAG-ZINC OR) Take 500 mg by mouth 2 times daily       Cholecalciferol (VITAMIN D3) 50 MCG (2000 UT) CAPS Take by mouth daily       ibuprofen (ADVIL/MOTRIN) 200 MG tablet Take 3 tablets (600 mg) by mouth every 6 hours as needed for pain 50 tablet 0     Multiple Vitamins-Minerals (HAIR SKIN AND NAILS FORMULA) TABS Take by mouth daily       Multiple Vitamins-Minerals (MULTIVITAMIN PO) Take by mouth daily       rosuvastatin (CRESTOR) 10 MG tablet Take 1 tablet (10 mg) by mouth At Bedtime 90 tablet 3     valACYclovir (VALTREX) 1000 mg tablet Take two tablets by mouth twice a day for one day.  As needed for cold sores 20 tablet 1       Allergies   Allergen Reactions     Hydrocodone Swelling        Social History     Tobacco Use     Smoking status: Former     Packs/day: 1.00     Types: Cigarettes     Start date: 1969     Quit date: 2010     Years since quittin.1     Smokeless tobacco: Never   Vaping Use     Vaping status: Never Used   Substance Use Topics     Alcohol use: Yes      "Alcohol/week: 2.0 standard drinks of alcohol     Comment: 1-2 drinks per week     Family History   Problem Relation Age of Onset     CABG Mother 77.00     Arthritis Mother      Kidney Disease Mother      Colon Cancer Father      Hypertension Father      Hyperlipidemia Father      Prostate Cancer Father      Dementia Father      CABG Sister 58.00     Diabetes Sister      Liver Cancer Maternal Grandmother      Lung Cancer Paternal Grandfather      Substance Abuse Brother      Breast Cancer Paternal Grandmother      Coronary Stenting Sister 63.00        2 stents     History   Drug Use No         Objective     /64 (BP Location: Right arm, Patient Position: Sitting, Cuff Size: Adult Regular)   Pulse 77   Temp 98.6  F (37  C)   Resp 20   Ht 1.499 m (4' 11\")   Wt 70.3 kg (155 lb)   SpO2 97%   BMI 31.31 kg/m      Physical Exam    GENERAL APPEARANCE: healthy, alert and no distress     EYES: EOMI, PERRL     HENT: ear canals and TM's normal and nose and mouth without ulcers or lesions     NECK: no adenopathy, no asymmetry, masses, or scars and thyroid normal to palpation     RESP: lungs clear to auscultation - no rales, rhonchi or wheezes     CV: regular rates and rhythm, normal S1 S2, no S3 or S4 and no murmur, click or rub     ABDOMEN:  soft, nontender, no HSM or masses and bowel sounds normal     MS: extremities normal- no gross deformities noted, no evidence of inflammation in joints, FROM in all extremities.     SKIN: no suspicious lesions or rashes     NEURO: Normal strength and tone, sensory exam grossly normal, mentation intact and speech normal     PSYCH: mentation appears normal. and affect normal/bright     LYMPHATICS: No cervical adenopathy    Recent Labs   Lab Test 07/18/22  1402 02/09/22  1400   HGB 12.3  --      --     142   POTASSIUM 4.2 4.1   CR 0.83 0.72   A1C  --  5.6        Diagnostics:  Labs pending at this time.  Results will be reviewed when available.       Revised Cardiac Risk " Index (RCRI):  The patient has the following serious cardiovascular risks for perioperative complications:   - No serious cardiac risks = 0 points     RCRI Interpretation: 1 point: Class II (low risk - 0.9% complication rate)           Signed Electronically by: Nic Lawrence CNP  Copy of this evaluation report is provided to requesting physician.

## 2023-04-13 ENCOUNTER — TRANSFERRED RECORDS (OUTPATIENT)
Dept: HEALTH INFORMATION MANAGEMENT | Facility: CLINIC | Age: 73
End: 2023-04-13
Payer: COMMERCIAL

## 2023-04-21 DIAGNOSIS — Q78.2 OSTEOPETROSIS: ICD-10-CM

## 2023-04-21 RX ORDER — ALENDRONATE SODIUM 70 MG/1
TABLET ORAL
Qty: 12 TABLET | Refills: 3 | Status: SHIPPED | OUTPATIENT
Start: 2023-04-21 | End: 2024-06-17

## 2023-04-21 NOTE — TELEPHONE ENCOUNTER
"Routing refill request to provider for review/approval because:  Early refill requested.    Last Written Prescription Date:  12/29/22  Last Fill Quantity: 12,  # refills: 1   Last office visit provider:  4/6/23     Requested Prescriptions   Pending Prescriptions Disp Refills     alendronate (FOSAMAX) 70 MG tablet [Pharmacy Med Name: ALENDRONATE SODIUM 70 MG TAB] 12 tablet 1     Sig: TAKE 1 TABLET (70 MG TOTAL) BY MOUTH EVERY 7 DAYS       Bisphosphonates Passed - 4/21/2023  2:57 PM        Passed - Recent (12 mo) or future (30 days) visit within the authorizing provider's specialty     Patient has had an office visit with the authorizing provider or a provider within the authorizing providers department within the previous 12 mos or has a future within next 30 days. See \"Patient Info\" tab in inbasket, or \"Choose Columns\" in Meds & Orders section of the refill encounter.              Passed - Dexa on file within past 2 years     Please review last Dexa result.           Passed - Medication is active on med list        Passed - Patient is age 18 or older        Passed - Normal serum creatinine on file within past 12 months     Recent Labs   Lab Test 04/06/23  1327   CR 0.67       Ok to refill medication if creatinine is low               Benedicto Espinosa, RN 04/21/23 2:58 PM  "

## 2023-04-28 ENCOUNTER — TRANSFERRED RECORDS (OUTPATIENT)
Dept: HEALTH INFORMATION MANAGEMENT | Facility: CLINIC | Age: 73
End: 2023-04-28
Payer: COMMERCIAL

## 2023-05-07 ENCOUNTER — HEALTH MAINTENANCE LETTER (OUTPATIENT)
Age: 73
End: 2023-05-07

## 2023-05-18 DIAGNOSIS — B00.9 HSV (HERPES SIMPLEX VIRUS) INFECTION: ICD-10-CM

## 2023-05-19 RX ORDER — VALACYCLOVIR HYDROCHLORIDE 1 G/1
TABLET, FILM COATED ORAL
Qty: 20 TABLET | Refills: 1 | Status: SHIPPED | OUTPATIENT
Start: 2023-05-19 | End: 2023-07-11

## 2023-05-19 NOTE — TELEPHONE ENCOUNTER
"Routing refill request to provider for review/approval because:  A break in medication    Last Written Prescription Date:  8/12/2021  Last Fill Quantity: 20,  # refills: 1   Last office visit provider:  4/6/2023     Requested Prescriptions   Pending Prescriptions Disp Refills     valACYclovir (VALTREX) 1000 mg tablet [Pharmacy Med Name: VALACYCLOVIR HCL 1 GRAM TABLET] 20 tablet 1     Sig: TAKE TWO TABLETS BY MOUTH TWICE A DAY FOR ONE DAY. AS NEEDED FOR COLD SORES       Antivirals for Herpes Protocol Failed - 5/19/2023  1:48 PM        Failed - Recent (12 mo) or future (30 days) visit within the authorizing provider's specialty     Patient has had an office visit with the authorizing provider or a provider within the authorizing providers department within the previous 12 mos or has a future within next 30 days. See \"Patient Info\" tab in inbasket, or \"Choose Columns\" in Meds & Orders section of the refill encounter.              Passed - Patient is age 12 or older        Passed - Medication is active on med list        Passed - Normal serum creatinine on file in past 12 months     Recent Labs   Lab Test 04/06/23  1327   CR 0.67       Ok to refill medication if creatinine is low               Janice Fuentes RN 05/19/23 1:49 PM  "

## 2023-05-22 ENCOUNTER — TRANSFERRED RECORDS (OUTPATIENT)
Dept: HEALTH INFORMATION MANAGEMENT | Facility: CLINIC | Age: 73
End: 2023-05-22
Payer: COMMERCIAL

## 2023-06-18 NOTE — TELEPHONE ENCOUNTER
Monday surgery  10/05/2020  Patient had a surgery for spinal stenosis.  She is calling due to constipation.  She has not had a BM since Monday before surgery, and she has many questions regarding diet, medication , and exercise.  Patient was advised on diet, and many otc products to assist with a BM< and getting back to having regular BM's.  Patient reports she will try things.  And if no BM in the next day , she should return call to us.  Aminah Martin RN  [facility] Triage/refill nurse    Reason for Disposition    Constipation persists > 1 week and no improvement after using CARE ADVICE    Additional Information    Negative: Abdomen pain is the main symptom and adult male    Negative: Abdomen pain is the main symptom and adult female    Negative: Rectal bleeding or blood in stool is the main symptom    Negative: Patient sounds very sick or weak to the triager    Negative: Constant abdominal pain lasting > 2 hours    Negative: Vomiting bile (green color)    Negative: Vomiting and abdomen looks much more swollen than usual    Negative: Rectal pain or fullness from fecal impaction (rectum full of stool) and NOT better after SITZ bath, suppository or enema    Negative: Abdomen is more swollen than usual    Negative: Last bowel movement (BM) > 4 days ago    Negative: Leaking stool    Negative: Intermittent mild abdominal pain and fever    Protocols used: CONSTIPATION-A-OH      
intact

## 2023-07-07 ENCOUNTER — TRANSFERRED RECORDS (OUTPATIENT)
Dept: HEALTH INFORMATION MANAGEMENT | Facility: CLINIC | Age: 73
End: 2023-07-07
Payer: COMMERCIAL

## 2023-07-11 ENCOUNTER — TRANSFERRED RECORDS (OUTPATIENT)
Dept: HEALTH INFORMATION MANAGEMENT | Facility: CLINIC | Age: 73
End: 2023-07-11

## 2023-07-11 ENCOUNTER — OFFICE VISIT (OUTPATIENT)
Dept: INTERNAL MEDICINE | Facility: CLINIC | Age: 73
End: 2023-07-11
Payer: COMMERCIAL

## 2023-07-11 VITALS
HEIGHT: 57 IN | WEIGHT: 137 LBS | RESPIRATION RATE: 16 BRPM | OXYGEN SATURATION: 97 % | SYSTOLIC BLOOD PRESSURE: 140 MMHG | BODY MASS INDEX: 29.56 KG/M2 | HEART RATE: 96 BPM | DIASTOLIC BLOOD PRESSURE: 80 MMHG | TEMPERATURE: 97.6 F

## 2023-07-11 DIAGNOSIS — Z00.00 ENCOUNTER FOR ANNUAL WELLNESS EXAM IN MEDICARE PATIENT: Primary | ICD-10-CM

## 2023-07-11 DIAGNOSIS — Z01.10 ENCOUNTER FOR EXAMINATION OF HEARING WITHOUT ABNORMAL FINDINGS: ICD-10-CM

## 2023-07-11 DIAGNOSIS — I35.0 NONRHEUMATIC AORTIC VALVE STENOSIS: ICD-10-CM

## 2023-07-11 DIAGNOSIS — Z87.891 PERSONAL HISTORY OF TOBACCO USE: ICD-10-CM

## 2023-07-11 DIAGNOSIS — Z87.891 HISTORY OF TOBACCO ABUSE: ICD-10-CM

## 2023-07-11 DIAGNOSIS — E78.00 PURE HYPERCHOLESTEROLEMIA: ICD-10-CM

## 2023-07-11 DIAGNOSIS — M81.6 LOCALIZED OSTEOPOROSIS WITHOUT CURRENT PATHOLOGICAL FRACTURE: ICD-10-CM

## 2023-07-11 DIAGNOSIS — B00.9 HSV (HERPES SIMPLEX VIRUS) INFECTION: ICD-10-CM

## 2023-07-11 PROCEDURE — G0439 PPPS, SUBSEQ VISIT: HCPCS

## 2023-07-11 PROCEDURE — 99214 OFFICE O/P EST MOD 30 MIN: CPT | Mod: 25

## 2023-07-11 PROCEDURE — G0296 VISIT TO DETERM LDCT ELIG: HCPCS

## 2023-07-11 RX ORDER — VALACYCLOVIR HYDROCHLORIDE 1 G/1
TABLET, FILM COATED ORAL
Qty: 20 TABLET | Refills: 1 | Status: SHIPPED | OUTPATIENT
Start: 2023-07-11 | End: 2024-06-04

## 2023-07-11 NOTE — PATIENT INSTRUCTIONS
Lung Cancer Screening   Frequently Asked Questions  If you are at high-risk for lung cancer, getting screened with low-dose computed tomography (LDCT) every year can help save your life. This handout offers answers to some of the most common questions about lung cancer screening. If you have other questions, please call [phone] ([phone]).     What is it?  Lung cancer screening uses special X-ray technology to create an image of your lung tissue. The exam is quick and easy and takes less than 10 seconds. We don t give you any medicine or use any needles. You can eat before and after the exam. You don t need to change your clothes as long as the clothing on your chest doesn t contain metal. But, you do need to be able to hold your breath for at least 6 seconds during the exam.    What is the goal of lung cancer screening?  The goal of lung cancer screening is to save lives. Many times, lung cancer is not found until a person starts having physical symptoms. Lung cancer screening can help detect lung cancer in the earliest stages when it may be easier to treat.    Who should be screened for lung cancer?  We suggest lung cancer screening for anyone who is at high-risk for lung cancer. You are in the high-risk group if you:      are between the ages of 55 and 79, and    have smoked at least 1 pack of cigarettes a day for 20 or more years, and    still smoke or have quit within the past 15 years.    However, if you have a new cough or shortness of breath, you should talk to your doctor before being screened.    Why does it matter if I have symptoms?  Certain symptoms can be a sign that you have a condition in your lungs that should be checked and treated by your doctor. These symptoms include fever, chest pain, a new or changing cough, shortness of breath that you have never felt before, coughing up blood or unexplained weight loss. Having any of these symptoms can greatly affect the results of lung  cancer screening.       Should all smokers get an LDCT lung cancer screening exam?  It depends. Lung cancer screening is for a very specific group of men and women who have a history of heavy smoking over a long period of time (see  Who should be screened for lung cancer  above).  I am in the high-risk group, but have been diagnosed with cancer in the past. Is LDCT lung cancer screening right for me?  In some cases, you should not have LDCT lung screening, such as when your doctor is already following your cancer with CT scan studies. Your doctor will help you decide if LDCT lung screening is right for you.  Do I need to have a screening exam every year?  Yes. If you are in the high-risk group described earlier, you should get an LDCT lung cancer screening exam every year until you are 79, or are no longer willing or able to undergo screening and possible procedures to diagnose and treat lung cancer.  How effective is LDCT at preventing death from lung cancer?  Studies have shown that LDCT lung cancer screening can lower the risk of death from lung cancer by 20 percent in people who are at high-risk.  What are the risks?  There are some risks and limitations of LDCT lung cancer screening. We want to make sure you understand the risks and benefits, so please let us know if you have any questions. Your doctor may want to talk with you more about these risks.    Radiation exposure: As with any exam that uses radiation, there is a very small increased risk of cancer. The amount of radiation in LDCT is small--about the same amount a person would get from a mammogram. Your doctor orders the exam when he or she feels the potential benefits outweigh the risks.    False negatives: No test is perfect, including LDCT. It is possible that you may have a medical condition, including lung cancer, that is not found during your exam. This is called a false negative result.    False positives and more testing: LDCT very often finds  something in the lung that could be cancer, but in fact is not. This is called a false positive result. False positive tests often cause anxiety. To make sure these findings are not cancer, you may need to have more tests. These tests will be done only if you give us permission. Sometimes patients need a treatment that can have side effects, such as a biopsy. For more information on false positives, see  What can I expect from the results?     Findings not related to lung cancer: Your LDCT exam also takes pictures of areas of your body next to your lungs. In a very small number of cases, the CT scan will show an abnormal finding in one of these areas, such as your kidneys, adrenal glands, liver or thyroid. This finding may not be serious, but you may need more tests. Your doctor can help you decide what other tests you may need, if any.  What can I expect from the results?  About 1 out of 4 LDCT exams will find something that may need more tests. Most of the time, these findings are lung nodules. Lung nodules are very small collections of tissue in the lung. These nodules are very common, and the vast majority--more than 97 percent--are not cancer (benign). Most are normal lymph nodes or small areas of scarring from past infections.  But, if a small lung nodule is found to be cancer, the cancer can be cured more than 90 percent of the time. To know if the nodule is cancer, we may need to get more images before your next yearly screening exam. If the nodule has suspicious features (for example, it is large, has an odd shape or grows over time), we will refer you to a specialist for further testing.  Will my doctor also get the results?  Yes. Your doctor will get a copy of your results.  Is it okay to keep smoking now that there s a cancer screening exam?  No. Tobacco is one of the strongest cancer-causing agents. It causes not only lung cancer, but other cancers and cardiovascular (heart) diseases as well. The damage  caused by smoking builds over time. This means that the longer you smoke, the higher your risk of disease. While it is never too late to quit, the sooner you quit, the better.  Where can I find help to quit smoking?  The best way to prevent lung cancer is to stop smoking. If you have already quit smoking, congratulations and keep it up! For help on quitting smoking, please call [employer] at 0-849-QUITNOW (1-395.609.3187) or the [employer] at 1-265.204.9703 to find local resources near you.  One-on-one health coaching:  If you d prefer to work individually with a health care provider on tobacco cessation, we offer:      Medication Therapy Management:  Our specially trained pharmacists work closely with you and your doctor to help you quit smoking.  Call [phone] or [phone] (toll free).

## 2023-07-11 NOTE — PROGRESS NOTES
SUBJECTIVE:   Danni is a 72 year old who presents for Preventive Visit.      2023     1:16 PM   Additional Questions   Roomed by Charmaine     Are you in the first 12 months of your Medicare coverage?  No    HPI      Have you ever done Advance Care Planning? (For example, a Health Directive, POLST, or a discussion with a medical provider or your loved ones about your wishes): Yes, advance care planning is on file.       Fall risk  Fallen 2 or more times in the past year?: No  Any fall with injury in the past year?: Yes    Cognitive Screening   1) Repeat 3 items (Leader, Season, Table)      2) Clock draw:   NORMAL  3) 3 item recall: Recalls 3 objects  Results: 3 items recalled: COGNITIVE IMPAIRMENT LESS LIKELY    Mini-CogTM Copyright S Ximena. Licensed by the author for use in [facility] "[employer] reprinted with permission (rj@[facility]). All rights reserved.      Do you have sleep apnea, excessive snoring or daytime drowsiness?: yes    Reviewed and updated as needed this visit by clinical staff   Tobacco  Allergies    Med Hx  Surg Hx  Fam Hx  Soc Hx        Reviewed and updated as needed this visit by Provider                 Social History     Tobacco Use     Smoking status: Former     Packs/day: 1.00     Types: Cigarettes     Start date: 1969     Quit date: 2010     Years since quittin.4     Smokeless tobacco: Never   Substance Use Topics     Alcohol use: Yes     Alcohol/week: 2.0 standard drinks of alcohol     Comment: 1-2 drinks per week             2022    12:23 PM   Alcohol Use   Prescreen: >3 drinks/day or >7 drinks/week? No     Do you have a current opioid prescription? No  Do you use any other controlled substances or medications that are not prescribed by a provider? None        Current providers sharing in care for this patient include:   Patient Care Team:  Elena Hooks CNP as PCP - General (Nurse Practitioner - Gerontology)  Elena Hooks CNP as Assigned PCP    The  "following health maintenance items are reviewed in Epic and correct as of today:  Health Maintenance   Topic Date Due     LUNG CANCER SCREENING  Never done     COVID-19 Vaccine (6 - Moderna series) 01/28/2023     MEDICARE ANNUAL WELLNESS VISIT  02/09/2023     ANNUAL REVIEW OF HM ORDERS  07/18/2023     INFLUENZA VACCINE (1) 09/01/2023     FALL RISK ASSESSMENT  07/11/2024     COLORECTAL CANCER SCREENING  10/25/2024     MAMMO SCREENING  01/20/2025     ADVANCE CARE PLANNING  02/09/2027     LIPID  11/03/2027     DTAP/TDAP/TD IMMUNIZATION (3 - Td or Tdap) 07/21/2032     DEXA  03/08/2037     HEPATITIS C SCREENING  Completed     PHQ-2 (once per calendar year)  Completed     Pneumococcal Vaccine: 65+ Years  Completed     ZOSTER IMMUNIZATION  Completed     IPV IMMUNIZATION  Aged Out     MENINGITIS IMMUNIZATION  Aged Out         OBJECTIVE:   BP (!) 140/80   Pulse 96   Temp 97.6  F (36.4  C) (Oral)   Resp 16   Ht 1.46 m (4' 9.48\")   Wt 62.1 kg (137 lb)   SpO2 97%   BMI 29.15 kg/m   Estimated body mass index is 29.15 kg/m  as calculated from the following:    Height as of this encounter: 1.46 m (4' 9.48\").    Weight as of this encounter: 62.1 kg (137 lb).      Physical Exam  Constitutional:       General: She is not in acute distress.     Appearance: Normal appearance. She is not ill-appearing, toxic-appearing or diaphoretic.   HENT:      Head: Normocephalic and atraumatic.   Eyes:      Conjunctiva/sclera: Conjunctivae normal.   Cardiovascular:      Rate and Rhythm: Normal rate and regular rhythm.      Heart sounds: Normal heart sounds.   Pulmonary:      Effort: Pulmonary effort is normal.      Breath sounds: Normal breath sounds.   Skin:     General: Skin is warm and dry.   Neurological:      Mental Status: She is alert and oriented to person, place, and time.   Psychiatric:         Mood and Affect: Mood normal.         Behavior: Behavior normal.         Thought Content: Thought content normal.         Judgment: Judgment " "normal.       Diagnostic Test Results:  Labs reviewed in Epic    ASSESSMENT / PLAN:   (Z00.00) Encounter for annual wellness exam in Medicare patient  (primary encounter diagnosis)  Comment: Pt presents for physical    (M81.6) Localized osteoporosis without current pathological fracture  Comment: 2019: started Fosamax. Will update DXA at this location.   Plan: DX Hip/Pelvis/Spine           (E78.00) Pure hypercholesterolemia  Comment: Stable. Continue Crestor 10MG    (B00.9) HSV (herpes simplex virus) infection  Plan: valACYclovir (VALTREX) 1000 mg tablet            (Z01.10) Encounter for examination of hearing without abnormal findings  Plan: Adult Audiology  Referral            (I35.0) Nonrheumatic aortic valve stenosis  Comment:   Follows with Cardiology for hx of non-rheumatic aortic stenosis. Plan for f/u with Cardiology with ECHO prior in November of 2024.       (Z87.891) History of tobacco abuse  Plan: Prof fee: Shared Decision Making for Lung         Cancer Screening, CT Chest Lung Cancer Scrn Low        Dose wo          She will monitor BP at home: BP today in clinic 140/80. She does have significant family hx of CAD. She will monitor BP at home and send Dormzy message with readings in 4-6 weeks    Has been working on weight loss with [employer]. Has lost 18lbs since starting.     Patient has been advised of split billing requirements and indicates understanding: Yes      COUNSELING:  Reviewed preventive health counseling, as reflected in patient instructions       Regular exercise       Healthy diet/nutrition      BMI:   Estimated body mass index is 29.15 kg/m  as calculated from the following:    Height as of this encounter: 1.46 m (4' 9.48\").    Weight as of this encounter: 62.1 kg (137 lb).         She reports that she quit smoking about 13 years ago. Her smoking use included cigarettes. She started smoking about 54 years ago. She smoked an average of 1 pack per day. She has never used " smokeless tobacco.      Appropriate preventive services were discussed with this patient, including applicable screening as appropriate for cardiovascular disease, diabetes, osteopenia/osteoporosis, and glaucoma.  As appropriate for age/gender, discussed screening for colorectal cancer, prostate cancer, breast cancer, and cervical cancer. Checklist reviewing preventive services available has been given to the patient.    Reviewed patients plan of care and provided an AVS. The Basic Care Plan (routine screening as documented in Health Maintenance) for  meets the Care Plan requirement. This Care Plan has been established and reviewed with the Patient.          AL [facility]    Identified Health Risks:    I have reviewed Opioid Use Disorder and Substance Use Disorder risk factors and made any needed referrals.     Lung Cancer Screening Shared Decision Making Visit     Pamela Engle, a 72 year old female, is eligible for lung cancer screening    History   Smoking Status     Former     Packs/day: 1.00     Years: 40.00     Types: Cigarettes     Start date: 1/1/1969     Quit date: 2/1/2010   Smokeless Tobacco     Never       I have discussed with patient the risks and benefits of screening for lung cancer with low-dose CT.     The risks include:    radiation exposure: one low dose chest CT has as much ionizing radiation as about 15 chest x-rays, or 6 months of background radiation living in [place]      false positives: most findings/nodules are NOT cancer, but some might still require additional diagnostic evaluation, including biopsy    over-diagnosis: some slow growing cancers that might never have been clinically significant will be detected and treated unnecessarily     The benefit of early detection of lung cancer is contingent upon adherence to annual screening or more frequent follow up if indicated.     Furthermore, to benefit from screening,  must be  willing and able to undergo diagnostic procedures, if indicated. Although no specific guide is available for determining severity of comorbidities, it is reasonable to withhold screening in patients who have greater mortality risk from other diseases.     We did discuss that the best way to prevent lung cancer is to not smoke.    Some patients may value a numeric estimation of lung cancer risk when evaluating if lung cancer screening is right for them, here is one calculator:    ShouldIScreen

## 2023-07-28 ENCOUNTER — HOSPITAL ENCOUNTER (OUTPATIENT)
Dept: CT IMAGING | Facility: CLINIC | Age: 73
Discharge: HOME | End: 2023-07-28
Payer: COMMERCIAL

## 2023-07-28 DIAGNOSIS — Z87.891 HISTORY OF TOBACCO ABUSE: ICD-10-CM

## 2023-07-28 PROCEDURE — 71271 CT THORAX LUNG CANCER SCR C-: CPT

## 2023-09-07 ENCOUNTER — TRANSFERRED RECORDS (OUTPATIENT)
Dept: HEALTH INFORMATION MANAGEMENT | Facility: CLINIC | Age: 73
End: 2023-09-07
Payer: COMMERCIAL

## 2023-10-22 ENCOUNTER — MYC MEDICAL ADVICE (OUTPATIENT)
Dept: INTERNAL MEDICINE | Facility: CLINIC | Age: 73
End: 2023-10-22
Payer: COMMERCIAL

## 2023-10-22 DIAGNOSIS — E78.00 PURE HYPERCHOLESTEROLEMIA: ICD-10-CM

## 2023-10-23 ENCOUNTER — OFFICE VISIT (OUTPATIENT)
Dept: AUDIOLOGY | Facility: CLINIC | Age: 73
End: 2023-10-23
Payer: COMMERCIAL

## 2023-10-23 DIAGNOSIS — Z01.10 ENCOUNTER FOR EXAMINATION OF HEARING WITHOUT ABNORMAL FINDINGS: ICD-10-CM

## 2023-10-23 PROCEDURE — 92557 COMPREHENSIVE HEARING TEST: CPT | Performed by: AUDIOLOGIST

## 2023-10-23 PROCEDURE — 92550 TYMPANOMETRY & REFLEX THRESH: CPT | Performed by: AUDIOLOGIST

## 2023-10-23 NOTE — PROGRESS NOTES
AUDIOLOGY REPORT    SUBJECTIVE:  Pamela Engle is a 73 year old female who was seen in the Audiology Clinic at the [facility] for audiologic evaluation, referred by LA Tidwell C.N.P. The patient has been seen previously in this clinic on 10-4-19 for assessment and results indicated normal hearing sensitivity, bilaterally. Patient noted she prefers using closed captioning when watching Malagasy programs on TV, but otherwise denied hearing loss, tinnitus, dizziness, otalgia, otorrhea, recent illness, aural fullness, or history of noise exposure.     OBJECTIVE:  Abuse Screening:  Do you feel unsafe at home or work/school? No  Do you feel threatened by someone? No  Does anyone try to keep you from having contact with others, or doing things outside of your home? No  Physical signs of abuse present? No     Fall Risk Screen:  1. Have you fallen two or more times in the past year? No  2. Have you fallen and had an injury in the past year? Yes    Is patient a fall risk? Yes  Referral initiated: No - primary care provider aware of this history as of 7-11-23.  Fall Risk Assessment Completed by Audiology    Otoscopic exam indicates ears are clear of cerumen, bilaterally.     Pure Tone Thresholds assessed using conventional audiometry with good  reliability from 250-8000 Hz bilaterally using insert earphones and circumaural headphones.     RIGHT:  normal/borderline normal hearing sensitivity for 250-8000 Hz     LEFT:    normal/borderline normal hearing sensitivity for 250-8000 Hz    Tympanogram:    RIGHT: normal/shallow eardrum mobility    LEFT:   normal/shallow eardrum mobility    Reflexes for 1000 Hz (reported by stimulus ear):  RIGHT: Ipsilateral is present at normal levels  RIGHT: Contralateral is present at normal levels  LEFT:   Ipsilateral is present at normal levels  LEFT:   Contralateral is absent at frequencies tested      Speech Reception Threshold:    RIGHT: 15 dB HL    LEFT:   15 dB  HL  Word Recognition Score:     RIGHT: 100% at 55 dB HL using NU-6 recorded word list.    LEFT:   100% at 55 dB HL using NU-6 recorded word list.      ASSESSMENT:     ICD-10-CM    1. Encounter for examination of hearing without abnormal findings  Z01.10 Adult Audiology  Referral          Compared to patient's previous audiogram dated 10-4-19, hearing thresholds have remained stable and hearing sensitivity continues to be normal, bilaterally. Today s results were discussed with the patient in detail.     PLAN:  Ms. Engle should return for hearing testing per medical management or patient concern. Wear hearing protection consistently in noise to preserve current hearing sensitivity and to minimize the effects of noise on tinnitus. Ms. Engle expressed verbal understanding of this information and plan. Please call this clinic with questions regarding these results or recommendations.        Mark Olguin., [facility]  [place] Licensed Audiologist [id number]

## 2023-10-24 RX ORDER — ROSUVASTATIN CALCIUM 10 MG/1
10 TABLET, COATED ORAL EVERY OTHER DAY
Qty: 45 TABLET | Refills: 3 | Status: SHIPPED | OUTPATIENT
Start: 2023-10-24 | End: 2024-09-09

## 2023-10-24 NOTE — TELEPHONE ENCOUNTER
See her Onavo message. She states she takes Crestor every other day.   Pended Rx, with directions change.     She is due for refill, Rx is expiring in epic. Also due for labs.    Recent Labs   Lab Test 11/03/22  0818 09/11/19  0845   CHOL 183 205*   HDL 66 63   LDL 93 99   TRIG 122 217*

## 2023-11-03 ENCOUNTER — PATIENT OUTREACH (OUTPATIENT)
Dept: GASTROENTEROLOGY | Facility: CLINIC | Age: 73
End: 2023-11-03
Payer: COMMERCIAL

## 2024-01-10 ENCOUNTER — TRANSFERRED RECORDS (OUTPATIENT)
Dept: HEALTH INFORMATION MANAGEMENT | Facility: CLINIC | Age: 74
End: 2024-01-10
Payer: COMMERCIAL

## 2024-03-18 ENCOUNTER — MYC MEDICAL ADVICE (OUTPATIENT)
Dept: INTERNAL MEDICINE | Facility: CLINIC | Age: 74
End: 2024-03-18
Payer: COMMERCIAL

## 2024-03-18 DIAGNOSIS — Z78.0 ENCOUNTER FOR OSTEOPOROSIS SCREENING IN ASYMPTOMATIC POSTMENOPAUSAL PATIENT: Primary | ICD-10-CM

## 2024-03-18 DIAGNOSIS — Z13.820 ENCOUNTER FOR OSTEOPOROSIS SCREENING IN ASYMPTOMATIC POSTMENOPAUSAL PATIENT: Primary | ICD-10-CM

## 2024-03-19 NOTE — TELEPHONE ENCOUNTER
Dexa is usually discussed at the annual exam -     We will wait for Maxine Bonner if she wants to wait for next annual or if she considers to order DEXA

## 2024-03-27 ENCOUNTER — ANCILLARY PROCEDURE (OUTPATIENT)
Dept: BONE DENSITY | Facility: CLINIC | Age: 74
End: 2024-03-27
Payer: COMMERCIAL

## 2024-03-27 DIAGNOSIS — Z13.820 ENCOUNTER FOR OSTEOPOROSIS SCREENING IN ASYMPTOMATIC POSTMENOPAUSAL PATIENT: ICD-10-CM

## 2024-03-27 DIAGNOSIS — Z78.0 ENCOUNTER FOR OSTEOPOROSIS SCREENING IN ASYMPTOMATIC POSTMENOPAUSAL PATIENT: ICD-10-CM

## 2024-03-27 PROCEDURE — 77080 DXA BONE DENSITY AXIAL: CPT | Mod: TC | Performed by: PHYSICIAN ASSISTANT

## 2024-03-27 PROCEDURE — 77081 DXA BONE DENSITY APPENDICULR: CPT | Mod: TC | Performed by: PHYSICIAN ASSISTANT

## 2024-06-04 DIAGNOSIS — B00.9 HSV (HERPES SIMPLEX VIRUS) INFECTION: ICD-10-CM

## 2024-06-04 RX ORDER — VALACYCLOVIR HYDROCHLORIDE 1 G/1
TABLET, FILM COATED ORAL
Qty: 20 TABLET | Refills: 1 | Status: SHIPPED | OUTPATIENT
Start: 2024-06-04

## 2024-06-04 NOTE — TELEPHONE ENCOUNTER
Refill request arrived via fax from [facility] pharmacy for    Valacyclovir 1000 mg tablet take two tablets by mouth twice a day for one day as needed for cold sores    Last prescribed 7/11/2023  Last appointment with provider 7/11/2023 with no upcoming appointments

## 2024-06-11 ENCOUNTER — PATIENT OUTREACH (OUTPATIENT)
Dept: CARE COORDINATION | Facility: CLINIC | Age: 74
End: 2024-06-11
Payer: COMMERCIAL

## 2024-06-16 ENCOUNTER — TELEPHONE (OUTPATIENT)
Dept: INTERNAL MEDICINE | Facility: CLINIC | Age: 74
End: 2024-06-16
Payer: COMMERCIAL

## 2024-06-16 DIAGNOSIS — Q78.2 OSTEOPETROSIS: ICD-10-CM

## 2024-06-17 RX ORDER — ALENDRONATE SODIUM 70 MG/1
TABLET ORAL
Qty: 12 TABLET | Refills: 0 | Status: SHIPPED | OUTPATIENT
Start: 2024-06-17 | End: 2024-09-06

## 2024-06-18 ENCOUNTER — MYC MEDICAL ADVICE (OUTPATIENT)
Dept: INTERNAL MEDICINE | Facility: CLINIC | Age: 74
End: 2024-06-18
Payer: COMMERCIAL

## 2024-06-25 ENCOUNTER — PATIENT OUTREACH (OUTPATIENT)
Dept: CARE COORDINATION | Facility: CLINIC | Age: 74
End: 2024-06-25
Payer: COMMERCIAL

## 2024-07-17 ENCOUNTER — PATIENT OUTREACH (OUTPATIENT)
Dept: CARE COORDINATION | Facility: CLINIC | Age: 74
End: 2024-07-17
Payer: COMMERCIAL

## 2024-09-06 DIAGNOSIS — Q78.2 OSTEOPETROSIS: ICD-10-CM

## 2024-09-06 RX ORDER — ALENDRONATE SODIUM 70 MG/1
TABLET ORAL
Qty: 12 TABLET | Refills: 0 | Status: SHIPPED | OUTPATIENT
Start: 2024-09-06 | End: 2024-09-09

## 2024-09-09 ENCOUNTER — OFFICE VISIT (OUTPATIENT)
Dept: INTERNAL MEDICINE | Facility: CLINIC | Age: 74
End: 2024-09-09
Payer: COMMERCIAL

## 2024-09-09 VITALS
WEIGHT: 143 LBS | HEIGHT: 59 IN | OXYGEN SATURATION: 96 % | HEART RATE: 69 BPM | BODY MASS INDEX: 28.83 KG/M2 | SYSTOLIC BLOOD PRESSURE: 116 MMHG | RESPIRATION RATE: 16 BRPM | DIASTOLIC BLOOD PRESSURE: 71 MMHG | TEMPERATURE: 97.1 F

## 2024-09-09 DIAGNOSIS — E78.00 PURE HYPERCHOLESTEROLEMIA: ICD-10-CM

## 2024-09-09 DIAGNOSIS — Z13.29 SCREENING FOR THYROID DISORDER: ICD-10-CM

## 2024-09-09 DIAGNOSIS — M81.6 LOCALIZED OSTEOPOROSIS WITHOUT CURRENT PATHOLOGICAL FRACTURE: ICD-10-CM

## 2024-09-09 DIAGNOSIS — Z00.00 ENCOUNTER FOR ANNUAL WELLNESS EXAM IN MEDICARE PATIENT: Primary | ICD-10-CM

## 2024-09-09 DIAGNOSIS — Z13.0 SCREENING FOR IRON DEFICIENCY ANEMIA: ICD-10-CM

## 2024-09-09 DIAGNOSIS — N62 LARGE BREASTS: ICD-10-CM

## 2024-09-09 DIAGNOSIS — E78.2 MIXED HYPERLIPIDEMIA: ICD-10-CM

## 2024-09-09 DIAGNOSIS — Z13.1 SCREENING FOR DIABETES MELLITUS: ICD-10-CM

## 2024-09-09 DIAGNOSIS — Z87.891 PERSONAL HISTORY OF TOBACCO USE: ICD-10-CM

## 2024-09-09 DIAGNOSIS — E55.9 VITAMIN D DEFICIENCY: ICD-10-CM

## 2024-09-09 PROCEDURE — 99213 OFFICE O/P EST LOW 20 MIN: CPT | Mod: 25

## 2024-09-09 PROCEDURE — G0439 PPPS, SUBSEQ VISIT: HCPCS

## 2024-09-09 RX ORDER — ALENDRONATE SODIUM 70 MG/1
TABLET ORAL
Qty: 12 TABLET | Refills: 3 | Status: SHIPPED | OUTPATIENT
Start: 2024-09-09

## 2024-09-09 RX ORDER — ROSUVASTATIN CALCIUM 10 MG/1
10 TABLET, COATED ORAL EVERY OTHER DAY
Qty: 45 TABLET | Refills: 3 | Status: SHIPPED | OUTPATIENT
Start: 2024-09-09

## 2024-09-09 NOTE — PROGRESS NOTES
Preventive Care Visit  [facility]  LA Talley CNP, Internal Medicine  Sep 9, 2024  {Provider  Link to SmartSet :909512}    {PROVIDER CHARTING PREFERENCE:738224}    Subjective   Danni is a 74 year old, presenting for the following:  Wellness Visit        9/9/2024     1:13 PM   Additional Questions   Roomed by Charmaine     {ROOMER if patient is in their first year of Medicare a vision screen is required click here to document the Vison screen and then refresh the note to pull in results  :763550}    Health Care Directive  Patient has a Health Care Directive on file  Discussed advance care planning with patient.    HPI  ***  {MA/LPN/RN Pre-Provider Visit Orders- hCG/UA/Strep (Optional):908815}  {SUPERLIST (Optional):013109}  {additonal problems for provider to add (Optional):966758}      9/7/2024   General Health   How would you rate your overall physical health? Good   Feel stress (tense, anxious, or unable to sleep) Not at all            9/7/2024   Nutrition   Diet: Regular (no restrictions)            9/7/2024   Exercise   Days per week of moderate/strenous exercise 3 days   Average minutes spent exercising at this level 20 min            9/7/2024   Social Factors   Frequency of gathering with friends or relatives Twice a week   Worry food won't last until get money to buy more No   Food not last or not have enough money for food? No   Do you have housing? (Housing is defined as stable permanent housing and does not include staying ouside in a car, in a tent, in an abandoned building, in an overnight shelter, or couch-surfing.) Yes   Are you worried about losing your housing? No   Lack of transportation? No   Unable to get utilities (heat,electricity)? No            9/8/2024   Fall Risk   Fallen 2 or more times in the past year? No   Trouble with walking or balance? No             9/7/2024   Activities of Daily Living- Home Safety   Needs help with the following daily activites None of  the above   Safety concerns in the home None of the above            2024   Dental   Dentist two times every year? Yes            2024   Hearing Screening   Hearing concerns? (!) IT'S HARD TO FOLLOW A CONVERSATION IN A NOISY RESTAURANT OR CROWDED ROOM.            2024   Driving Risk Screening   Patient/family members have concerns about driving No            2024   General Alertness/Fatigue Screening   Have you been more tired than usual lately? No            2024   Urinary Incontinence Screening   Bothered by leaking urine in past 6 months No            2024   TB Screening   Were you born outside of the [place]? No            Today's PHQ-2 Score:       2024    10:43 AM   PHQ-2 (  [employer])   Q1: Little interest or pleasure in doing things 0   Q2: Feeling down, depressed or hopeless 0   PHQ-2 Score 0   Q1: Little interest or pleasure in doing things Not at all   Q2: Feeling down, depressed or hopeless Not at all   PHQ-2 Score 0           2024   Substance Use   Alcohol more than 3/day or more than 7/wk No   Do you have a current opioid prescription? No   How severe/bad is pain from 1 to 10? /10   Do you use any other substances recreationally? No        Social History     Tobacco Use    Smoking status: Former     Current packs/day: 0.00     Average packs/day: 1 pack/day for 41.1 years (41.1 ttl pk-yrs)     Types: Cigarettes     Start date: 1969     Quit date: 2010     Years since quittin.6    Smokeless tobacco: Never   Vaping Use    Vaping status: Never Used   Substance Use Topics    Alcohol use: Yes     Alcohol/week: 2.0 standard drinks of alcohol     Comment: 1-2 drinks per week    Drug use: No     {Provider  If there are gaps in the social history shown above, please follow the link to update and then refresh the note Link to Social and Substance History :252484}      2023   LAST FHS-7 RESULTS   1st degree relative breast or ovarian cancer No   Any relative  bilateral breast cancer No   Any male have breast cancer No   Any ONE woman have BOTH breast AND ovarian cancer No   Any woman with breast cancer before 50yrs No   2 or more relatives with breast AND/OR ovarian cancer No   2 or more relatives with breast AND/OR bowel cancer Yes        {If any of the questions to the FHS7 are answered yes, consider referral for genetic counseling.    Additional indications for genetic referral include personal history of breast or ovarian cancer, genetic mutation in 1st degree relative which increases risk of breast cancer including BRCA1, BRCA2, MAGDALENA, PALB 2, TP53, CHEK2, PTEN, CDH1, STK11 (per ACS) and/or 1st degree relative with history of pancreatic or high-risk prostate cancer (per NCCN):380526}   {Mammogram Decision Support (Optional):000622}    ASCVD Risk   The 10-year ASCVD risk score (Cruz POSEY, et al., 2019) is: 10.6%    Values used to calculate the score:      Age: 74 years      Sex: Female      Is Non- : No      Diabetic: No      Tobacco smoker: No      Systolic Blood Pressure: 116 mmHg      Is BP treated: No      HDL Cholesterol: 66 mg/dL      Total Cholesterol: 183 mg/dL    {Link to Fracture Risk Assessment Tool (Optional):663974}    {Provider  REQUIRED FOR AWV Use the storyboard to review patient history, after sections have been marked as reviewed, refresh note to capture documentation:685987}  {Provider   REQUIRED AWV use this link to review and update sexual activity history  after section has been marked as reviewed, refresh note to capture documentation:896679}  Reviewed and updated as needed this visit by Provider                    {HISTORY OPTIONS (Optional):743141}  Current providers sharing in care for this patient include:  Patient Care Team:  Maxine Bonner APRN CNP as PCP - General (Internal Medicine)  Lexis Suarez AuD as Audiologist (Audiology)  Maxine Bonner APRN CNP as Assigned PCP  Delilah Begum PA-C as  "Physician Assistant (Physician Assistant - Medical)    The following health maintenance items are reviewed in Epic and correct as of today:  Health Maintenance   Topic Date Due    RSV VACCINE (1 - 1-dose 60+ series) Never done    LIPID  11/03/2023    MEDICARE ANNUAL WELLNESS VISIT  07/11/2024    ANNUAL REVIEW OF HM ORDERS  07/11/2024    LUNG CANCER SCREENING  07/28/2024    INFLUENZA VACCINE (1) 09/01/2024    COVID-19 Vaccine (6 - 2023-24 season) 09/01/2024    COLORECTAL CANCER SCREENING  10/25/2024    MAMMO SCREENING  01/20/2025    FALL RISK ASSESSMENT  09/09/2025    GLUCOSE  04/06/2026    ADVANCE CARE PLANNING  07/11/2028    DTAP/TDAP/TD IMMUNIZATION (3 - Td or Tdap) 07/21/2032    DEXA  03/27/2039    HEPATITIS C SCREENING  Completed    PHQ-2 (once per calendar year)  Completed    Pneumococcal Vaccine: 65+ Years  Completed    ZOSTER IMMUNIZATION  Completed    HPV IMMUNIZATION  Aged Out    MENINGITIS IMMUNIZATION  Aged Out    RSV MONOCLONAL ANTIBODY  Aged Out       {ROS Picklists (Optional):029516}     Objective    Exam  /71   Pulse 69   Temp 97.1  F (36.2  C) (Tympanic)   Resp 16   Ht 1.492 m (4' 10.75\")   Wt 64.9 kg (143 lb)   SpO2 96%   BMI 29.13 kg/m     Estimated body mass index is 29.13 kg/m  as calculated from the following:    Height as of this encounter: 1.492 m (4' 10.75\").    Weight as of this encounter: 64.9 kg (143 lb).    Physical Exam  {Exam Choices (Optional):660273}  {Provider  The Mini-Cog is incomplete, use link to complete and refresh note Link to Mini-Cog :254352}       No data to display              {A Mini-Cog total score of 0-2 suggests the possibility of dementia, score of 3-5 suggests no dementia:348784}         Signed Electronically by: LA Talley CNP  {Email feedback regarding this note to [email or web address]   :899562}  "

## 2024-09-09 NOTE — PROGRESS NOTES
"Preventive Care Visit  [facility]  LA Talley CNP, Internal Medicine  Sep 9, 2024      Assessment & Plan     (Z00.00) Encounter for annual wellness exam in Medicare patient  (primary encounter diagnosis)  Comment: Annual visit   Plan:     (E78.2) Mixed hyperlipidemia  Comment:   Plan: Lipid panel reflex to direct LDL Fasting            (Z13.1) Screening for diabetes mellitus  Comment:   Plan: Hemoglobin A1c, Basic metabolic panel  (Ca, Cl,        CO2, Creat, Gluc, K, Na, BUN)            (Z13.29) Screening for thyroid disorder  Comment:   Plan: TSH with free T4 reflex            (Z13.0) Screening for iron deficiency anemia  Comment:   Plan: CBC with platelets            (E55.9) Vitamin D deficiency  Comment:   Plan: Vitamin D Deficiency            (Z87.891) Personal history of tobacco use  Comment:   Plan: CT Chest Lung Cancer Scrn Low Dose wo            (E78.00) Pure hypercholesterolemia  Comment:   Plan: rosuvastatin (CRESTOR) 10 MG tablet            (M81.6) Localized osteoporosis without current pathological fracture  Comment: She was started on Fosamax in 2019 for Most recent DXA done in March of 2024 showed T score of left forearm radius of -2.7. Thus, the decision was made to treat for an additional 2 years with Fosamax and repeat DXA in 2026.   Plan: alendronate (FOSAMAX) 70 MG tablet            (N62) Large breasts  Comment: Interested in breast reduction- will send referral to plastic surgery  Plan: Adult Plastic Surgery  Referral                BMI  Estimated body mass index is 29.13 kg/m  as calculated from the following:    Height as of this encounter: 1.492 m (4' 10.75\").    Weight as of this encounter: 64.9 kg (143 lb).       Counseling  Appropriate preventive services were addressed with this patient via screening, questionnaire, or discussion as appropriate for fall prevention, nutrition, physical activity, Tobacco-use cessation, social engagement, weight loss " and cognition.  Checklist reviewing preventive services available has been given to the patient.  Reviewed patient's diet, addressing concerns and/or questions.   She is at risk for lack of exercise and has been provided with information to increase physical activity for the benefit of her well-being.   The patient was provided with written information regarding signs of hearing loss.             Subjective   Danni is a 74 year old, presenting for the following:  Wellness Visit        9/9/2024     1:13 PM   Additional Questions   Roomed by Charmaine         Health Care Directive  Patient has a Health Care Directive on file  Discussed advance care planning with patient.    HPI              9/7/2024   General Health   How would you rate your overall physical health? Good   Feel stress (tense, anxious, or unable to sleep) Not at all            9/7/2024   Nutrition   Diet: Regular (no restrictions)            9/7/2024   Exercise   Days per week of moderate/strenous exercise 3 days   Average minutes spent exercising at this level 20 min            9/7/2024   Social Factors   Frequency of gathering with friends or relatives Twice a week   Worry food won't last until get money to buy more No   Food not last or not have enough money for food? No   Do you have housing? (Housing is defined as stable permanent housing and does not include staying ouside in a car, in a tent, in an abandoned building, in an overnight shelter, or couch-surfing.) Yes   Are you worried about losing your housing? No   Lack of transportation? No   Unable to get utilities (heat,electricity)? No            9/8/2024   Fall Risk   Fallen 2 or more times in the past year? No   Trouble with walking or balance? No             9/7/2024   Activities of Daily Living- Home Safety   Needs help with the following daily activites None of the above   Safety concerns in the home None of the above            9/7/2024   Dental   Dentist two times every year? Yes             2024   Hearing Screening   Hearing concerns? (!) IT'S HARD TO FOLLOW A CONVERSATION IN A NOISY RESTAURANT OR CROWDED ROOM.            2024   Driving Risk Screening   Patient/family members have concerns about driving No            2024   General Alertness/Fatigue Screening   Have you been more tired than usual lately? No            2024   Urinary Incontinence Screening   Bothered by leaking urine in past 6 months No            2024   TB Screening   Were you born outside of the [place]? No            Today's PHQ-2 Score:       2024    10:43 AM   PHQ-2 (  [employer])   Q1: Little interest or pleasure in doing things 0   Q2: Feeling down, depressed or hopeless 0   PHQ-2 Score 0   Q1: Little interest or pleasure in doing things Not at all   Q2: Feeling down, depressed or hopeless Not at all   PHQ-2 Score 0           2024   Substance Use   Alcohol more than 3/day or more than 7/wk No   Do you have a current opioid prescription? No   How severe/bad is pain from 1 to 10? 1/10   Do you use any other substances recreationally? No        Social History     Tobacco Use    Smoking status: Former     Current packs/day: 0.00     Average packs/day: 1 pack/day for 41.1 years (41.1 ttl pk-yrs)     Types: Cigarettes     Start date: 1969     Quit date: 2010     Years since quittin.6    Smokeless tobacco: Never   Vaping Use    Vaping status: Never Used   Substance Use Topics    Alcohol use: Yes     Alcohol/week: 2.0 standard drinks of alcohol     Comment: 1-2 drinks per week    Drug use: No           2023   LAST FHS-7 RESULTS   1st degree relative breast or ovarian cancer No   Any relative bilateral breast cancer No   Any male have breast cancer No   Any ONE woman have BOTH breast AND ovarian cancer No   Any woman with breast cancer before 50yrs No   2 or more relatives with breast AND/OR ovarian cancer No   2 or more relatives with breast AND/OR bowel cancer Yes           Mammogram Screening -  "Mammogram every 1-2 years updated in Health Maintenance based on mutual decision making    ASCVD Risk   The 10-year ASCVD risk score (Cruz POSEY, et al., 2019) is: 10.6%    Values used to calculate the score:      Age: 74 years      Sex: Female      Is Non- : No      Diabetic: No      Tobacco smoker: No      Systolic Blood Pressure: 116 mmHg      Is BP treated: No      HDL Cholesterol: 66 mg/dL      Total Cholesterol: 183 mg/dL            Reviewed and updated as needed this visit by Provider                      Current providers sharing in care for this patient include:  Patient Care Team:  Maxine Bonner APRN CNP as PCP - General (Internal Medicine)  Lexis Suarez AuD as Audiologist (Audiology)  Maxine Bonner APRN CNP as Assigned PCP  Delilah Begum PA-C as Physician Assistant (Physician Assistant - Medical)    The following health maintenance items are reviewed in Epic and correct as of today:  Health Maintenance   Topic Date Due    RSV VACCINE (1 - 1-dose 60+ series) Never done    LIPID  11/03/2023    MEDICARE ANNUAL WELLNESS VISIT  07/11/2024    ANNUAL REVIEW OF HM ORDERS  07/11/2024    LUNG CANCER SCREENING  07/28/2024    INFLUENZA VACCINE (1) 09/01/2024    COVID-19 Vaccine (6 - 2023-24 season) 09/01/2024    COLORECTAL CANCER SCREENING  10/25/2024    MAMMO SCREENING  01/20/2025    FALL RISK ASSESSMENT  09/09/2025    GLUCOSE  04/06/2026    ADVANCE CARE PLANNING  09/09/2029    DTAP/TDAP/TD IMMUNIZATION (3 - Td or Tdap) 07/21/2032    DEXA  03/27/2039    HEPATITIS C SCREENING  Completed    PHQ-2 (once per calendar year)  Completed    Pneumococcal Vaccine: 65+ Years  Completed    ZOSTER IMMUNIZATION  Completed    HPV IMMUNIZATION  Aged Out    MENINGITIS IMMUNIZATION  Aged Out    RSV MONOCLONAL ANTIBODY  Aged Out            Objective    Exam  /71   Pulse 69   Temp 97.1  F (36.2  C) (Tympanic)   Resp 16   Ht 1.492 m (4' 10.75\")   Wt 64.9 kg (143 lb)   SpO2 96%   BMI " "29.13 kg/m     Estimated body mass index is 29.13 kg/m  as calculated from the following:    Height as of this encounter: 1.492 m (4' 10.75\").    Weight as of this encounter: 64.9 kg (143 lb).    Physical Exam  Constitutional:       Appearance: Normal appearance.   HENT:      Head: Normocephalic and atraumatic.   Eyes:      Conjunctiva/sclera: Conjunctivae normal.   Cardiovascular:      Rate and Rhythm: Normal rate and regular rhythm.      Heart sounds: Murmur heard.   Pulmonary:      Effort: Pulmonary effort is normal.      Breath sounds: Normal breath sounds.   Skin:     General: Skin is warm and dry.   Neurological:      Mental Status: She is alert and oriented to person, place, and time.   Psychiatric:         Mood and Affect: Mood normal.         Behavior: Behavior normal.         Thought Content: Thought content normal.         Judgment: Judgment normal.                   9/9/2024   Mini Cog   Clock Draw Score 2 Normal   3 Item Recall 3 objects recalled   Mini Cog Total Score 5                 Signed Electronically by: LA Talley CNP    "

## 2024-09-12 ENCOUNTER — LAB (OUTPATIENT)
Dept: LAB | Facility: CLINIC | Age: 74
End: 2024-09-12
Payer: COMMERCIAL

## 2024-09-12 DIAGNOSIS — Z13.29 SCREENING FOR THYROID DISORDER: ICD-10-CM

## 2024-09-12 DIAGNOSIS — E55.9 VITAMIN D DEFICIENCY: ICD-10-CM

## 2024-09-12 DIAGNOSIS — Z13.0 SCREENING FOR IRON DEFICIENCY ANEMIA: ICD-10-CM

## 2024-09-12 DIAGNOSIS — Z13.1 SCREENING FOR DIABETES MELLITUS: ICD-10-CM

## 2024-09-12 DIAGNOSIS — E78.2 MIXED HYPERLIPIDEMIA: ICD-10-CM

## 2024-09-12 LAB
ANION GAP SERPL CALCULATED.3IONS-SCNC: 8 MMOL/L (ref 7–15)
BUN SERPL-MCNC: 14.8 MG/DL (ref 8–23)
CALCIUM SERPL-MCNC: 9.3 MG/DL (ref 8.8–10.4)
CHLORIDE SERPL-SCNC: 106 MMOL/L (ref 98–107)
CHOLEST SERPL-MCNC: 202 MG/DL
CREAT SERPL-MCNC: 0.74 MG/DL (ref 0.51–0.95)
EGFRCR SERPLBLD CKD-EPI 2021: 85 ML/MIN/1.73M2
ERYTHROCYTE [DISTWIDTH] IN BLOOD BY AUTOMATED COUNT: 13.6 % (ref 10–15)
FASTING STATUS PATIENT QL REPORTED: YES
FASTING STATUS PATIENT QL REPORTED: YES
GLUCOSE SERPL-MCNC: 88 MG/DL (ref 70–99)
HBA1C MFR BLD: 5.4 % (ref 0–5.6)
HCO3 SERPL-SCNC: 28 MMOL/L (ref 22–29)
HCT VFR BLD AUTO: 39.4 % (ref 35–47)
HDLC SERPL-MCNC: 68 MG/DL
HGB BLD-MCNC: 12.9 G/DL (ref 11.7–15.7)
LDLC SERPL CALC-MCNC: 111 MG/DL
MCH RBC QN AUTO: 29.8 PG (ref 26.5–33)
MCHC RBC AUTO-ENTMCNC: 32.7 G/DL (ref 31.5–36.5)
MCV RBC AUTO: 91 FL (ref 78–100)
NONHDLC SERPL-MCNC: 134 MG/DL
PLATELET # BLD AUTO: 308 10E3/UL (ref 150–450)
POTASSIUM SERPL-SCNC: 4.7 MMOL/L (ref 3.4–5.3)
RBC # BLD AUTO: 4.33 10E6/UL (ref 3.8–5.2)
SODIUM SERPL-SCNC: 142 MMOL/L (ref 135–145)
TRIGL SERPL-MCNC: 113 MG/DL
TSH SERPL DL<=0.005 MIU/L-ACNC: 1.98 UIU/ML (ref 0.3–4.2)
VIT D+METAB SERPL-MCNC: 53 NG/ML (ref 20–50)
WBC # BLD AUTO: 5.7 10E3/UL (ref 4–11)

## 2024-09-12 PROCEDURE — 85027 COMPLETE CBC AUTOMATED: CPT

## 2024-09-12 PROCEDURE — 80048 BASIC METABOLIC PNL TOTAL CA: CPT

## 2024-09-12 PROCEDURE — 84443 ASSAY THYROID STIM HORMONE: CPT

## 2024-09-12 PROCEDURE — 80061 LIPID PANEL: CPT

## 2024-09-12 PROCEDURE — 36415 COLL VENOUS BLD VENIPUNCTURE: CPT

## 2024-09-12 PROCEDURE — 82306 VITAMIN D 25 HYDROXY: CPT

## 2024-09-12 PROCEDURE — 83036 HEMOGLOBIN GLYCOSYLATED A1C: CPT

## 2024-09-24 ENCOUNTER — TRANSFERRED RECORDS (OUTPATIENT)
Dept: HEALTH INFORMATION MANAGEMENT | Facility: CLINIC | Age: 74
End: 2024-09-24
Payer: COMMERCIAL

## 2024-09-25 ENCOUNTER — PATIENT OUTREACH (OUTPATIENT)
Dept: CARE COORDINATION | Facility: CLINIC | Age: 74
End: 2024-09-25
Payer: COMMERCIAL

## 2024-09-27 ENCOUNTER — PATIENT OUTREACH (OUTPATIENT)
Dept: GASTROENTEROLOGY | Facility: CLINIC | Age: 74
End: 2024-09-27
Payer: COMMERCIAL

## 2024-09-27 DIAGNOSIS — Z12.11 SPECIAL SCREENING FOR MALIGNANT NEOPLASMS, COLON: Primary | ICD-10-CM

## 2024-09-27 NOTE — PROGRESS NOTES
"Patients last colonoscopy on file was on 10/25/21 and was recommended to repeat in 3 years.  CRC Screening Colonoscopy Referral Review    Patient meets the inclusion criteria for screening colonoscopy standing order.    Ordering/Referring Provider:  Maxine Bonner      BMI: Estimated body mass index is 29.13 kg/m  as calculated from the following:    Height as of 9/9/24: 1.492 m (4' 10.75\").    Weight as of 9/9/24: 64.9 kg (143 lb).     Sedation:  Does patient have any of the following conditions affecting sedation?  No medical conditions affecting sedation.    Previous Scopes:  Any previous recommendations or follow up needs based on previous scope?  na / No recommendations.    Medical Concerns to Postpone Order:  Does patient have any of the following medical concerns that should postpone/delay colonoscopy referral?  No medical conditions affecting colonoscopy referral.    Final Referral Details:  Based on patient's medical history patient is appropriate for referral order with moderate sedation. If patient's BMI > 50 do not schedule in ASC.  "

## 2024-09-30 ENCOUNTER — HOSPITAL ENCOUNTER (OUTPATIENT)
Dept: CT IMAGING | Facility: CLINIC | Age: 74
Discharge: HOME | End: 2024-09-30
Payer: COMMERCIAL

## 2024-09-30 DIAGNOSIS — Z87.891 PERSONAL HISTORY OF TOBACCO USE: ICD-10-CM

## 2024-09-30 PROCEDURE — 71271 CT THORAX LUNG CANCER SCR C-: CPT

## 2024-10-01 ENCOUNTER — TELEPHONE (OUTPATIENT)
Dept: INTERNAL MEDICINE | Facility: CLINIC | Age: 74
End: 2024-10-01
Payer: COMMERCIAL

## 2024-10-01 NOTE — TELEPHONE ENCOUNTER
Call from rad with significant incidental finding on Chest CT    2. Significant Incidental Finding(s):  Category S: Yes. coronary  artery calcium moderate or severe    Mary Marsh RN

## 2024-10-28 ENCOUNTER — TRANSFERRED RECORDS (OUTPATIENT)
Dept: HEALTH INFORMATION MANAGEMENT | Facility: CLINIC | Age: 74
End: 2024-10-28
Payer: COMMERCIAL

## 2024-11-04 ENCOUNTER — HOSPITAL ENCOUNTER (OUTPATIENT)
Dept: CARDIOLOGY | Facility: CLINIC | Age: 74
Discharge: HOME | End: 2024-11-04
Attending: INTERNAL MEDICINE | Admitting: INTERNAL MEDICINE
Payer: COMMERCIAL

## 2024-11-04 DIAGNOSIS — I35.0 NONRHEUMATIC AORTIC VALVE STENOSIS: ICD-10-CM

## 2024-11-04 LAB — BI-PLANE LVEF ECHO: NORMAL

## 2024-11-04 PROCEDURE — 93306 TTE W/DOPPLER COMPLETE: CPT

## 2024-11-04 PROCEDURE — 93306 TTE W/DOPPLER COMPLETE: CPT | Mod: 26 | Performed by: INTERNAL MEDICINE

## 2024-11-07 NOTE — PROGRESS NOTES
HEART CARE ENCOUNTER NOTE      [facility]  [phone]      Assessment/Recommendations   Assessment:   Aortic stenosis: Moderate to severe as seen on recent echo 11/4/2024.  Per the echocardiogram reader it was felt to be closer to severe.  Patient is currently asymptomatic denying lower extremity edema, fatigue, shortness of breath.  Discussed repeating an echocardiogram in 6 months versus discussing with valve team about what valve replacement would look like- TAVR versus surgery.  Will have patient see valve team to have a plan moving forward.  Hypertension: Well-controlled without medications  Hyperlipidemia: On rosuvastatin 10 mg every other day.  Last lipids 9/12/2024 showed LDL of 111      Plan:   Increase rosuvastatin to 10 mg daily instead of every other day  Recheck fasting cholesterol in 3 months  Patient will see valve team to discuss her moderate to severe aortic stenosis      Follow up in 6 months with new general cardiologist (previous Dr. Hernandez patient)     History of Present Illness/Subjective    HPI: Pamela Engle is a 74 year old female with PMHx of hyperlipidemia, aortic stenosis presents for follow-up.  Patient last saw Dr. Hernandez 10/29/2021 where patient had been active denying any anginal symptoms.  Patient had noted her activities were improved than they were previously.  Patient's blood pressure at visit was elevated in the 140s systolic.  There was discussion about low-dose lisinopril which patient had declined and wanted to focus on decreasing sodium intake.  Recent echocardiogram was obtained 11/4/2024 that showed moderate to severe aortic stenosis though felt it was closer to severe than the gradient and velocity where showing due to nonparallel angle of interrogation.     Patient notes she has been doing well and denies any major concerns.  Inquiring about next steps regarding her aortic valve.  Patient notes she is active golfing and playing [employer] ball and  denies any exertional angina or dyspnea.  Patient denies fatigue, lightheadedness, palpitations, lower extremity edema.  Patient typically goes to [place] for the winter though notes this is very flexible.          Echocardiogram 11/4/24 results:  The left ventricle is normal in size.  Biplane LVEF is 57%.  No regional wall motion abnormalities noted.  Moderate to severe valvular aortic stenosis. SABRINA 1.1 cm^2, Peak Michael 3.1 m/s,  Mean gradient 24 mmHg, DI 0.29  RVSP 26mmHg  Compared to the prior study dated 10/31/2022 no signficant change, though  suspect the AS is closer to severe than suggested by gradient and velocity due  to nonparallel angle of interrogation on both studies     Physical Examination  Review of Systems   Vitals: /78 (BP Location: Right arm, Patient Position: Sitting, Cuff Size: Adult Regular)   Pulse 60   Resp 14   Wt 68 kg (150 lb)   BMI 30.55 kg/m    BMI= Body mass index is 30.55 kg/m .  Wt Readings from Last 3 Encounters:   11/11/24 68 kg (150 lb)   09/09/24 64.9 kg (143 lb)   07/11/23 62.1 kg (137 lb)           ENT/Mouth: membranes moist, no oral lesions or bleeding gums.      EYES:  no scleral icterus, normal conjunctivae       Chest/Lungs:   lungs are clear to auscultation, no rales or wheezing,  equal chest wall expansion    Cardiovascular:   Regular. Normal first and second heart sounds with 3 out of 6 systolic murmur, no rubs, or gallops; the carotid, radial and posterior tibial pulses are intact, no edema bilaterally        Extremities: no cyanosis or clubbing   Skin: no xanthelasma, warm.    Neurologic: no tremors     Psychiatric: alert and oriented x3, calm        Please refer above for cardiac ROS details.        Medical History  Surgical History Family History Social History   Past Medical History:   Diagnosis Date    Anemia 2012    after surgery    Aortic stenosis     cardiology visit follow up due Fall of 2021    Arthritis     Complication of anesthesia      Gastroesophageal reflux disease     rarely    Hepatitis     hepatitis A as a child    History of anesthesia complications     Tremors after surgery     Primary insomnia 10/5/2020     Past Surgical History:   Procedure Laterality Date    ARTHROSCOPY KNEE Left 2003    ARTHROSCOPY KNEE Right 2010    BIOPSY  2001    bone marrow donator    BUNIONECTOMY PORTER Bilateral     and hammer toe surgery    ENT SURGERY  's    EXCISE MASS TRUNK N/A 2019    Procedure: EXCISION SUBCUTANEOUS MASS UPPER CHEST MIDLINE;  Surgeon: Karla Vasquez MD;  Location: [facility] OR    EXCISE CASTANO'S NEUROMA FOOT  2007 and  redo 2009    GYN SURGERY  1984    tubal ligation    ORTHOPEDIC SURGERY Right 2019    rotator cuff repair    SOFT TISSUE SURGERY  2012    lipoma removed from back     Family History   Problem Relation Age of Onset    CABG Mother 77    Arthritis Mother     Kidney Disease Mother     Pacemaker Mother     Colon Cancer Father     Hypertension Father     Hyperlipidemia Father     Prostate Cancer Father     Dementia Father     CABG Father     CABG Sister 58    Diabetes Sister     Coronary Stenting Sister 63        2 stents    Substance Abuse Brother     Myocardial Infarction Brother     CABG Brother     Liver Cancer Maternal Grandmother     Breast Cancer Paternal Grandmother     Lung Cancer Paternal Grandfather         Social History     Socioeconomic History    Marital status:      Spouse name: Not on file    Number of children: Not on file    Years of education: Not on file    Highest education level: Not on file   Occupational History    Not on file   Tobacco Use    Smoking status: Former     Current packs/day: 0.00     Average packs/day: 1 pack/day for 41.1 years (41.1 ttl pk-yrs)     Types: Cigarettes     Start date: 1969     Quit date: 2010     Years since quittin.7    Smokeless tobacco: Never   Vaping Use    Vaping status: Never Used   Substance and Sexual Activity    Alcohol use: Yes      Alcohol/week: 2.0 standard drinks of alcohol     Comment: 1-2 drinks per week    Drug use: No    Sexual activity: Not Currently   Other Topics Concern    Not on file   Social History Narrative    Not on file     Social Drivers of Health     Financial Resource Strain: Low Risk  (9/7/2024)    Financial Resource Strain     Within the past 12 months, have you or your family members you live with been unable to get utilities (heat, electricity) when it was really needed?: No   Food Insecurity: No Food Insecurity (10/2/2024)    Received from [facility]    Hunger Vital Sign     Worried About Running Out of Food in the Last Year: Never true     Ran Out of Food in the Last Year: Never true   Transportation Needs: No Transportation Needs (10/2/2024)    Received from [facility]    PRAPARE - Transportation     Lack of Transportation (Medical): No     Lack of Transportation (Non-Medical): No   Physical Activity: Insufficiently Active (10/2/2024)    Received from [facility]    Exercise Vital Sign     Days of Exercise per Week: 2 days     Minutes of Exercise per Session: 20 min   Stress: No Stress Concern Present (9/7/2024)    [employer] [place] [employer] - Occupational Stress Questionnaire     Feeling of Stress : Not at all   Social Connections: Unknown (9/7/2024)    Social Connection and Isolation Panel [NHANES]     Frequency of Communication with Friends and Family: Not on file     Frequency of Social Gatherings with Friends and Family: Twice a week     Attends Baptism Services: Not on file     Active Member of Clubs or Organizations: Not on file     Attends Club or Organization Meetings: Not on file     Marital Status: Not on file   Interpersonal Safety: Not on file   Housing Stability: Low Risk  (10/2/2024)    Received from [facility]    Housing Stability     What is your living situation today?: I have a steady place to live           Medications  Allergies   Current Outpatient Medications   Medication Sig  "Dispense Refill    alendronate (FOSAMAX) 70 MG tablet TAKE 1 TABLET (70 MG TOTAL) BY MOUTH EVERY 7 DAYS. 12 tablet 3    Calcium-Magnesium-Zinc (HUGO-MAG-ZINC OR) Take 500 mg by mouth 2 times daily      Cholecalciferol (VITAMIN D3) 50 MCG (2000 UT) CAPS Take by mouth daily      ibuprofen (ADVIL/MOTRIN) 200 MG tablet Take 3 tablets (600 mg) by mouth every 6 hours as needed for pain 50 tablet 0    Multiple Vitamins-Minerals (HAIR SKIN AND NAILS FORMULA) TABS Take by mouth daily      Multiple Vitamins-Minerals (MULTIVITAMIN PO) Take by mouth daily      rosuvastatin (CRESTOR) 10 MG tablet Take 1 tablet (10 mg) by mouth every other day. 45 tablet 3    valACYclovir (VALTREX) 1000 mg tablet TAKE TWO TABLETS BY MOUTH TWICE A DAY FOR ONE DAY AS NEEDED FOR COLD SORES 20 tablet 1       Allergies   Allergen Reactions    Hydrocodone Swelling          Lab Results    Chemistry/lipid CBC Cardiac Enzymes/BNP/TSH/INR   Recent Labs   Lab Test 09/12/24  1127   CHOL 202*   HDL 68   *   TRIG 113     Recent Labs   Lab Test 09/12/24  1127 11/03/22  0818 09/11/19  0845   * 93 99     Recent Labs   Lab Test 09/12/24  1127      POTASSIUM 4.7   CHLORIDE 106   CO2 28   GLC 88   BUN 14.8   CR 0.74   GFRESTIMATED 85   HUGO 9.3     Recent Labs   Lab Test 09/12/24  1127 04/06/23  1327 07/18/22  1402   CR 0.74 0.67 0.83     Recent Labs   Lab Test 09/12/24  1127 02/09/22  1400 01/13/21  1416   A1C 5.4 5.6 5.8*          Recent Labs   Lab Test 09/12/24  1127   WBC 5.7   HGB 12.9   HCT 39.4   MCV 91        Recent Labs   Lab Test 09/12/24  1127 04/06/23  1327 07/18/22  1402   HGB 12.9 12.2 12.3    No results for input(s): \"TROPONINI\" in the last 17458 hours.  No results for input(s): \"BNP\", \"NTBNPI\", \"NTBNP\" in the last 79971 hours.  Recent Labs   Lab Test 09/12/24  1127   TSH 1.98     No results for input(s): \"INR\" in the last 13163 hours.       Delilah Begum PA-C               "

## 2024-11-11 ENCOUNTER — TELEPHONE (OUTPATIENT)
Dept: CARDIOLOGY | Facility: CLINIC | Age: 74
End: 2024-11-11

## 2024-11-11 ENCOUNTER — OFFICE VISIT (OUTPATIENT)
Dept: CARDIOLOGY | Facility: CLINIC | Age: 74
End: 2024-11-11
Payer: COMMERCIAL

## 2024-11-11 VITALS
SYSTOLIC BLOOD PRESSURE: 129 MMHG | DIASTOLIC BLOOD PRESSURE: 78 MMHG | BODY MASS INDEX: 30.55 KG/M2 | WEIGHT: 150 LBS | RESPIRATION RATE: 14 BRPM | HEART RATE: 60 BPM

## 2024-11-11 DIAGNOSIS — I35.0 NONRHEUMATIC AORTIC VALVE STENOSIS: Primary | ICD-10-CM

## 2024-11-11 DIAGNOSIS — E78.00 PURE HYPERCHOLESTEROLEMIA: ICD-10-CM

## 2024-11-11 PROCEDURE — G2211 COMPLEX E/M VISIT ADD ON: HCPCS | Performed by: STUDENT IN AN ORGANIZED HEALTH CARE EDUCATION/TRAINING PROGRAM

## 2024-11-11 PROCEDURE — 99204 OFFICE O/P NEW MOD 45 MIN: CPT | Performed by: STUDENT IN AN ORGANIZED HEALTH CARE EDUCATION/TRAINING PROGRAM

## 2024-11-11 NOTE — TELEPHONE ENCOUNTER
===View-only below this line===  ----- Message -----  From: Sindy Guerrero  Sent: 11/11/2024   3:57 PM CST  To: Kika Florian RN; Aziza Leon    11/19  Naz-this is a non clinic day for [facility].    Thanks!

## 2024-11-11 NOTE — TELEPHONE ENCOUNTER
Reviewed echo, will hold on pre-imaging. Pt to be scheduled for consultation.     Kika Florian RN on 11/11/2024 at 1:05 PM

## 2024-11-11 NOTE — TELEPHONE ENCOUNTER
===View-only below this line===  ----- Message -----  From: Shannan Lazo  Sent: 11/11/2024  10:27 AM CST  To: [facility]    PER SANDRA BARFIELD VISIT TODAY PATIENT TO BE SEEN IN VALVE CLINIC.    THANKSZANDRA

## 2024-11-11 NOTE — LETTER
11/11/2024    Maxine Bonner, APRN CNP  [address] [facility] [address]    RE: Pamela Engle       Dear Colleague,     I had the pleasure of seeing Pamela Engle in the [facility].    HEART CARE ENCOUNTER NOTE      [facility]  [phone]      Assessment/Recommendations   Assessment:   Aortic stenosis: Moderate to severe as seen on recent echo 11/4/2024.  Per the echocardiogram reader it was felt to be closer to severe.  Patient is currently asymptomatic denying lower extremity edema, fatigue, shortness of breath.  Discussed repeating an echocardiogram in 6 months versus discussing with valve team about what valve replacement would look like- TAVR versus surgery.  Will have patient see valve team to have a plan moving forward.  Hypertension: Well-controlled without medications  Hyperlipidemia: On rosuvastatin 10 mg every other day.  Last lipids 9/12/2024 showed LDL of 111      Plan:   Increase rosuvastatin to 10 mg daily instead of every other day  Recheck fasting cholesterol in 3 months  Patient will see valve team to discuss her moderate to severe aortic stenosis      Follow up in 6 months with new general cardiologist (previous Dr. Hernandez patient)     History of Present Illness/Subjective    HPI: Pamela Engle is a 74 year old female with PMHx of hyperlipidemia, aortic stenosis presents for follow-up.  Patient last saw Dr. Hernandez 10/29/2021 where patient had been active denying any anginal symptoms.  Patient had noted her activities were improved than they were previously.  Patient's blood pressure at visit was elevated in the 140s systolic.  There was discussion about low-dose lisinopril which patient had declined and wanted to focus on decreasing sodium intake.  Recent echocardiogram was obtained 11/4/2024 that showed moderate to severe aortic stenosis though felt it was closer to severe than the gradient and velocity where showing due to nonparallel angle of  interrogation.     Patient notes she has been doing well and denies any major concerns.  Inquiring about next steps regarding her aortic valve.  Patient notes she is active golfing and playing pickle ball and denies any exertional angina or dyspnea.  Patient denies fatigue, lightheadedness, palpitations, lower extremity edema.  Patient typically goes to [place] for the winter though notes this is very flexible.          Echocardiogram 11/4/24 results:  The left ventricle is normal in size.  Biplane LVEF is 57%.  No regional wall motion abnormalities noted.  Moderate to severe valvular aortic stenosis. SABRINA 1.1 cm^2, Peak Michael 3.1 m/s,  Mean gradient 24 mmHg, DI 0.29  RVSP 26mmHg  Compared to the prior study dated 10/31/2022 no signficant change, though  suspect the AS is closer to severe than suggested by gradient and velocity due  to nonparallel angle of interrogation on both studies     Physical Examination  Review of Systems   Vitals: /78 (BP Location: Right arm, Patient Position: Sitting, Cuff Size: Adult Regular)   Pulse 60   Resp 14   Wt 68 kg (150 lb)   BMI 30.55 kg/m    BMI= Body mass index is 30.55 kg/m .  Wt Readings from Last 3 Encounters:   11/11/24 68 kg (150 lb)   09/09/24 64.9 kg (143 lb)   07/11/23 62.1 kg (137 lb)           ENT/Mouth: membranes moist, no oral lesions or bleeding gums.      EYES:  no scleral icterus, normal conjunctivae       Chest/Lungs:   lungs are clear to auscultation, no rales or wheezing,  equal chest wall expansion    Cardiovascular:   Regular. Normal first and second heart sounds with 3 out of 6 systolic murmur, no rubs, or gallops; the carotid, radial and posterior tibial pulses are intact, no edema bilaterally        Extremities: no cyanosis or clubbing   Skin: no xanthelasma, warm.    Neurologic: no tremors     Psychiatric: alert and oriented x3, calm        Please refer above for cardiac ROS details.        Medical History  Surgical History Family History Social  History   Past Medical History:   Diagnosis Date     Anemia 2012    after surgery     Aortic stenosis     cardiology visit follow up due Fall of 2021     Arthritis      Complication of anesthesia      Gastroesophageal reflux disease     rarely     Hepatitis     hepatitis A as a child     History of anesthesia complications     Tremors after surgery      Primary insomnia 10/5/2020     Past Surgical History:   Procedure Laterality Date     ARTHROSCOPY KNEE Left 2003     ARTHROSCOPY KNEE Right 2010     BIOPSY  2001    bone marrow donator     BUNIONECTOMY PORTER Bilateral 1990's    and hammer toe surgery     ENT SURGERY  1950's     EXCISE MASS TRUNK N/A 11/5/2019    Procedure: EXCISION SUBCUTANEOUS MASS UPPER CHEST MIDLINE;  Surgeon: Karla Vasquez MD;  Location: [facility] OR     EXCISE CASTANO'S NEUROMA FOOT  2007 and  redo 2009     GYN SURGERY  1984    tubal ligation     ORTHOPEDIC SURGERY Right 01/2019    rotator cuff repair     SOFT TISSUE SURGERY  2012    lipoma removed from back     Family History   Problem Relation Age of Onset     CABG Mother 77     Arthritis Mother      Kidney Disease Mother      Pacemaker Mother      Colon Cancer Father      Hypertension Father      Hyperlipidemia Father      Prostate Cancer Father      Dementia Father      CABG Father      CABG Sister 58     Diabetes Sister      Coronary Stenting Sister 63        2 stents     Substance Abuse Brother      Myocardial Infarction Brother      CABG Brother      Liver Cancer Maternal Grandmother      Breast Cancer Paternal Grandmother      Lung Cancer Paternal Grandfather         Social History     Socioeconomic History     Marital status:      Spouse name: Not on file     Number of children: Not on file     Years of education: Not on file     Highest education level: Not on file   Occupational History     Not on file   Tobacco Use     Smoking status: Former     Current packs/day: 0.00     Average packs/day: 1 pack/day for 41.1 years (41.1 ttl  pk-yrs)     Types: Cigarettes     Start date: 1969     Quit date: 2010     Years since quittin.7     Smokeless tobacco: Never   Vaping Use     Vaping status: Never Used   Substance and Sexual Activity     Alcohol use: Yes     Alcohol/week: 2.0 standard drinks of alcohol     Comment: 1-2 drinks per week     Drug use: No     Sexual activity: Not Currently   Other Topics Concern     Not on file   Social History Narrative     Not on file     Social Drivers of Health     Financial Resource Strain: Low Risk  (2024)    Financial Resource Strain      Within the past 12 months, have you or your family members you live with been unable to get utilities (heat, electricity) when it was really needed?: No   Food Insecurity: No Food Insecurity (10/2/2024)    Received from [facility]    Hunger Vital Sign      Worried About Running Out of Food in the Last Year: Never true      Ran Out of Food in the Last Year: Never true   Transportation Needs: No Transportation Needs (10/2/2024)    Received from [facility]    PRAPARE - Transportation      Lack of Transportation (Medical): No      Lack of Transportation (Non-Medical): No   Physical Activity: Insufficiently Active (10/2/2024)    Received from [facility]    Exercise Vital Sign      Days of Exercise per Week: 2 days      Minutes of Exercise per Session: 20 min   Stress: No Stress Concern Present (2024)    [employer] - Occupational Stress Questionnaire      Feeling of Stress : Not at all   Social Connections: Unknown (2024)    Social Connection and Isolation Panel [NHANES]      Frequency of Communication with Friends and Family: Not on file      Frequency of Social Gatherings with Friends and Family: Twice a week      Attends [employer] Services: Not on file      Active Member of Clubs or Organizations: Not on file      Attends Club or Organization Meetings: Not on file      Marital Status: Not on file   Interpersonal Safety: Not  "on file   Housing Stability: Low Risk  (10/2/2024)    Received from [facility]    Housing Stability      What is your living situation today?: I have a steady place to live           Medications  Allergies   Current Outpatient Medications   Medication Sig Dispense Refill     alendronate (FOSAMAX) 70 MG tablet TAKE 1 TABLET (70 MG TOTAL) BY MOUTH EVERY 7 DAYS. 12 tablet 3     Calcium-Magnesium-Zinc (HUGO-MAG-ZINC OR) Take 500 mg by mouth 2 times daily       Cholecalciferol (VITAMIN D3) 50 MCG (2000 UT) CAPS Take by mouth daily       ibuprofen (ADVIL/MOTRIN) 200 MG tablet Take 3 tablets (600 mg) by mouth every 6 hours as needed for pain 50 tablet 0     Multiple Vitamins-Minerals (HAIR SKIN AND NAILS FORMULA) TABS Take by mouth daily       Multiple Vitamins-Minerals (MULTIVITAMIN PO) Take by mouth daily       rosuvastatin (CRESTOR) 10 MG tablet Take 1 tablet (10 mg) by mouth every other day. 45 tablet 3     valACYclovir (VALTREX) 1000 mg tablet TAKE TWO TABLETS BY MOUTH TWICE A DAY FOR ONE DAY AS NEEDED FOR COLD SORES 20 tablet 1       Allergies   Allergen Reactions     Hydrocodone Swelling          Lab Results    Chemistry/lipid CBC Cardiac Enzymes/BNP/TSH/INR   Recent Labs   Lab Test 09/12/24  1127   CHOL 202*   HDL 68   *   TRIG 113     Recent Labs   Lab Test 09/12/24  1127 11/03/22  0818 09/11/19  0845   * 93 99     Recent Labs   Lab Test 09/12/24  1127      POTASSIUM 4.7   CHLORIDE 106   CO2 28   GLC 88   BUN 14.8   CR 0.74   GFRESTIMATED 85   HUGO 9.3     Recent Labs   Lab Test 09/12/24  1127 04/06/23  1327 07/18/22  1402   CR 0.74 0.67 0.83     Recent Labs   Lab Test 09/12/24  1127 02/09/22  1400 01/13/21  1416   A1C 5.4 5.6 5.8*          Recent Labs   Lab Test 09/12/24  1127   WBC 5.7   HGB 12.9   HCT 39.4   MCV 91        Recent Labs   Lab Test 09/12/24  1127 04/06/23  1327 07/18/22  1402   HGB 12.9 12.2 12.3    No results for input(s): \"TROPONINI\" in the last 25266 hours.  No results " "for input(s): \"BNP\", \"NTBNPI\", \"NTBNP\" in the last 86572 hours.  Recent Labs   Lab Test 09/12/24  1127   TSH 1.98     No results for input(s): \"INR\" in the last 91992 hours.       Delilah Begum PA-C                 Thank you for allowing me to participate in the care of your patient.      Sincerely,     Delilah Begum PA-C     [facility]  cc:   Yeison Hernandez MD  [address]  [place] [address]      "

## 2024-11-11 NOTE — PATIENT INSTRUCTIONS
Pamela Engle,    It was a pleasure to see you today at the [facility].     My recommendations after this visit include:    - Continue current medications.  - Schedule appointment with Dr. Maguire to discuss aortic valve stenosis replacement  - Increase rosuvastatin to 10 daily instead of every other day  - Fasting cholesterol check in 3 months  - Establish care with new general cardiologist in 6 months    - Please call [phone], if you have any questions or concerns      Delilah Begum PA-C

## 2024-11-12 ENCOUNTER — MYC MEDICAL ADVICE (OUTPATIENT)
Dept: INTERNAL MEDICINE | Facility: CLINIC | Age: 74
End: 2024-11-12
Payer: COMMERCIAL

## 2024-11-12 DIAGNOSIS — E78.00 PURE HYPERCHOLESTEROLEMIA: ICD-10-CM

## 2024-11-12 RX ORDER — ROSUVASTATIN CALCIUM 10 MG/1
10 TABLET, COATED ORAL DAILY
Qty: 90 TABLET | Refills: 3 | Status: SHIPPED | OUTPATIENT
Start: 2024-11-12

## 2024-11-19 ENCOUNTER — APPOINTMENT (OUTPATIENT)
Dept: CARDIOLOGY | Facility: CLINIC | Age: 74
End: 2024-11-19
Payer: COMMERCIAL

## 2024-11-19 ENCOUNTER — OFFICE VISIT (OUTPATIENT)
Dept: CARDIOLOGY | Facility: CLINIC | Age: 74
End: 2024-11-19
Payer: COMMERCIAL

## 2024-11-19 ENCOUNTER — ALLIED HEALTH/NURSE VISIT (OUTPATIENT)
Dept: CARDIOLOGY | Facility: CLINIC | Age: 74
End: 2024-11-19
Payer: COMMERCIAL

## 2024-11-19 VITALS
SYSTOLIC BLOOD PRESSURE: 132 MMHG | HEIGHT: 60 IN | BODY MASS INDEX: 28.86 KG/M2 | WEIGHT: 147 LBS | DIASTOLIC BLOOD PRESSURE: 68 MMHG | RESPIRATION RATE: 14 BRPM | HEART RATE: 60 BPM

## 2024-11-19 DIAGNOSIS — I35.0 AORTIC VALVE STENOSIS: ICD-10-CM

## 2024-11-19 DIAGNOSIS — I35.0 NONRHEUMATIC AORTIC VALVE STENOSIS: ICD-10-CM

## 2024-11-19 DIAGNOSIS — I35.0 NONRHEUMATIC AORTIC VALVE STENOSIS: Primary | ICD-10-CM

## 2024-11-19 LAB
ATRIAL RATE - MUSE: 57 BPM
DIASTOLIC BLOOD PRESSURE - MUSE: NORMAL MMHG
INTERPRETATION ECG - MUSE: NORMAL
P AXIS - MUSE: 41 DEGREES
PR INTERVAL - MUSE: 148 MS
QRS DURATION - MUSE: 74 MS
QT - MUSE: 430 MS
QTC - MUSE: 418 MS
R AXIS - MUSE: 21 DEGREES
SYSTOLIC BLOOD PRESSURE - MUSE: NORMAL MMHG
T AXIS - MUSE: 60 DEGREES
VENTRICULAR RATE- MUSE: 57 BPM

## 2024-11-19 PROCEDURE — G2211 COMPLEX E/M VISIT ADD ON: HCPCS | Performed by: INTERNAL MEDICINE

## 2024-11-19 PROCEDURE — 93000 ELECTROCARDIOGRAM COMPLETE: CPT | Performed by: INTERNAL MEDICINE

## 2024-11-19 PROCEDURE — 99207 PR NO CHARGE LOS: CPT

## 2024-11-19 PROCEDURE — 99205 OFFICE O/P NEW HI 60 MIN: CPT | Performed by: INTERNAL MEDICINE

## 2024-11-19 NOTE — LETTER
11/19/2024    Maxine Bonner, APRN CNP  [address] [facility] [address]    RE: Pamela LOPEZ Oniel       Dear Colleague,     I had the pleasure of seeing Pamela Engle in the [facility].  [facility]   [address], [place] [address]   [email or web address]   OFFICE: [phone]     IMPRESSION:  Moderate approaching severe asymptomatic aortic valve stenosis (PV 3.1 m/s, MG 24 mmHg, and calculated valve area 0.97-1.1 cm .  DI 0.29.  SVI 53.2 mL/m ).   Functional Capacity:  NYHA class I, CCS class 0   Hyperlipidemia  Osteoporosis      SUMMARY OF RECOMMENDATIONS: We had a long discussion with Ms. Engle and her family regarding natural progression and therapeutic options of calcific aortic valve disease.  Severity of her aortic valve stenosis is unclear, likely moderate approaching severe.  We discussed conservative management with close monitoring given overall stable gradients/valve area since 2021 and lack of symptoms.  Alternatively, discussed further evaluation with coronary angiogram and R/L heart catheterization with valve study, and TAVR CT with valve calcium score.  Patient prefers the former, we will plan for repeat echocardiogram in 6 months with clinic follow-up.  Patient to contact us sooner if she develops new symptoms.    Dear Maxine Bonner     I had the pleasure of seeing Pamela Engle today at the [facility] for evaluation for Transcatheter Aortic Valve Replacement for calcific aortic valve stenosis.  Briefly, patient was recently evaluated in cardiology clinic and underwent an echocardiogram for routine monitoring of known aortic valve disease, notable for moderate to severe aortic valve stenosis.  Today, Ms. Engle reports overall she has been doing well.  She denies denies symptoms of chest pain, shortness of breath, palpitations,  "dizziness, lightheadedness, presyncope, syncope, orthopnea, PND, early satiety/abdominal bloating, lower extremity edema, stroke like symptoms, or claudication.  She continues to be active and played 1 hour of pickleball yesterday without symptoms.    ROS:  A 14 point review of symptoms was reviewed.  No prior hx of rheumatic fever or chest irradiation. There were no additional findings pertinent to this encounter.     Past Medical History: as above     Social History: History of tobacco use, quit 14 years ago    Family History: No early family history of cardiovascular disease or sudden cardiac death.  She does have multiple first-degree family members who have underwent coronary artery bypass surgery.    Allergies: reviewed   Allergies   Allergen Reactions     Hydrocodone Swelling       Pertinent Medications:    Current Outpatient Medications   Medication Sig Dispense Refill     alendronate (FOSAMAX) 70 MG tablet TAKE 1 TABLET (70 MG TOTAL) BY MOUTH EVERY 7 DAYS. 12 tablet 3     Calcium-Magnesium-Zinc (HUGO-MAG-ZINC OR) Take 500 mg by mouth 2 times daily       Cholecalciferol (VITAMIN D3) 50 MCG (2000 UT) CAPS Take 2,000 Units by mouth every other day.       ibuprofen (ADVIL/MOTRIN) 200 MG tablet Take 3 tablets (600 mg) by mouth every 6 hours as needed for pain 50 tablet 0     Multiple Vitamins-Minerals (HAIR SKIN AND NAILS FORMULA) TABS Take 1 tablet by mouth daily.       Multiple Vitamins-Minerals (MULTIVITAMIN PO) Take 1 tablet by mouth daily.       rosuvastatin (CRESTOR) 10 MG tablet Take 1 tablet (10 mg) by mouth daily. 90 tablet 3     valACYclovir (VALTREX) 1000 mg tablet TAKE TWO TABLETS BY MOUTH TWICE A DAY FOR ONE DAY AS NEEDED FOR COLD SORES 20 tablet 1     No current facility-administered medications for this visit.       OBJECTIVE:  Physical Examination   /68 (BP Location: Right arm, Patient Position: Sitting, Cuff Size: Adult Regular)   Pulse 60   Resp 14   Ht 1.511 m (4' 11.5\")   Wt 66.7 kg " (147 lb)   BMI 29.19 kg/m     Constitutional: Not in acute distress  Cognitive: Alert, oriented x 3, Appropriate speech  Chest:  Clear bilaterally   Cardiac: regular rhythm, normal S1/S2, mid to late peaking systolic murmur consistent with severe aortic stenosis. JVP not elevated.    Abdomen:  Soft, non-tender  Pulses: 2+ distal pulses.  No carotid bruits  Skin and Integument:  No peripheral edema, extremities warm  Neuro:  Grossly normal for motor, sensory.      Studies:     Laboratory performed September 2024 and reviewed by me personally showed: Serum creatinine of 0.74, hemoglobin 12.9, and platelet count of 308.        Electrocardiogram performed today and reviewed by me personally showed: Sinus bradycardia, no acute repolarization abnormalities    TTE performed November 2024 and reviewed by me personally showed: Normal LV size with normal LV function, estimated EF of 55 to 60%, normal RV size and function, moderate approaching severe aortic valve stenosis (PV 3.1 m/s, MG 24 mmHg, and calculated valve area 0.97-1.1 cm .  DI 0.29.  SVI 53.2 mL/m ).  Visually, the valve is calcified with reduced leaflet motion. Of note, in comparison to echocardiogram from 2021 and 2022 gradients and valve area overall similar.        I reviewed the TAVR procedure, risk and expected outcomes with the patient and family members in detail.  I explained that the aortic valve is very significantly impaired, and strenuous exercise should be avoided until after the valve is repaired. Risks associated with transcatheter valve therapy were reviewed. Risks including death, MI, stroke, permanent disability, neurologic injury, renal failure, major bleeding requiring blood transfusion, emergency thoracotomy for catastrophic complications, vascular injury requiring endovascular or surgical repair, need for permanent pacemaker and anesthesia related complications were fully explained.  Alternative approaches including surgical aortic valve  replacement and palliative care were discussed.  My own experience with these procedures were reviewed, and the patient and family appeared to understand the information I presented.  Questions were asked and answered to their apparent satisfaction.      The longitudinal plan of care for the diagnosis(es)/condition(s) as documented were addressed during this visit. Due to the added complexity in care, I will continue to support Danni in the subsequent management and with ongoing continuity of care.      I sincerely appreciate the opportunity to participate in this patient's care.  Please do not hesitate to contact me if any questions or concerns arise.     Esau Lyons MD  Interventional Cardiology               Thank you for allowing me to participate in the care of your patient.      Sincerely,     Esau Lyons MD     [facility]  cc:   Delilah Begum PA-C  [address] [place] [address]

## 2024-11-19 NOTE — PROGRESS NOTES
5 Meter Walk Test:  1st Trial: 3.83   2nd Trial: 3.78  3rd Trial: 4.26    Score: 3.96     
RN visit deferred. Per provider plan for echo in six months and follow up with our team afterwards. Echo order placed. Will forward message to  to arrange appts for May 2025.     Kika Florian RN on 11/19/2024 at 11:17 AM    
colostrum

## 2024-11-19 NOTE — PROGRESS NOTES
[facility] HEART CARE   [address], [place] [address]   [email or web address]   OFFICE: [phone]     IMPRESSION:  Moderate approaching severe asymptomatic aortic valve stenosis (PV 3.1 m/s, MG 24 mmHg, and calculated valve area 0.97-1.1 cm .  DI 0.29.  SVI 53.2 mL/m ).   Functional Capacity:  NYHA class I, CCS class 0   Hyperlipidemia  Osteoporosis      SUMMARY OF RECOMMENDATIONS: We had a long discussion with Ms. Engle and her family regarding natural progression and therapeutic options of calcific aortic valve disease.  Severity of her aortic valve stenosis is unclear, likely moderate approaching severe.  We discussed conservative management with close monitoring given overall stable gradients/valve area since 2021 and lack of symptoms.  Alternatively, discussed further evaluation with coronary angiogram and R/L heart catheterization with valve study, and TAVR CT with valve calcium score.  Patient prefers the former, we will plan for repeat echocardiogram in 6 months with clinic follow-up.  Patient to contact us sooner if she develops new symptoms.    Dear Maxine Bonner had the pleasure of seeing Pamela Engle today at the [facility] for evaluation for Transcatheter Aortic Valve Replacement for calcific aortic valve stenosis.  Briefly, patient was recently evaluated in cardiology clinic and underwent an echocardiogram for routine monitoring of known aortic valve disease, notable for moderate to severe aortic valve stenosis.  Today, Ms. Engle reports overall she has been doing well.  She denies denies symptoms of chest pain, shortness of breath, palpitations, dizziness, lightheadedness, presyncope, syncope, orthopnea, PND, early satiety/abdominal bloating, lower extremity edema, stroke like symptoms, or claudication.  She continues to be active and played 1 hour of pickleball  "yesterday without symptoms.    ROS:  A 14 point review of symptoms was reviewed.  No prior hx of rheumatic fever or chest irradiation. There were no additional findings pertinent to this encounter.     Past Medical History: as above     Social History: History of tobacco use, quit 14 years ago    Family History: No early family history of cardiovascular disease or sudden cardiac death.  She does have multiple first-degree family members who have underwent coronary artery bypass surgery.    Allergies: reviewed   Allergies   Allergen Reactions    Hydrocodone Swelling       Pertinent Medications:    Current Outpatient Medications   Medication Sig Dispense Refill    alendronate (FOSAMAX) 70 MG tablet TAKE 1 TABLET (70 MG TOTAL) BY MOUTH EVERY 7 DAYS. 12 tablet 3    Calcium-Magnesium-Zinc (HUGO-MAG-ZINC OR) Take 500 mg by mouth 2 times daily      Cholecalciferol (VITAMIN D3) 50 MCG (2000 UT) CAPS Take 2,000 Units by mouth every other day.      ibuprofen (ADVIL/MOTRIN) 200 MG tablet Take 3 tablets (600 mg) by mouth every 6 hours as needed for pain 50 tablet 0    Multiple Vitamins-Minerals (HAIR SKIN AND NAILS FORMULA) TABS Take 1 tablet by mouth daily.      Multiple Vitamins-Minerals (MULTIVITAMIN PO) Take 1 tablet by mouth daily.      rosuvastatin (CRESTOR) 10 MG tablet Take 1 tablet (10 mg) by mouth daily. 90 tablet 3    valACYclovir (VALTREX) 1000 mg tablet TAKE TWO TABLETS BY MOUTH TWICE A DAY FOR ONE DAY AS NEEDED FOR COLD SORES 20 tablet 1     No current facility-administered medications for this visit.       OBJECTIVE:  Physical Examination   /68 (BP Location: Right arm, Patient Position: Sitting, Cuff Size: Adult Regular)   Pulse 60   Resp 14   Ht 1.511 m (4' 11.5\")   Wt 66.7 kg (147 lb)   BMI 29.19 kg/m     Constitutional: Not in acute distress  Cognitive: Alert, oriented x 3, Appropriate speech  Chest:  Clear bilaterally   Cardiac: regular rhythm, normal S1/S2, mid to late peaking systolic murmur " consistent with severe aortic stenosis. JVP not elevated.    Abdomen:  Soft, non-tender  Pulses: 2+ distal pulses.  No carotid bruits  Skin and Integument:  No peripheral edema, extremities warm  Neuro:  Grossly normal for motor, sensory.      Studies:     Laboratory performed September 2024 and reviewed by me personally showed: Serum creatinine of 0.74, hemoglobin 12.9, and platelet count of 308.        Electrocardiogram performed today and reviewed by me personally showed: Sinus bradycardia, no acute repolarization abnormalities    TTE performed November 2024 and reviewed by me personally showed: Normal LV size with normal LV function, estimated EF of 55 to 60%, normal RV size and function, moderate approaching severe aortic valve stenosis (PV 3.1 m/s, MG 24 mmHg, and calculated valve area 0.97-1.1 cm .  DI 0.29.  SVI 53.2 mL/m ).  Visually, the valve is calcified with reduced leaflet motion. Of note, in comparison to echocardiogram from 2021 and 2022 gradients and valve area overall similar.        I reviewed the TAVR procedure, risk and expected outcomes with the patient and family members in detail.  I explained that the aortic valve is very significantly impaired, and strenuous exercise should be avoided until after the valve is repaired. Risks associated with transcatheter valve therapy were reviewed. Risks including death, MI, stroke, permanent disability, neurologic injury, renal failure, major bleeding requiring blood transfusion, emergency thoracotomy for catastrophic complications, vascular injury requiring endovascular or surgical repair, need for permanent pacemaker and anesthesia related complications were fully explained.  Alternative approaches including surgical aortic valve replacement and palliative care were discussed.  My own experience with these procedures were reviewed, and the patient and family appeared to understand the information I presented.  Questions were asked and answered to their  apparent satisfaction.      The longitudinal plan of care for the diagnosis(es)/condition(s) as documented were addressed during this visit. Due to the added complexity in care, I will continue to support Danni in the subsequent management and with ongoing continuity of care.      I sincerely appreciate the opportunity to participate in this patient's care.  Please do not hesitate to contact me if any questions or concerns arise.     Esau Lyons MD  Interventional Cardiology

## 2024-12-03 ENCOUNTER — MYC MEDICAL ADVICE (OUTPATIENT)
Dept: INTERNAL MEDICINE | Facility: CLINIC | Age: 74
End: 2024-12-03
Payer: COMMERCIAL

## 2024-12-03 DIAGNOSIS — B00.9 HSV (HERPES SIMPLEX VIRUS) INFECTION: ICD-10-CM

## 2024-12-03 RX ORDER — VALACYCLOVIR HYDROCHLORIDE 1 G/1
TABLET, FILM COATED ORAL
Qty: 20 TABLET | Refills: 1 | Status: SHIPPED | OUTPATIENT
Start: 2024-12-03

## 2024-12-03 NOTE — TELEPHONE ENCOUNTER
Patient requested refill on valcyclovir in [place]. Med and pharmacy pended.     Summer RN 12:54 PM December 3, 2024   [facility]     Repeat INR in am. BP on the lower side.  Continue IVF.

## 2024-12-23 ENCOUNTER — PATIENT OUTREACH (OUTPATIENT)
Dept: CARE COORDINATION | Facility: CLINIC | Age: 74
End: 2024-12-23
Payer: COMMERCIAL

## 2025-01-21 ENCOUNTER — ANCILLARY PROCEDURE (OUTPATIENT)
Dept: MAMMOGRAPHY | Facility: CLINIC | Age: 75
End: 2025-01-21
Payer: COMMERCIAL

## 2025-01-21 DIAGNOSIS — Z12.31 VISIT FOR SCREENING MAMMOGRAM: ICD-10-CM

## 2025-01-21 PROCEDURE — 77063 BREAST TOMOSYNTHESIS BI: CPT | Mod: TC | Performed by: RADIOLOGY

## 2025-01-21 PROCEDURE — 77067 SCR MAMMO BI INCL CAD: CPT | Mod: TC | Performed by: RADIOLOGY

## 2025-03-15 ENCOUNTER — HEALTH MAINTENANCE LETTER (OUTPATIENT)
Age: 75
End: 2025-03-15

## 2025-04-30 ENCOUNTER — TRANSFERRED RECORDS (OUTPATIENT)
Dept: HEALTH INFORMATION MANAGEMENT | Facility: CLINIC | Age: 75
End: 2025-04-30
Payer: COMMERCIAL

## 2025-05-19 ENCOUNTER — DOCUMENTATION ONLY (OUTPATIENT)
Dept: CARDIOLOGY | Facility: CLINIC | Age: 75
End: 2025-05-19

## 2025-05-19 ENCOUNTER — HOSPITAL ENCOUNTER (OUTPATIENT)
Dept: CARDIOLOGY | Facility: CLINIC | Age: 75
Discharge: HOME | End: 2025-05-19
Attending: INTERNAL MEDICINE | Admitting: INTERNAL MEDICINE
Payer: COMMERCIAL

## 2025-05-19 ENCOUNTER — RESULTS FOLLOW-UP (OUTPATIENT)
Dept: CARDIOLOGY | Facility: CLINIC | Age: 75
End: 2025-05-19

## 2025-05-19 DIAGNOSIS — I35.0 NONRHEUMATIC AORTIC VALVE STENOSIS: ICD-10-CM

## 2025-05-19 DIAGNOSIS — I35.0 NONRHEUMATIC AORTIC VALVE STENOSIS: Primary | ICD-10-CM

## 2025-05-19 LAB — LVEF ECHO: NORMAL

## 2025-05-19 PROCEDURE — 93306 TTE W/DOPPLER COMPLETE: CPT

## 2025-05-19 PROCEDURE — 93306 TTE W/DOPPLER COMPLETE: CPT | Mod: 26 | Performed by: INTERNAL MEDICINE

## 2025-05-19 NOTE — CONFIDENTIAL NOTE
"Valve Clinic Prep Worksheet    Patient Name: Pamela Engle  : 1950  AGE: 74 year old     Patient Address: [address] [place] [address] BMI: 29.19   Height (CM):    Ht Readings from Last 1 Encounters:   24 1.511 m (4' 11.5\")                                                      Weight (kg):   Wt Readings from Last 1 Encounters:   24 66.7 kg (147 lb)                                                          Contact info/Phone number:  [phone]  Initial STS: 1.96%                                           Referred by: Padma Begum PA-C  Date: 2024. F/U 6 month echo shows progression. Insurance:Payor: [facility] / Plan: [employer] MEDICARE ADVANTAGE / Product Type: Medicare /    Patient Care Team: Patient Care Team:  Maxine Bonner APRN CNP as PCP - General (Internal Medicine)  Lexis Suarez AuD as Audiologist (Audiology)  Maxine Bonner APRN CNP as Assigned PCP  Delilah Begum PA-C as Physician Assistant (Physician Assistant - Medical)  Esau Lyons MD as Assigned Heart and Vascular Provider    Past Medical History   Problem List:  2021-10: Aortic valve stenosis  2020-10: S/P lumbar fusion  2019: Chest mass  2019: History of rotator cuff surgery  2018: Fuchs' endothelial dystrophy  2018: Hyperlipidemia  2018: Oral herpes  2018: Osteoarthrosis     Transthoracic Echocardiogram    Date (2025): M mmHg Peak V: 3.18 m/sec SABRINA: 0.84 cm  DI: 0.26 SVI: 41.2 ml/m  EF: 60-65 %  Ao max P mmHg      Comments on Valves: moderate to severe valvular aortic stenosis, moderate aortic valve calcification, trace TR, trace MR,    Heart Cath Summary CTA (TAVR) RICHIE   No results found.  not ordered Not ordered   Labs   Creat:   Creatinine   Date Value Ref Range Status   2024 0.74 0.51 - 0.95 mg/dL Final       GFR:   GFR Estimate   Date Value Ref Range Status   2024 85 >60 mL/min/1.73m2 Final     Comment:     eGFR calculated using  CKD-EPI equation.   2023 " ">90 >60 mL/min/1.73m2 Final     Comment:     eGFR calculated using 2021 CKD-EPI equation.   07/18/2022 75 >60 mL/min/1.73m2 Final     Comment:     Effective December 21, 2021 eGFRcr in adults is calculated using the 2021 CKD-EPI creatinine equation which includes age and gender (Joseph valdes al., [place], DOI: 10.1056/MQXZyd5659339)   01/13/2021 >60 >60 mL/min/1.73m2 Final   09/17/2020 >60 >60 mL/min/1.73m2 Final   09/11/2019 >60 >60 mL/min/1.73m2 Final     GFR Estimate If Black   Date Value Ref Range Status   01/13/2021 >60 >60 mL/min/1.73m2 Final   09/17/2020 >60 >60 mL/min/1.73m2 Final   09/11/2019 >60 >60 mL/min/1.73m2 Final       Potassium:   Potassium   Date Value Ref Range Status   09/12/2024 4.7 3.4 - 5.3 mmol/L Final   02/09/2022 4.1 3.5 - 5.0 mmol/L Final       Sodium:   Sodium   Date Value Ref Range Status   09/12/2024 142 135 - 145 mmol/L Final       WBC:   WBC Count   Date Value Ref Range Status   09/12/2024 5.7 4.0 - 11.0 10e3/uL Final       Hgb:   Hemoglobin   Date Value Ref Range Status   09/12/2024 12.9 11.7 - 15.7 g/dL Final   04/06/2023 12.2 11.7 - 15.7 g/dL Final       HCT:   Hematocrit   Date Value Ref Range Status   09/12/2024 39.4 35.0 - 47.0 % Final       Plts:   Platelet Count   Date Value Ref Range Status   09/12/2024 308 150 - 450 10e3/uL Final       Albumin:   Lab Results   Component Value Date    ALBUMIN 4.7 07/18/2022    ALBUMIN 4.2 02/09/2022       INR: No results found for: \"INR\"     Current Medications: Allergies:    Current Outpatient Medications   Medication Sig Dispense Refill    alendronate (FOSAMAX) 70 MG tablet TAKE 1 TABLET (70 MG TOTAL) BY MOUTH EVERY 7 DAYS. 12 tablet 3    Calcium-Magnesium-Zinc (HUGO-MAG-ZINC OR) Take 500 mg by mouth 2 times daily      Cholecalciferol (VITAMIN D3) 50 MCG (2000 UT) CAPS Take 2,000 Units by mouth every other day.      ibuprofen (ADVIL/MOTRIN) 200 MG tablet Take 3 tablets (600 mg) by mouth every 6 hours as needed for pain 50 tablet 0    Multiple " Vitamins-Minerals (HAIR SKIN AND NAILS FORMULA) TABS Take 1 tablet by mouth daily.      Multiple Vitamins-Minerals (MULTIVITAMIN PO) Take 1 tablet by mouth daily.      rosuvastatin (CRESTOR) 10 MG tablet Take 1 tablet (10 mg) by mouth daily. 90 tablet 3    valACYclovir (VALTREX) 1000 mg tablet TAKE TWO TABLETS BY MOUTH TWICE A DAY FOR ONE DAY AS NEEDED FOR COLD SORES 20 tablet 1     No current facility-administered medications for this visit.        Allergies   Allergen Reactions    Hydrocodone Swelling

## 2025-05-21 LAB
ABO + RH BLD: NORMAL
BLD GP AB SCN SERPL QL: NEGATIVE
SPECIMEN EXP DATE BLD: NORMAL

## 2025-05-21 NOTE — PROGRESS NOTES
[facility] HEART CARE   [address], [place] [address]   [email or web address]   OFFICE: [phone]     IMPRESSION:  Normal flow, low gradient, moderate to severe aortic valve stenosis (PV 3.4 m/s, MG 27mmHg, and calculated aortic valve area of 0.84 to 0.93 cm2. SVI 41.2ml/m2. DI 0.26).     Functional Capacity:  NYHA class II, CCS class 0   Hyperlipidemia  Osteoporosis        SUMMARY OF RECOMMENDATIONS: We had a long discussion with Ms. Engle regarding natural progression and therapeutic options of calcific aortic valve disease.  Her aortic valve stenosis appears to have progressed, now likely early severe with associated with mild, progressive dyspnea on exertion.  We agreed to proceed with workup including TAVR CT with valve calcium score and coronary angiogram.  Once, we have all the available results, we will contact Danni with a further plan.  In the meantime, patient is scheduled to travel to [place] in 2 weeks, while we advised against this, her overall risk with travel is relatively low (given her robust functional status and early severe aortic valve stenosis).      Dear Maxine Bonner had the pleasure of seeing Pamlea Engle today at the [facility] for evaluation for Transcatheter Aortic Valve Replacement for symptomatic severe aortic valve stenosis.  Briefly, patient presents for 6-month follow-up, reports progressive dyspnea on exertion. The patient denies symptoms of chest pain, palpitations, dizziness, lightheadedness, presyncope, syncope, orthopnea, PND, early satiety/abdominal bloating, lower extremity edema, stroke like symptoms, or claudication.       ROS:  A 14 point review of symptoms was reviewed.  No prior hx of rheumatic fever or chest irradiation. There were no additional findings pertinent to this encounter.     Past Medical History: as above     Social  History: History of tobacco use, quit 14 years ago     Family History: No early family history of cardiovascular disease or sudden cardiac death.  She does have multiple first-degree family members who have underwent coronary artery bypass surgery.    Allergies: reviewed   Allergies   Allergen Reactions    Hydrocodone Swelling       Pertinent Medications:    Current Outpatient Medications   Medication Sig Dispense Refill    alendronate (FOSAMAX) 70 MG tablet TAKE 1 TABLET (70 MG TOTAL) BY MOUTH EVERY 7 DAYS. 12 tablet 3    Calcium-Magnesium-Zinc (HUGO-MAG-ZINC OR) Take 500 mg by mouth 2 times daily      Cholecalciferol (VITAMIN D3) 50 MCG (2000 UT) CAPS Take 2,000 Units by mouth every other day.      ibuprofen (ADVIL/MOTRIN) 200 MG tablet Take 3 tablets (600 mg) by mouth every 6 hours as needed for pain 50 tablet 0    Multiple Vitamins-Minerals (HAIR SKIN AND NAILS FORMULA) TABS Take 1 tablet by mouth daily.      Multiple Vitamins-Minerals (MULTIVITAMIN PO) Take 1 tablet by mouth daily.      rosuvastatin (CRESTOR) 10 MG tablet Take 1 tablet (10 mg) by mouth daily. 90 tablet 3    valACYclovir (VALTREX) 1000 mg tablet TAKE TWO TABLETS BY MOUTH TWICE A DAY FOR ONE DAY AS NEEDED FOR COLD SORES 20 tablet 1     No current facility-administered medications for this visit.       OBJECTIVE:  Physical Examination   /76 (BP Location: Right arm, Patient Position: Sitting, Cuff Size: Adult Regular)   Pulse 70   Resp 14   Wt 68.5 kg (151 lb)   BMI 29.99 kg/m     Constitutional:  NAD  Cognitive: Alert, oriented x 3, Appropriate speech  Chest:  Clear bilaterally   Cardiac: regular rhythm, normal S1/S2, late peaking systolic murmur consistent with severe aortic stenosis. JVP not elevated.    Abdomen:  Soft, non-tender  Pulses: 2+ distal pulses.  No carotid bruits  Skin and Integument:  No peripheral edema, extremities warm  Neuro:  Grossly normal for motor, sensory.      Studies:     Laboratory performed 09/2024 and  reviewed by me personally showed: Serum creatinine 0.74, hemoglobin 12.9, and platelet count 308      Electrocardiogram performed November 2024 and reviewed by me personally showed: Sinus bradycardia, no acute repolarization abnormalities    TTE performed May 2025 and reviewed by me personally showed: Normal left ventricular function with estimated EF 60-65%, normal RV size and function, and normal flow, low gradient, moderate to severe aortic valve stenosis (PV 3.4 m/s, MG 27mmHg, and calculated aortic valve area of 0.84 to 0.93 cm2). SVI 41.2ml/m2. DI 0.26.      Coronary angiogram and TAVR CT pending.     I reviewed the TAVR procedure, risk and expected outcomes with the patient and family members in detail.  I explained that the aortic valve is very significantly impaired, and strenuous exercise should be avoided until after the valve is repaired. Risks associated with transcatheter valve therapy were reviewed. Risks including death, MI, stroke, permanent disability, neurologic injury, renal failure, major bleeding requiring blood transfusion, emergency thoracotomy for catastrophic complications, vascular injury requiring endovascular or surgical repair, need for permanent pacemaker and anesthesia related complications were fully explained.  Alternative approaches including surgical aortic valve replacement and palliative care were discussed.  My own experience with these procedures were reviewed, and the patient and family appeared to understand the information I presented.  Questions were asked and answered to their apparent satisfaction.      I sincerely appreciate the opportunity to participate in this patient's care.  Please do not hesitate to contact me if any questions or concerns arise. The longitudinal plan of care for the diagnosis(es)/condition(s) as documented were addressed during this visit. Due to the added complexity in care, I will continue to support Danni in the subsequent management and with  ongoing continuity of care.      Esau Lyons MD  Interventional Cardiology

## 2025-05-21 NOTE — H&P (VIEW-ONLY)
[facility] HEART CARE   [address], [place] [address]   [email or web address]   OFFICE: [phone]     IMPRESSION:  Normal flow, low gradient, moderate to severe aortic valve stenosis (PV 3.4 m/s, MG 27mmHg, and calculated aortic valve area of 0.84 to 0.93 cm2. SVI 41.2ml/m2. DI 0.26).     Functional Capacity:  NYHA class II, CCS class 0   Hyperlipidemia  Osteoporosis        SUMMARY OF RECOMMENDATIONS: We had a long discussion with Ms. Engle regarding natural progression and therapeutic options of calcific aortic valve disease.  Her aortic valve stenosis appears to have progressed, now likely early severe with associated with mild, progressive dyspnea on exertion.  We agreed to proceed with workup including TAVR CT with valve calcium score and coronary angiogram.  Once, we have all the available results, we will contact Danni with a further plan.  In the meantime, patient is scheduled to travel to [place] in 2 weeks, while we advised against this, her overall risk with travel is relatively low (given her robust functional status and early severe aortic valve stenosis).      Dear Maxine Bonner had the pleasure of seeing Pamela Engle today at the [facility] for evaluation for Transcatheter Aortic Valve Replacement for symptomatic severe aortic valve stenosis.  Briefly, patient presents for 6-month follow-up, reports progressive dyspnea on exertion. The patient denies symptoms of chest pain, palpitations, dizziness, lightheadedness, presyncope, syncope, orthopnea, PND, early satiety/abdominal bloating, lower extremity edema, stroke like symptoms, or claudication.       ROS:  A 14 point review of symptoms was reviewed.  No prior hx of rheumatic fever or chest irradiation. There were no additional findings pertinent to this encounter.     Past Medical History: as above     Social  History: History of tobacco use, quit 14 years ago     Family History: No early family history of cardiovascular disease or sudden cardiac death.  She does have multiple first-degree family members who have underwent coronary artery bypass surgery.    Allergies: reviewed   Allergies   Allergen Reactions    Hydrocodone Swelling       Pertinent Medications:    Current Outpatient Medications   Medication Sig Dispense Refill    alendronate (FOSAMAX) 70 MG tablet TAKE 1 TABLET (70 MG TOTAL) BY MOUTH EVERY 7 DAYS. 12 tablet 3    Calcium-Magnesium-Zinc (HUGO-MAG-ZINC OR) Take 500 mg by mouth 2 times daily      Cholecalciferol (VITAMIN D3) 50 MCG (2000 UT) CAPS Take 2,000 Units by mouth every other day.      ibuprofen (ADVIL/MOTRIN) 200 MG tablet Take 3 tablets (600 mg) by mouth every 6 hours as needed for pain 50 tablet 0    Multiple Vitamins-Minerals (HAIR SKIN AND NAILS FORMULA) TABS Take 1 tablet by mouth daily.      Multiple Vitamins-Minerals (MULTIVITAMIN PO) Take 1 tablet by mouth daily.      rosuvastatin (CRESTOR) 10 MG tablet Take 1 tablet (10 mg) by mouth daily. 90 tablet 3    valACYclovir (VALTREX) 1000 mg tablet TAKE TWO TABLETS BY MOUTH TWICE A DAY FOR ONE DAY AS NEEDED FOR COLD SORES 20 tablet 1     No current facility-administered medications for this visit.       OBJECTIVE:  Physical Examination   /76 (BP Location: Right arm, Patient Position: Sitting, Cuff Size: Adult Regular)   Pulse 70   Resp 14   Wt 68.5 kg (151 lb)   BMI 29.99 kg/m     Constitutional:  NAD  Cognitive: Alert, oriented x 3, Appropriate speech  Chest:  Clear bilaterally   Cardiac: regular rhythm, normal S1/S2, late peaking systolic murmur consistent with severe aortic stenosis. JVP not elevated.    Abdomen:  Soft, non-tender  Pulses: 2+ distal pulses.  No carotid bruits  Skin and Integument:  No peripheral edema, extremities warm  Neuro:  Grossly normal for motor, sensory.      Studies:     Laboratory performed 09/2024 and  reviewed by me personally showed: Serum creatinine 0.74, hemoglobin 12.9, and platelet count 308      Electrocardiogram performed November 2024 and reviewed by me personally showed: Sinus bradycardia, no acute repolarization abnormalities    TTE performed May 2025 and reviewed by me personally showed: Normal left ventricular function with estimated EF 60-65%, normal RV size and function, and normal flow, low gradient, moderate to severe aortic valve stenosis (PV 3.4 m/s, MG 27mmHg, and calculated aortic valve area of 0.84 to 0.93 cm2). SVI 41.2ml/m2. DI 0.26.      Coronary angiogram and TAVR CT pending.     I reviewed the TAVR procedure, risk and expected outcomes with the patient and family members in detail.  I explained that the aortic valve is very significantly impaired, and strenuous exercise should be avoided until after the valve is repaired. Risks associated with transcatheter valve therapy were reviewed. Risks including death, MI, stroke, permanent disability, neurologic injury, renal failure, major bleeding requiring blood transfusion, emergency thoracotomy for catastrophic complications, vascular injury requiring endovascular or surgical repair, need for permanent pacemaker and anesthesia related complications were fully explained.  Alternative approaches including surgical aortic valve replacement and palliative care were discussed.  My own experience with these procedures were reviewed, and the patient and family appeared to understand the information I presented.  Questions were asked and answered to their apparent satisfaction.      I sincerely appreciate the opportunity to participate in this patient's care.  Please do not hesitate to contact me if any questions or concerns arise. The longitudinal plan of care for the diagnosis(es)/condition(s) as documented were addressed during this visit. Due to the added complexity in care, I will continue to support Danni in the subsequent management and with  ongoing continuity of care.      Esau Lyons MD  Interventional Cardiology

## 2025-05-22 ENCOUNTER — LAB (OUTPATIENT)
Dept: CARDIOLOGY | Facility: CLINIC | Age: 75
End: 2025-05-22
Payer: COMMERCIAL

## 2025-05-22 ENCOUNTER — ALLIED HEALTH/NURSE VISIT (OUTPATIENT)
Dept: CARDIOLOGY | Facility: CLINIC | Age: 75
End: 2025-05-22
Payer: COMMERCIAL

## 2025-05-22 ENCOUNTER — OFFICE VISIT (OUTPATIENT)
Dept: CARDIOLOGY | Facility: CLINIC | Age: 75
End: 2025-05-22
Payer: COMMERCIAL

## 2025-05-22 VITALS
SYSTOLIC BLOOD PRESSURE: 135 MMHG | RESPIRATION RATE: 14 BRPM | WEIGHT: 151 LBS | DIASTOLIC BLOOD PRESSURE: 76 MMHG | HEART RATE: 70 BPM | BODY MASS INDEX: 29.99 KG/M2

## 2025-05-22 DIAGNOSIS — I35.0 NONRHEUMATIC AORTIC VALVE STENOSIS: ICD-10-CM

## 2025-05-22 DIAGNOSIS — I35.0 NONRHEUMATIC AORTIC VALVE STENOSIS: Primary | ICD-10-CM

## 2025-05-22 DIAGNOSIS — I51.89 OTHER ILL-DEFINED HEART DISEASES: ICD-10-CM

## 2025-05-22 LAB
ANION GAP SERPL CALCULATED.3IONS-SCNC: 10 MMOL/L (ref 7–15)
BLOOD BANK CHART COMMENT: NORMAL
BUN SERPL-MCNC: 14.7 MG/DL (ref 8–23)
CALCIUM SERPL-MCNC: 10.3 MG/DL (ref 8.8–10.4)
CHLORIDE SERPL-SCNC: 104 MMOL/L (ref 98–107)
CREAT SERPL-MCNC: 0.77 MG/DL (ref 0.51–0.95)
EGFRCR SERPLBLD CKD-EPI 2021: 81 ML/MIN/1.73M2
ERYTHROCYTE [DISTWIDTH] IN BLOOD BY AUTOMATED COUNT: 13.8 % (ref 10–15)
GLUCOSE SERPL-MCNC: 89 MG/DL (ref 70–99)
HCO3 SERPL-SCNC: 28 MMOL/L (ref 22–29)
HCT VFR BLD AUTO: 39.1 % (ref 35–47)
HGB BLD-MCNC: 12.9 G/DL (ref 11.7–15.7)
MCH RBC QN AUTO: 29.2 PG (ref 26.5–33)
MCHC RBC AUTO-ENTMCNC: 33 G/DL (ref 31.5–36.5)
MCV RBC AUTO: 89 FL (ref 78–100)
PLATELET # BLD AUTO: 322 10E3/UL (ref 150–450)
POTASSIUM SERPL-SCNC: 4.4 MMOL/L (ref 3.4–5.3)
RBC # BLD AUTO: 4.42 10E6/UL (ref 3.8–5.2)
SODIUM SERPL-SCNC: 142 MMOL/L (ref 135–145)
SPECIMEN EXP DATE BLD: NORMAL
WBC # BLD AUTO: 7.6 10E3/UL (ref 4–11)

## 2025-05-22 PROCEDURE — 86900 BLOOD TYPING SEROLOGIC ABO: CPT

## 2025-05-22 PROCEDURE — 85027 COMPLETE CBC AUTOMATED: CPT

## 2025-05-22 PROCEDURE — 36415 COLL VENOUS BLD VENIPUNCTURE: CPT

## 2025-05-22 PROCEDURE — 86901 BLOOD TYPING SEROLOGIC RH(D): CPT

## 2025-05-22 PROCEDURE — 80048 BASIC METABOLIC PNL TOTAL CA: CPT

## 2025-05-22 PROCEDURE — 86850 RBC ANTIBODY SCREEN: CPT

## 2025-05-22 NOTE — LETTER
5/22/2025    Maxine Bonner, APRN CNP  [address] [facility] [address]    RE: Pamela Engle       Dear Colleague,     I had the pleasure of seeing Pamela Engle in the [facility].  [facility]   [address], [place] [address]   [email or web address]   OFFICE: [phone]     IMPRESSION:  Normal flow, low gradient, moderate to severe aortic valve stenosis (PV 3.4 m/s, MG 27mmHg, and calculated aortic valve area of 0.84 to 0.93 cm2. SVI 41.2ml/m2. DI 0.26).     Functional Capacity:  NYHA class II, CCS class 0   Hyperlipidemia  Osteoporosis        SUMMARY OF RECOMMENDATIONS: We had a long discussion with Ms. Engle regarding natural progression and therapeutic options of calcific aortic valve disease.  Her aortic valve stenosis appears to have progressed, now likely early severe with associated with mild, progressive dyspnea on exertion.  We agreed to proceed with workup including TAVR CT with valve calcium score and coronary angiogram.  Once, we have all the available results, we will contact Danni with a further plan.  In the meantime, patient is scheduled to travel to [place] in 2 weeks, while we advised against this, her overall risk with travel is relatively low (given her robust functional status and early severe aortic valve stenosis).      Dear Maxine Bonner     I had the pleasure of seeing Pamela Engle today at the [facility] for evaluation for Transcatheter Aortic Valve Replacement for symptomatic severe aortic valve stenosis.  Briefly, patient presents for 6-month follow-up, reports progressive dyspnea on exertion. The patient denies symptoms of chest pain, palpitations, dizziness, lightheadedness, presyncope, syncope, orthopnea, PND, early satiety/abdominal bloating, lower extremity edema, stroke like symptoms, or claudication.       ROS:  A  14 point review of symptoms was reviewed.  No prior hx of rheumatic fever or chest irradiation. There were no additional findings pertinent to this encounter.     Past Medical History: as above     Social History: History of tobacco use, quit 14 years ago     Family History: No early family history of cardiovascular disease or sudden cardiac death.  She does have multiple first-degree family members who have underwent coronary artery bypass surgery.    Allergies: reviewed   Allergies   Allergen Reactions     Hydrocodone Swelling       Pertinent Medications:    Current Outpatient Medications   Medication Sig Dispense Refill     alendronate (FOSAMAX) 70 MG tablet TAKE 1 TABLET (70 MG TOTAL) BY MOUTH EVERY 7 DAYS. 12 tablet 3     Calcium-Magnesium-Zinc (HUGO-MAG-ZINC OR) Take 500 mg by mouth 2 times daily       Cholecalciferol (VITAMIN D3) 50 MCG (2000 UT) CAPS Take 2,000 Units by mouth every other day.       ibuprofen (ADVIL/MOTRIN) 200 MG tablet Take 3 tablets (600 mg) by mouth every 6 hours as needed for pain 50 tablet 0     Multiple Vitamins-Minerals (HAIR SKIN AND NAILS FORMULA) TABS Take 1 tablet by mouth daily.       Multiple Vitamins-Minerals (MULTIVITAMIN PO) Take 1 tablet by mouth daily.       rosuvastatin (CRESTOR) 10 MG tablet Take 1 tablet (10 mg) by mouth daily. 90 tablet 3     valACYclovir (VALTREX) 1000 mg tablet TAKE TWO TABLETS BY MOUTH TWICE A DAY FOR ONE DAY AS NEEDED FOR COLD SORES 20 tablet 1     No current facility-administered medications for this visit.       OBJECTIVE:  Physical Examination   /76 (BP Location: Right arm, Patient Position: Sitting, Cuff Size: Adult Regular)   Pulse 70   Resp 14   Wt 68.5 kg (151 lb)   BMI 29.99 kg/m     Constitutional:  NAD  Cognitive: Alert, oriented x 3, Appropriate speech  Chest:  Clear bilaterally   Cardiac: regular rhythm, normal S1/S2, late peaking systolic murmur consistent with severe aortic stenosis. JVP not elevated.    Abdomen:  Soft,  non-tender  Pulses: 2+ distal pulses.  No carotid bruits  Skin and Integument:  No peripheral edema, extremities warm  Neuro:  Grossly normal for motor, sensory.      Studies:     Laboratory performed 09/2024 and reviewed by me personally showed: Serum creatinine 0.74, hemoglobin 12.9, and platelet count 308      Electrocardiogram performed November 2024 and reviewed by me personally showed: Sinus bradycardia, no acute repolarization abnormalities    TTE performed May 2025 and reviewed by me personally showed: Normal left ventricular function with estimated EF 60-65%, normal RV size and function, and normal flow, low gradient, moderate to severe aortic valve stenosis (PV 3.4 m/s, MG 27mmHg, and calculated aortic valve area of 0.84 to 0.93 cm2). SVI 41.2ml/m2. DI 0.26.      Coronary angiogram and TAVR CT pending.     I reviewed the TAVR procedure, risk and expected outcomes with the patient and family members in detail.  I explained that the aortic valve is very significantly impaired, and strenuous exercise should be avoided until after the valve is repaired. Risks associated with transcatheter valve therapy were reviewed. Risks including death, MI, stroke, permanent disability, neurologic injury, renal failure, major bleeding requiring blood transfusion, emergency thoracotomy for catastrophic complications, vascular injury requiring endovascular or surgical repair, need for permanent pacemaker and anesthesia related complications were fully explained.  Alternative approaches including surgical aortic valve replacement and palliative care were discussed.  My own experience with these procedures were reviewed, and the patient and family appeared to understand the information I presented.  Questions were asked and answered to their apparent satisfaction.      I sincerely appreciate the opportunity to participate in this patient's care.  Please do not hesitate to contact me if any questions or concerns arise. The  longitudinal plan of care for the diagnosis(es)/condition(s) as documented were addressed during this visit. Due to the added complexity in care, I will continue to support Danni in the subsequent management and with ongoing continuity of care.      Esau Lyons MD  Interventional Cardiology                                                                                                                                                                                                                           Thank you for allowing me to participate in the care of your patient.      Sincerely,     Esau yLons MD     [facility]  cc:   Esau Lyons MD  [id number] [id number]

## 2025-05-22 NOTE — CONFIDENTIAL NOTE
Valve Clinic TAVR - 5/22/2025  (See consult note from Dr. Lyons)    Referring provider: Padma Begum PA-C    Labs completed at visit today: BMP, CBC, T&S w. extension        Preliminary STS Score: 1.96%      KCCQ12 (date completed 5/22/2025), scanned into chart      PMH: aortic valve stenosis, HLD, osteoarthrosis, chest mass, s/p lumbar fusion,  Fuchs' endothelial dystrophy,      Social: Has a son in [place] who will be her  for angio/TAVR. As well as a 15 year old grandson who will likely stay with her for a few days after her procedure. Otherwise lives alone, typically active. Is going to [place] from 6/7-6/22- [place] per RA. Has 3 siblings who all have needed open heart surgery and she is not interested in SAVR at this point,    Other: no anticoagulation, no devices or CPAP.       TTE date 5/19/2025  EF 60-65 %  AV mean gradient: 27 mmHg  AV Peak Michael: 3.18 m/sec  AV area: 0.84 cm2  AV DIM IND VTI: 0.26  LV SVi: 41.2 ml/m2    Comments on valves moderate to severe valvular aortic stenosis, moderate aortic valve calcification, trace TR, trace MR,       Plan: Pt would like to move forward with workup for TAVR. Patient will need to be scheduled for CTA, angiogram, and CT surgery consult. Preferably angio ASAP and CT prior to her vacation in June.  Orders placed and message sent to scheduling/ to arrange. Patient understands to anticipate a phone call to arrange these appts.     Pt will contact dentist for dental clearance. Pt given clearance form and instructed to have dentist fill out and fax back to clinic. She says she is due for cleaning soon so she will call them today.     Reviewed pre-procedure work up and procedural related education information with patient. All questions answered, no further questions at this time. Patient has my direct contact information as well as the valve clinic line and was encouraged to call with questions or concerns.       Lashawn Latham RN BSN  Valve Clinic  Coordinator  [facility]  [phone]

## 2025-05-23 ENCOUNTER — TELEPHONE (OUTPATIENT)
Dept: CARDIOLOGY | Facility: CLINIC | Age: 75
End: 2025-05-23
Payer: COMMERCIAL

## 2025-05-23 NOTE — CONFIDENTIAL NOTE
[facility]    Lashawn Latham RN on 5/23/2025 at 9:01 AM

## 2025-05-27 ENCOUNTER — PREP FOR PROCEDURE (OUTPATIENT)
Dept: CARDIOLOGY | Facility: CLINIC | Age: 75
End: 2025-05-27
Payer: COMMERCIAL

## 2025-05-27 DIAGNOSIS — I35.0 NONRHEUMATIC AORTIC VALVE STENOSIS: Primary | ICD-10-CM

## 2025-05-27 RX ORDER — FENTANYL CITRATE 50 UG/ML
25 INJECTION, SOLUTION INTRAMUSCULAR; INTRAVENOUS
Status: CANCELLED | OUTPATIENT
Start: 2025-05-28

## 2025-05-27 RX ORDER — ASPIRIN 325 MG
325 TABLET ORAL ONCE
Status: CANCELLED | OUTPATIENT
Start: 2025-05-28 | End: 2025-05-27

## 2025-05-27 RX ORDER — SODIUM CHLORIDE 9 MG/ML
INJECTION, SOLUTION INTRAVENOUS CONTINUOUS
Status: CANCELLED | OUTPATIENT
Start: 2025-05-28

## 2025-05-27 RX ORDER — ASPIRIN 81 MG/1
243 TABLET, CHEWABLE ORAL ONCE
Status: CANCELLED | OUTPATIENT
Start: 2025-05-28

## 2025-05-27 RX ORDER — LIDOCAINE 40 MG/G
CREAM TOPICAL
Status: CANCELLED | OUTPATIENT
Start: 2025-05-27

## 2025-05-27 NOTE — CONFIDENTIAL NOTE
Pre-Procedure Angiogram Education     Pamela Engle  [address] [place] [address]  [phone] (home)     Procedure cardiologist:  Dr. Nelson  PCP:  Maxine Bonner  H&P completed by:  Dr. Lyons   Admit date 05/28  Arrival time:  0630am  Anticoagulation: none  CPAP: No  Previous PCI: none  Bypass Grafts: No  Renal Issues: No  Diabetic?: No  Device?: No  Type:  N/A    Angiogram Teaching    Reason for Visit:  Telephone call to discuss pre-procedure education in preparation for: Jnue    Procedure Prep:  Primary Cardiologist note dated: Dr. Hernandez 11/11/2024.  EKG results obtained, dated: to be obtained date of procedure   T&S- 5/22/2025  BMP, CBC- 5/22/2025    Patient Education  Patient states understanding of procedure and risks and agrees to proceed.    Pre-procedure instructions  Patient instructed to be NPO per anesthesia guidelines; no food after 10:30pm 5/27, clear liquids until 0430am , nothing by mouth after 0430am  Patient instructed to shower the evening before or the morning of the procedure.  Leave all valuables at home (jewlery, rings, watches, large amounts of money).  Patient understands there are two visitors allowed during patients stay.   Patient instructed to arrange for transportation home following procedure from a responsible family member of friend. No driving for at least 24 hours post-procedure.  Patient instructed to have a responsible adult with them for 24 hours post-procedure.  Post-procedure follow up process.  Conscious sedation discussed.    Pre-procedure medication instructions  Patient instructed on antiplatelet medication.  Continue medications as scheduled up until the date of procedure unless indicated below.   Patient instructed to take  Aspirin am of procedure: No  ** Aspirin to be given in [facility] prior to procedure **    Other medication: Hold medications AM of procedure.   Instructed patient to hold all other prescription medications, OTC meds, supplements and vitamins  the day of procedure.       *PATIENTS RECORDS AVAILABLE IN [facility] UNLESS OTHERWISE INDICATED*      Patient Active Problem List   Diagnosis    Chest mass    Aortic valve stenosis    Fuchs' endothelial dystrophy    Hyperlipidemia    Oral herpes    Osteoarthrosis    S/P lumbar fusion    History of rotator cuff surgery       Current Outpatient Medications   Medication Sig Dispense Refill    alendronate (FOSAMAX) 70 MG tablet TAKE 1 TABLET (70 MG TOTAL) BY MOUTH EVERY 7 DAYS. 12 tablet 3    Calcium-Magnesium-Zinc (HUGO-MAG-ZINC OR) Take 500 mg by mouth 2 times daily      Cholecalciferol (VITAMIN D3) 50 MCG (2000 UT) CAPS Take 2,000 Units by mouth every other day.      ibuprofen (ADVIL/MOTRIN) 200 MG tablet Take 3 tablets (600 mg) by mouth every 6 hours as needed for pain 50 tablet 0    Multiple Vitamins-Minerals (HAIR SKIN AND NAILS FORMULA) TABS Take 1 tablet by mouth daily.      Multiple Vitamins-Minerals (MULTIVITAMIN PO) Take 1 tablet by mouth daily.      rosuvastatin (CRESTOR) 10 MG tablet Take 1 tablet (10 mg) by mouth daily. 90 tablet 3    valACYclovir (VALTREX) 1000 mg tablet TAKE TWO TABLETS BY MOUTH TWICE A DAY FOR ONE DAY AS NEEDED FOR COLD SORES 20 tablet 1       Allergies   Allergen Reactions    Hydrocodone Swelling           Orders placed. Patient has my contact information if they have any additional questions or concerns.     Lashawn Latham RN BSN  Valve Clinic Coordinator  [facility]  [phone]

## 2025-05-27 NOTE — CONFIDENTIAL NOTE
Reached out to review angiogram instructions but reached Voicemail. LM to call back    Lashawn Latham RN on 5/27/2025 at 10:18 AM

## 2025-05-28 ENCOUNTER — HOSPITAL ENCOUNTER (OUTPATIENT)
Facility: HOSPITAL | Age: 75
Discharge: HOME | End: 2025-05-28
Attending: INTERNAL MEDICINE | Admitting: INTERNAL MEDICINE
Payer: COMMERCIAL

## 2025-05-28 VITALS
DIASTOLIC BLOOD PRESSURE: 68 MMHG | TEMPERATURE: 97.6 F | RESPIRATION RATE: 16 BRPM | HEART RATE: 73 BPM | WEIGHT: 150 LBS | OXYGEN SATURATION: 98 % | BODY MASS INDEX: 30.24 KG/M2 | SYSTOLIC BLOOD PRESSURE: 159 MMHG | HEIGHT: 59 IN

## 2025-05-28 DIAGNOSIS — I51.89 OTHER ILL-DEFINED HEART DISEASES: ICD-10-CM

## 2025-05-28 DIAGNOSIS — E78.00 PURE HYPERCHOLESTEROLEMIA: Primary | ICD-10-CM

## 2025-05-28 DIAGNOSIS — I35.0 NONRHEUMATIC AORTIC VALVE STENOSIS: ICD-10-CM

## 2025-05-28 LAB
ACT BLD: 186 SECONDS (ref 74–150)
ACT BLD: 418 SECONDS (ref 74–150)
ATRIAL RATE - MUSE: 61 BPM
ATRIAL RATE - MUSE: 64 BPM
DIASTOLIC BLOOD PRESSURE - MUSE: NORMAL MMHG
DIASTOLIC BLOOD PRESSURE - MUSE: NORMAL MMHG
INTERPRETATION ECG - MUSE: NORMAL
INTERPRETATION ECG - MUSE: NORMAL
P AXIS - MUSE: 47 DEGREES
P AXIS - MUSE: 50 DEGREES
PR INTERVAL - MUSE: 152 MS
PR INTERVAL - MUSE: 156 MS
QRS DURATION - MUSE: 102 MS
QRS DURATION - MUSE: 82 MS
QT - MUSE: 418 MS
QT - MUSE: 442 MS
QTC - MUSE: 431 MS
QTC - MUSE: 444 MS
R AXIS - MUSE: 17 DEGREES
R AXIS - MUSE: 26 DEGREES
SYSTOLIC BLOOD PRESSURE - MUSE: NORMAL MMHG
SYSTOLIC BLOOD PRESSURE - MUSE: NORMAL MMHG
T AXIS - MUSE: 64 DEGREES
T AXIS - MUSE: 71 DEGREES
VENTRICULAR RATE- MUSE: 61 BPM
VENTRICULAR RATE- MUSE: 64 BPM

## 2025-05-28 PROCEDURE — 272N000001 HC OR GENERAL SUPPLY STERILE: Performed by: INTERNAL MEDICINE

## 2025-05-28 PROCEDURE — 93458 L HRT ARTERY/VENTRICLE ANGIO: CPT | Mod: 26 | Performed by: INTERNAL MEDICINE

## 2025-05-28 PROCEDURE — 99152 MOD SED SAME PHYS/QHP 5/>YRS: CPT | Performed by: INTERNAL MEDICINE

## 2025-05-28 PROCEDURE — C1725 CATH, TRANSLUMIN NON-LASER: HCPCS | Performed by: INTERNAL MEDICINE

## 2025-05-28 PROCEDURE — 255N000002 HC RX 255 OP 636: Performed by: INTERNAL MEDICINE

## 2025-05-28 PROCEDURE — 258N000003 HC RX IP 258 OP 636: Performed by: INTERNAL MEDICINE

## 2025-05-28 PROCEDURE — C9600 PERC DRUG-EL COR STENT SING: HCPCS | Mod: RC | Performed by: INTERNAL MEDICINE

## 2025-05-28 PROCEDURE — 250N000011 HC RX IP 250 OP 636: Performed by: INTERNAL MEDICINE

## 2025-05-28 PROCEDURE — 85347 COAGULATION TIME ACTIVATED: CPT

## 2025-05-28 PROCEDURE — 99153 MOD SED SAME PHYS/QHP EA: CPT | Performed by: INTERNAL MEDICINE

## 2025-05-28 PROCEDURE — 93458 L HRT ARTERY/VENTRICLE ANGIO: CPT | Performed by: INTERNAL MEDICINE

## 2025-05-28 PROCEDURE — 250N000009 HC RX 250: Mod: JW | Performed by: INTERNAL MEDICINE

## 2025-05-28 PROCEDURE — C1874 STENT, COATED/COV W/DEL SYS: HCPCS | Performed by: INTERNAL MEDICINE

## 2025-05-28 PROCEDURE — 250N000013 HC RX MED GY IP 250 OP 250 PS 637: Performed by: INTERNAL MEDICINE

## 2025-05-28 PROCEDURE — C1894 INTRO/SHEATH, NON-LASER: HCPCS | Performed by: INTERNAL MEDICINE

## 2025-05-28 PROCEDURE — 999N000054 HC STATISTIC EKG NON-CHARGEABLE

## 2025-05-28 PROCEDURE — 93005 ELECTROCARDIOGRAM TRACING: CPT

## 2025-05-28 PROCEDURE — 92928 PRQ TCAT PLMT NTRAC ST 1 LES: CPT | Mod: RC | Performed by: INTERNAL MEDICINE

## 2025-05-28 PROCEDURE — 250N000013 HC RX MED GY IP 250 OP 250 PS 637: Performed by: NURSE PRACTITIONER

## 2025-05-28 PROCEDURE — C1769 GUIDE WIRE: HCPCS | Performed by: INTERNAL MEDICINE

## 2025-05-28 PROCEDURE — C1887 CATHETER, GUIDING: HCPCS | Performed by: INTERNAL MEDICINE

## 2025-05-28 DEVICE — STENT CORONARY SYNERGY XD MR US 4.0X48MM H7493941848400: Type: IMPLANTABLE DEVICE | Status: FUNCTIONAL

## 2025-05-28 RX ORDER — TICAGRELOR 90 MG/1
90 TABLET, FILM COATED ORAL 2 TIMES DAILY
Qty: 180 TABLET | Refills: 3 | Status: SHIPPED | OUTPATIENT
Start: 2025-05-28

## 2025-05-28 RX ORDER — ACETAMINOPHEN 325 MG/1
650 TABLET ORAL EVERY 4 HOURS PRN
Status: DISCONTINUED | OUTPATIENT
Start: 2025-05-28 | End: 2025-05-28 | Stop reason: HOSPADM

## 2025-05-28 RX ORDER — ATROPINE SULFATE 0.1 MG/ML
0.5 INJECTION INTRAVENOUS
Status: DISCONTINUED | OUTPATIENT
Start: 2025-05-28 | End: 2025-05-28 | Stop reason: HOSPADM

## 2025-05-28 RX ORDER — ROSUVASTATIN CALCIUM 40 MG/1
40 TABLET, COATED ORAL DAILY
Status: DISCONTINUED | OUTPATIENT
Start: 2025-05-28 | End: 2025-05-28 | Stop reason: HOSPADM

## 2025-05-28 RX ORDER — IODIXANOL 320 MG/ML
INJECTION, SOLUTION INTRAVASCULAR
Status: DISCONTINUED | OUTPATIENT
Start: 2025-05-28 | End: 2025-05-28 | Stop reason: HOSPADM

## 2025-05-28 RX ORDER — FENTANYL CITRATE 50 UG/ML
25 INJECTION, SOLUTION INTRAMUSCULAR; INTRAVENOUS
Status: DISCONTINUED | OUTPATIENT
Start: 2025-05-28 | End: 2025-05-28 | Stop reason: HOSPADM

## 2025-05-28 RX ORDER — TICAGRELOR 90 MG/1
90 TABLET, FILM COATED ORAL 2 TIMES DAILY
Qty: 180 TABLET | Refills: 3 | Status: SHIPPED | OUTPATIENT
Start: 2025-05-28 | End: 2025-05-28

## 2025-05-28 RX ORDER — LIDOCAINE 40 MG/G
CREAM TOPICAL
Status: DISCONTINUED | OUTPATIENT
Start: 2025-05-28 | End: 2025-05-28 | Stop reason: HOSPADM

## 2025-05-28 RX ORDER — NALOXONE HYDROCHLORIDE 0.4 MG/ML
0.4 INJECTION, SOLUTION INTRAMUSCULAR; INTRAVENOUS; SUBCUTANEOUS
Status: DISCONTINUED | OUTPATIENT
Start: 2025-05-28 | End: 2025-05-28 | Stop reason: HOSPADM

## 2025-05-28 RX ORDER — TICAGRELOR 90 MG/1
TABLET, FILM COATED ORAL
Status: DISCONTINUED | OUTPATIENT
Start: 2025-05-28 | End: 2025-05-28 | Stop reason: HOSPADM

## 2025-05-28 RX ORDER — FLUMAZENIL 0.1 MG/ML
0.2 INJECTION, SOLUTION INTRAVENOUS
Status: DISCONTINUED | OUTPATIENT
Start: 2025-05-28 | End: 2025-05-28 | Stop reason: HOSPADM

## 2025-05-28 RX ORDER — NALOXONE HYDROCHLORIDE 0.4 MG/ML
0.2 INJECTION, SOLUTION INTRAMUSCULAR; INTRAVENOUS; SUBCUTANEOUS
Status: DISCONTINUED | OUTPATIENT
Start: 2025-05-28 | End: 2025-05-28 | Stop reason: HOSPADM

## 2025-05-28 RX ORDER — TICAGRELOR 90 MG/1
90 TABLET, FILM COATED ORAL 2 TIMES DAILY
Status: DISCONTINUED | OUTPATIENT
Start: 2025-05-28 | End: 2025-05-28 | Stop reason: HOSPADM

## 2025-05-28 RX ORDER — ROSUVASTATIN CALCIUM 40 MG/1
40 TABLET, COATED ORAL DAILY
Qty: 90 TABLET | Refills: 3 | Status: SHIPPED | OUTPATIENT
Start: 2025-05-28

## 2025-05-28 RX ORDER — ASPIRIN 81 MG/1
81 TABLET ORAL DAILY
Status: DISCONTINUED | OUTPATIENT
Start: 2025-05-29 | End: 2025-05-28 | Stop reason: HOSPADM

## 2025-05-28 RX ORDER — ASPIRIN 81 MG/1
243 TABLET, CHEWABLE ORAL ONCE
Status: COMPLETED | OUTPATIENT
Start: 2025-05-28 | End: 2025-05-28

## 2025-05-28 RX ORDER — ASPIRIN 325 MG
325 TABLET ORAL ONCE
Status: COMPLETED | OUTPATIENT
Start: 2025-05-28 | End: 2025-05-28

## 2025-05-28 RX ORDER — ASPIRIN 81 MG/1
81 TABLET, CHEWABLE ORAL ONCE
Status: COMPLETED | OUTPATIENT
Start: 2025-05-28 | End: 2025-05-28

## 2025-05-28 RX ORDER — HEPARIN SODIUM 1000 [USP'U]/ML
INJECTION, SOLUTION INTRAVENOUS; SUBCUTANEOUS
Status: DISCONTINUED | OUTPATIENT
Start: 2025-05-28 | End: 2025-05-28 | Stop reason: HOSPADM

## 2025-05-28 RX ORDER — FENTANYL CITRATE 50 UG/ML
INJECTION, SOLUTION INTRAMUSCULAR; INTRAVENOUS
Status: DISCONTINUED | OUTPATIENT
Start: 2025-05-28 | End: 2025-05-28 | Stop reason: HOSPADM

## 2025-05-28 RX ORDER — OXYCODONE HYDROCHLORIDE 5 MG/1
5 TABLET ORAL EVERY 4 HOURS PRN
Status: DISCONTINUED | OUTPATIENT
Start: 2025-05-28 | End: 2025-05-28 | Stop reason: HOSPADM

## 2025-05-28 RX ORDER — HYDRALAZINE HYDROCHLORIDE 20 MG/ML
10 INJECTION INTRAMUSCULAR; INTRAVENOUS EVERY 4 HOURS PRN
Status: DISCONTINUED | OUTPATIENT
Start: 2025-05-28 | End: 2025-05-28 | Stop reason: HOSPADM

## 2025-05-28 RX ORDER — NITROGLYCERIN 0.4 MG/1
0.4 TABLET SUBLINGUAL EVERY 5 MIN PRN
Status: DISCONTINUED | OUTPATIENT
Start: 2025-05-28 | End: 2025-05-28 | Stop reason: HOSPADM

## 2025-05-28 RX ORDER — OXYCODONE HYDROCHLORIDE 5 MG/1
10 TABLET ORAL EVERY 4 HOURS PRN
Status: DISCONTINUED | OUTPATIENT
Start: 2025-05-28 | End: 2025-05-28 | Stop reason: HOSPADM

## 2025-05-28 RX ORDER — ASPIRIN 81 MG/1
81 TABLET ORAL DAILY
Qty: 30 TABLET | Refills: 3 | Status: SHIPPED | OUTPATIENT
Start: 2025-05-29

## 2025-05-28 RX ORDER — SODIUM CHLORIDE 9 MG/ML
INJECTION, SOLUTION INTRAVENOUS CONTINUOUS
Status: ACTIVE | OUTPATIENT
Start: 2025-05-28 | End: 2025-05-28

## 2025-05-28 RX ORDER — METOPROLOL TARTRATE 25 MG/1
12.5 TABLET, FILM COATED ORAL 2 TIMES DAILY
Qty: 30 TABLET | Refills: 12 | Status: SHIPPED | OUTPATIENT
Start: 2025-05-28

## 2025-05-28 RX ORDER — METOPROLOL TARTRATE 1 MG/ML
5 INJECTION, SOLUTION INTRAVENOUS
Status: DISCONTINUED | OUTPATIENT
Start: 2025-05-28 | End: 2025-05-28 | Stop reason: HOSPADM

## 2025-05-28 RX ORDER — ONDANSETRON 4 MG/1
4 TABLET, ORALLY DISINTEGRATING ORAL EVERY 6 HOURS PRN
Status: DISCONTINUED | OUTPATIENT
Start: 2025-05-28 | End: 2025-05-28 | Stop reason: HOSPADM

## 2025-05-28 RX ORDER — ONDANSETRON 2 MG/ML
4 INJECTION INTRAMUSCULAR; INTRAVENOUS EVERY 6 HOURS PRN
Status: DISCONTINUED | OUTPATIENT
Start: 2025-05-28 | End: 2025-05-28 | Stop reason: HOSPADM

## 2025-05-28 RX ORDER — SODIUM CHLORIDE 9 MG/ML
INJECTION, SOLUTION INTRAVENOUS CONTINUOUS
Status: DISCONTINUED | OUTPATIENT
Start: 2025-05-28 | End: 2025-05-28 | Stop reason: HOSPADM

## 2025-05-28 RX ORDER — DIAZEPAM 5 MG/1
5 TABLET ORAL ONCE
Status: COMPLETED | OUTPATIENT
Start: 2025-05-28 | End: 2025-05-28

## 2025-05-28 RX ADMIN — SODIUM CHLORIDE: 0.9 INJECTION, SOLUTION INTRAVENOUS at 07:08

## 2025-05-28 RX ADMIN — DIAZEPAM 5 MG: 5 TABLET ORAL at 07:51

## 2025-05-28 RX ADMIN — SODIUM CHLORIDE: 0.9 INJECTION, SOLUTION INTRAVENOUS at 10:13

## 2025-05-28 RX ADMIN — ASPIRIN 325 MG ORAL TABLET 325 MG: 325 PILL ORAL at 07:51

## 2025-05-28 ASSESSMENT — ACTIVITIES OF DAILY LIVING (ADL)
ADLS_ACUITY_SCORE: 41

## 2025-05-28 ASSESSMENT — EJECTION FRACTION: EF_VALUE: .29

## 2025-05-28 NOTE — PRE-PROCEDURE
GENERAL PRE-PROCEDURE:   Procedure:  Coronary angiogram with possible PCI  Date/Time:  5/28/2025 7:02 AM    Written consent obtained?: Yes    Risks and benefits: Risks, benefits and alternatives were discussed    Consent given by:  Patient  Patient states understanding of procedure being performed: Yes    Patient's understanding of procedure matches consent: Yes    Procedure consent matches procedure scheduled: Yes    Expected level of sedation:  Moderate  Appropriately NPO:  Yes  ASA Class:  4 (valvular heart disease; LFLG moderate-severe aortic stenosis, HLD)  Mallampati  :  Grade 4- soft palate obscured by base of tongue  Lungs:  Lungs clear with good breath sounds bilaterally  Heart:  Systolic murmur  History & Physical reviewed:  History and physical reviewed and updates made (see comment)  H&P Comments:  Clinically Significant Risk Factors Present on Admission    Cardiovascular: valvular heart disease; LFLG moderate-severe aortic stenosis, HLD    Fluid & Electrolyte Disorders : Not present on admission    Gastroenterology : Not present on admission    Hematology/Oncology : Not present on admission    Nephrology : Not present on admission    Neurology : Not present on admission    Pulmonology : Not present on admission    Systemic : Not present on admission    Statement of review:  I have reviewed the lab findings, diagnostic data, medications, and the plan for sedation

## 2025-05-28 NOTE — Clinical Note
Max pressure = 8 ayo. Total duration = 4 seconds.     Max pressure = 12 ayo. Total duration = 4 seconds.    Balloon reinflated a second time: Max pressure = 12 ayo. Total duration = 4 seconds.  Balloon reinflated a third time: Max pressure = 12 ayo. Total duration = 4 seconds.

## 2025-05-28 NOTE — Clinical Note
Max pressure = 10 ayo. Total duration = 7 seconds.     Max pressure = 6 ayo. Total duration = 3 seconds.    Balloon reinflated a second time: Max pressure = 6 ayo. Total duration = 3 seconds.  Balloon reinflated a third time: Max pressure = 6 ayo. Total duration = 4 seconds.

## 2025-05-28 NOTE — PROGRESS NOTES
Patient is kept comfortable during post-procedure stay. VSS. Denies pain. Right radial access site remains dry & free from signs of bleeding. Tolerated food and fluids. Ambulated without issues. Appointments made & included in AVS. Dr. Maguire was able to speak with patient post procedure. Post-op instructions reviewed and packet given to patient & spouse. Able to ask questions. Verbalized no concerns. Belongings returned. Discharged in stable condition, accompanied by son.

## 2025-05-29 ENCOUNTER — MYC MEDICAL ADVICE (OUTPATIENT)
Dept: CARDIOLOGY | Facility: CLINIC | Age: 75
End: 2025-05-29
Payer: COMMERCIAL

## 2025-06-03 ENCOUNTER — HOSPITAL ENCOUNTER (OUTPATIENT)
Dept: CARDIAC REHAB | Facility: CLINIC | Age: 75
Discharge: HOME | End: 2025-06-03
Attending: INTERNAL MEDICINE
Payer: COMMERCIAL

## 2025-06-03 PROCEDURE — 93797 PHYS/QHP OP CAR RHAB WO ECG: CPT

## 2025-06-03 PROCEDURE — 93798 PHYS/QHP OP CAR RHAB W/ECG: CPT

## 2025-06-05 ENCOUNTER — HOSPITAL ENCOUNTER (OUTPATIENT)
Dept: CT IMAGING | Facility: CLINIC | Age: 75
End: 2025-06-05
Attending: INTERNAL MEDICINE
Payer: COMMERCIAL

## 2025-06-05 DIAGNOSIS — I35.0 NONRHEUMATIC AORTIC VALVE STENOSIS: ICD-10-CM

## 2025-06-05 PROCEDURE — 74174 CTA ABD&PLVS W/CONTRAST: CPT

## 2025-06-05 PROCEDURE — 250N000011 HC RX IP 250 OP 636: Performed by: INTERNAL MEDICINE

## 2025-06-05 RX ORDER — IOPAMIDOL 755 MG/ML
100 INJECTION, SOLUTION INTRAVASCULAR ONCE
Status: COMPLETED | OUTPATIENT
Start: 2025-06-05 | End: 2025-06-05

## 2025-06-05 RX ADMIN — IOPAMIDOL 100 ML: 755 INJECTION, SOLUTION INTRAVENOUS at 09:42

## 2025-06-06 ENCOUNTER — HOSPITAL ENCOUNTER (OUTPATIENT)
Dept: CARDIAC REHAB | Facility: CLINIC | Age: 75
Discharge: HOME | End: 2025-06-06
Attending: INTERNAL MEDICINE
Payer: COMMERCIAL

## 2025-06-06 PROCEDURE — 93798 PHYS/QHP OP CAR RHAB W/ECG: CPT

## 2025-06-09 ENCOUNTER — HOSPITAL ENCOUNTER (OUTPATIENT)
Dept: CARDIAC REHAB | Facility: CLINIC | Age: 75
Discharge: HOME | End: 2025-06-09
Attending: INTERNAL MEDICINE
Payer: COMMERCIAL

## 2025-06-09 PROCEDURE — 93797 PHYS/QHP OP CAR RHAB WO ECG: CPT

## 2025-06-09 PROCEDURE — 93798 PHYS/QHP OP CAR RHAB W/ECG: CPT

## 2025-06-11 ENCOUNTER — RESULTS FOLLOW-UP (OUTPATIENT)
Dept: CARDIOLOGY | Facility: CLINIC | Age: 75
End: 2025-06-11

## 2025-06-11 ENCOUNTER — TELEPHONE (OUTPATIENT)
Dept: CARDIOLOGY | Facility: CLINIC | Age: 75
End: 2025-06-11
Payer: COMMERCIAL

## 2025-06-11 DIAGNOSIS — I35.0 NONRHEUMATIC AORTIC VALVE STENOSIS: Primary | ICD-10-CM

## 2025-06-11 NOTE — TELEPHONE ENCOUNTER
"Valve Clinic RN Phone Call:  Call returned on 6/11/2025 at 4:15 PM by Lashawn Latham RN    Reason for call: review CT results/RA recommendations    Summary of conversation: Patient returned call to review CT results. I let her know of her aortic valve calcium score came back moderate and that she would be a candidate for transfemoral access if proceeding with TAVR in future. When inquiring about symptoms post PCI Danni states she has been seeing cardiac rehab and feels that her symptoms have improved significantly. State she is feeling \"much better.\" I relayed recommendations from DR. Lyons that we can cancel current plan for TAVR and instead schedule repeat TTE and valve clinic visit for the end of August. I did notify her that she should contact us if any symptoms return or progress and that she should continue to follow up with cardiology as scheduled on 6/17 but we can cancel her CT surgery consult and TAVR pre op appointments. Danni was happy to hear this and will follow up when needed in August.     Additional comments/Actions: will place TTE order, and message  to help schedule follow up valve clinic appointment as well as cancel CT surgery consult.     Instructions given to patient: Follow up with TTE and valve clinic appt in August.     Lashawn Latham RN on 6/11/2025 at 4:15 PM       "

## 2025-06-13 ENCOUNTER — TRANSFERRED RECORDS (OUTPATIENT)
Dept: HEALTH INFORMATION MANAGEMENT | Facility: CLINIC | Age: 75
End: 2025-06-13
Payer: COMMERCIAL

## 2025-06-13 ENCOUNTER — HOSPITAL ENCOUNTER (OUTPATIENT)
Dept: CARDIAC REHAB | Facility: CLINIC | Age: 75
Discharge: HOME | End: 2025-06-13
Attending: INTERNAL MEDICINE
Payer: COMMERCIAL

## 2025-06-13 PROCEDURE — 93798 PHYS/QHP OP CAR RHAB W/ECG: CPT

## 2025-06-16 ENCOUNTER — HOSPITAL ENCOUNTER (OUTPATIENT)
Dept: CARDIAC REHAB | Facility: CLINIC | Age: 75
Discharge: HOME | End: 2025-06-16
Attending: INTERNAL MEDICINE
Payer: COMMERCIAL

## 2025-06-16 PROCEDURE — 93798 PHYS/QHP OP CAR RHAB W/ECG: CPT

## 2025-06-17 ENCOUNTER — OFFICE VISIT (OUTPATIENT)
Dept: CARDIOLOGY | Facility: CLINIC | Age: 75
End: 2025-06-17
Payer: COMMERCIAL

## 2025-06-17 VITALS
HEART RATE: 83 BPM | SYSTOLIC BLOOD PRESSURE: 115 MMHG | WEIGHT: 152 LBS | RESPIRATION RATE: 14 BRPM | DIASTOLIC BLOOD PRESSURE: 60 MMHG | BODY MASS INDEX: 30.7 KG/M2

## 2025-06-17 DIAGNOSIS — I25.10 CORONARY ARTERY DISEASE INVOLVING NATIVE CORONARY ARTERY OF NATIVE HEART WITHOUT ANGINA PECTORIS: ICD-10-CM

## 2025-06-17 DIAGNOSIS — I35.0 NONRHEUMATIC AORTIC VALVE STENOSIS: Primary | ICD-10-CM

## 2025-06-17 DIAGNOSIS — Z95.5 S/P DRUG ELUTING CORONARY STENT PLACEMENT: ICD-10-CM

## 2025-06-17 DIAGNOSIS — E78.2 MIXED HYPERLIPIDEMIA: ICD-10-CM

## 2025-06-17 PROCEDURE — 3078F DIAST BP <80 MM HG: CPT

## 2025-06-17 PROCEDURE — 3074F SYST BP LT 130 MM HG: CPT

## 2025-06-17 PROCEDURE — G2211 COMPLEX E/M VISIT ADD ON: HCPCS

## 2025-06-17 PROCEDURE — 99214 OFFICE O/P EST MOD 30 MIN: CPT

## 2025-06-17 RX ORDER — ATORVASTATIN CALCIUM 80 MG/1
80 TABLET, FILM COATED ORAL DAILY
Qty: 90 TABLET | Refills: 3 | Status: SHIPPED | OUTPATIENT
Start: 2025-06-17

## 2025-06-17 NOTE — PROGRESS NOTES
HEART CARE ENCOUNTER NOTE      [facility]  [phone]    Primary Care: Maxine Bonner  Primary Cardiologist: Dr. Lyons    Assessment/Recommendations   Assessment:  Moderately severe aortic stenosis  Mean gradient 27, SABRINA 0.93, DI 0.26 on echo May 2025  Symptoms of AYERS improved after RILEY to RCA, TAVR postponed with plans for monitoring  CAD  S/p RILEY to the RCA on 5/28/2025  Denies anginal symptoms  Currently on DAPT with aspirin and Brilinta, cath site well-healed, working with cardiac rehab  Brilinta was expensive for her first fill, may wish to switch to alternative if future refills will be just as expensive  HTN  Well-controlled  HLD  , Crestor recently increased to 40 mg  Patient has known intolerance to Crestor at higher doses than 10 mg.  Will try switching to atorvastatin    Plan:  Switch Crestor to Lipitor 80 mg daily  CMP 1 week, FLP 3 months  If intolerant of Lipitor switch back to Crestor and reduce to 10 mg (last known tolerable dose) and consider adding on Repatha  If Brilinta is cost prohibitive on next refill, switch to Plavix with loading dose   Updated TTE and follow-up with valve clinic in August for follow-up on aortic stenosis    Follow up with Dr. Lyons on 8/7/2025.      The longitudinal plan of care for the diagnosis(es)/condition(s) as documented were addressed during this visit. Due to the added complexity in care, I will continue to support Danni in the subsequent management and with ongoing continuity of care.      History of Present Illness/Subjective     Pamela Engle is a 74 year old female with PMHx of aortic stenosis, CAD with PCI, HTN, HLD presenting for follow-up.      She was last seen by Padma Begum on 11/11/2024.  At that time, patient was doing well without any cardiac concerns.  She was referred to the valve clinic to discuss her moderately severe aortic stenosis.  Initially seen in the valve clinic in November 2024 and was asymptomatic.  She  was recommended routine monitoring.  Recently seen on 5/22/2025 with progression of her aortic stenosis to severe range associated with progressive AYERS.  TAVR workup revealed single-vessel obstructive CAD in the RCA which was stented.  After her PCI, her symptoms improved and TAVR workup was put on hold    Since her PCI she reports feeling well and has no recurrence of shortness of breath.  She also notes her shortness of breath was minimal to begin with.  She is working with cardiac rehab without any limitations.  She notes significant myalgias with the elevated Crestor dose she is on.  She has had intolerance to this medication in the past and is only able to tolerate 10 mg.  She has not been on any other statins or lipid-lowering therapies.  She also reports that her Brilinta was expensive to fill.     She denies fatigue, lightheadedness, shortness of breath, dyspnea on exertion, orthopnea, PND, palpitations, chest pain, abdominal fullness/bloating, and lower extremity edema.     Cardiac Testing     ECG 5/28/2025: Sinus rhythm      Echo:    TTE, 5/19/2025  The visual ejection fraction is 60-65%.  The right ventricle is normal in size and function.  Moderate to severe valvular aortic stenosis. The mean AoV pressure gradient is  27mmHg. Velocity ratio 0.26. SABRINA 0.93 cm^2.  Compared to the prior study dated 11/4/2024, there are changes as noted. There  has been progression of the aortic valve stenosis.      Cardiac Cath 5/28/2025:  - single-vessel obstructive CAD s/p DESx1 to RCA; normal L-sided filling pressures; AV invasive gradient similar to echo  - ASA 81mg daily indefinitely, ticagrelor 180mg once, followed by 90mg twice daily for at least 12 months  - add BB, increase rosuva to 40  - proceed w/ TAVR v. SAVR w/up as planned  - continue aggressive risk factor modification    Labs:    Creatinine 0.77  Potassium 4.4  Hemoglobin 12.9  Platelet 322        Physical Examination    Vitals: /60 (BP  Location: Right arm, Patient Position: Sitting, Cuff Size: Adult Regular)   Pulse 83   Resp 14   Wt 68.9 kg (152 lb)   BMI 30.70 kg/m      General: Well appearing, in no acute distress. Resting comfortably in exam chair  Skin: No clubbing, no cyanosis.  Eyes: Extra ocular movements intact  Neck: No jugular venous distention, no carotid bruits, carotids have a normal upstroke  Lungs: Clear to auscultation bilaterally, no wheezing or rhonchi.  Heart: Regular rhythm, PMI not displaced, S1, S2 normal, no S3, no S4, no heaves, no rub and 3-6 systolic murmur.  Abdomen: Soft, nontender  Extremities: No peripheral edema . Grade 2/4 distal pulses bilaterally.  Neuro: Oriented to person, place and time, alert, cooperative, gait coordinated.    BP Readings from Last 3 Encounters:   06/17/25 115/60   05/28/25 (!) 159/68   05/22/25 135/76       Pulse Readings from Last 3 Encounters:   06/17/25 83   05/28/25 73   05/22/25 70       Wt Readings from Last 3 Encounters:   06/17/25 68.9 kg (152 lb)   05/28/25 68 kg (150 lb)   05/22/25 68.5 kg (151 lb)         Review of Systems      Please refer above for cardiac ROS details.       History     MEDICAL HISTORY:  Past Medical History:   Diagnosis Date    Anemia 2012    after surgery    Aortic stenosis     cardiology visit follow up due Fall of 2021    Arthritis     Complication of anesthesia     Gastroesophageal reflux disease     rarely    Hepatitis     hepatitis A as a child    History of anesthesia complications     Tremors after surgery     Primary insomnia 10/5/2020     SURGICAL HISTORY  Past Surgical History:   Procedure Laterality Date    ARTHROSCOPY KNEE Left 2003    ARTHROSCOPY KNEE Right 2010    BIOPSY  2001    bone marrow donator    BUNIONECTOMY PORTER Bilateral 1990's    and hammer toe surgery    CV CORONARY ANGIOGRAM N/A 5/28/2025    Procedure: Coronary Angiogram;  Surgeon: Norris Maguire MD;  Location: [facility] CATH LAB CV    CV LEFT HEART CATH N/A 5/28/2025     Procedure: Left Heart Catheterization;  Surgeon: Norris Maguire MD;  Location: [facility] CATH LAB CV    CV PCI STENT DRUG ELUTING N/A 5/28/2025    Procedure: Percutaneous Coronary Intervention Stent;  Surgeon: Norris Maguire MD;  Location: [facility] CATH LAB CV    ENT SURGERY  1950's    EXCISE MASS TRUNK N/A 11/5/2019    Procedure: EXCISION SUBCUTANEOUS MASS UPPER CHEST MIDLINE;  Surgeon: aKrla Vasquez MD;  Location: [facility] OR    EXCISE CASTANO'S NEUROMA FOOT  2007 and  redo 2009    GYN SURGERY  1984    tubal ligation    ORTHOPEDIC SURGERY Right 01/2019    rotator cuff repair    SOFT TISSUE SURGERY  2012    lipoma removed from back      FAMILY HISTORY:  Family History   Problem Relation Age of Onset    CABG Mother 77    Arthritis Mother     Kidney Disease Mother     Pacemaker Mother     Colon Cancer Father     Hypertension Father     Hyperlipidemia Father     Prostate Cancer Father     Dementia Father     CABG Father     CABG Sister 58    Diabetes Sister     Coronary Stenting Sister 63        2 stents    Substance Abuse Brother     Myocardial Infarction Brother     CABG Brother     Liver Cancer Maternal Grandmother     Breast Cancer Paternal Grandmother     Lung Cancer Paternal Grandfather     FAMILY HISTORY:  Family History   Problem Relation Age of Onset    CABG Mother 77    Arthritis Mother     Kidney Disease Mother     Pacemaker Mother     Colon Cancer Father     Hypertension Father     Hyperlipidemia Father     Prostate Cancer Father     Dementia Father     CABG Father     CABG Sister 58    Diabetes Sister     Coronary Stenting Sister 63        2 stents    Substance Abuse Brother     Myocardial Infarction Brother     CABG Brother     Liver Cancer Maternal Grandmother     Breast Cancer Paternal Grandmother     Lung Cancer Paternal Grandfather       SOCIAL HISTORY:  Social History     Socioeconomic History    Marital status:      Spouse name: Not on file    Number of children: Not on file    Years  of education: Not on file    Highest education level: Not on file   Occupational History    Not on file   Tobacco Use    Smoking status: Former     Current packs/day: 0.00     Average packs/day: 1 pack/day for 41.1 years (41.1 ttl pk-yrs)     Types: Cigarettes     Start date: 1/1/1969     Quit date: 2/1/2010     Years since quitting: 15.3    Smokeless tobacco: Never   Vaping Use    Vaping status: Never Used   Substance and Sexual Activity    Alcohol use: Yes     Alcohol/week: 2.0 standard drinks of alcohol     Comment: 1-2 drinks per week    Drug use: No    Sexual activity: Not Currently   Other Topics Concern    Not on file   Social History Narrative    Not on file     Social Drivers of Health     Financial Resource Strain: Low Risk  (9/7/2024)    Financial Resource Strain     Within the past 12 months, have you or your family members you live with been unable to get utilities (heat, electricity) when it was really needed?: No   Food Insecurity: No Food Insecurity (10/2/2024)    Received from [facility]    Hunger Vital Sign     Worried About Running Out of Food in the Last Year: Never true     Ran Out of Food in the Last Year: Never true   Transportation Needs: No Transportation Needs (10/2/2024)    Received from [facility]    PRAPARE - Transportation     Lack of Transportation (Medical): No     Lack of Transportation (Non-Medical): No   Physical Activity: Insufficiently Active (10/2/2024)    Received from [facility]    Exercise Vital Sign     Days of Exercise per Week: 2 days     Minutes of Exercise per Session: 20 min   Stress: No Stress Concern Present (9/7/2024)    [employer] [employer] [facility] - Occupational Stress Questionnaire     Feeling of Stress : Not at all   Social Connections: Unknown (9/7/2024)    Social Connection and Isolation Panel [NHANES]     Frequency of Communication with Friends and Family: Not on file     Frequency of Social Gatherings with Friends and Family: Twice a week      Attends Restorationism Services: Not on file     Active Member of Clubs or Organizations: Not on file     Attends Club or Organization Meetings: Not on file     Marital Status: Not on file   Interpersonal Safety: Low Risk  (5/28/2025)    Interpersonal Safety     Do you feel physically and emotionally safe where you currently live?: Yes     Within the past 12 months, have you been hit, slapped, kicked or otherwise physically hurt by someone?: No     Within the past 12 months, have you been humiliated or emotionally abused in other ways by your partner or ex-partner?: No   Housing Stability: Low Risk  (10/2/2024)    Received from [facility]    Housing Stability     What is your living situation today?: I have a steady place to live    ALLERGIES:  Allergies   Allergen Reactions    Hydrocodone Swelling            Medications:     Current Outpatient Medications   Medication Sig Dispense Refill    alendronate (FOSAMAX) 70 MG tablet TAKE 1 TABLET (70 MG TOTAL) BY MOUTH EVERY 7 DAYS. 12 tablet 3    aspirin 81 MG EC tablet Take 1 tablet (81 mg) by mouth daily. Start tomorrow. 30 tablet 3    atorvastatin (LIPITOR) 80 MG tablet Take 1 tablet (80 mg) by mouth daily. 90 tablet 3    Calcium-Magnesium-Zinc (HUGO-MAG-ZINC OR) Take 500 mg by mouth 2 times daily      Cholecalciferol (VITAMIN D3) 50 MCG (2000 UT) CAPS Take 2,000 Units by mouth every other day.      ibuprofen (ADVIL/MOTRIN) 200 MG tablet Take 3 tablets (600 mg) by mouth every 6 hours as needed for pain 50 tablet 0    metoprolol tartrate (LOPRESSOR) 25 MG tablet Take 0.5 tablets (12.5 mg) by mouth 2 times daily. 30 tablet 12    Multiple Vitamins-Minerals (HAIR SKIN AND NAILS FORMULA) TABS Take 1 tablet by mouth daily.      Multiple Vitamins-Minerals (MULTIVITAMIN PO) Take 1 tablet by mouth daily.      ticagrelor (BRILINTA) 90 MG tablet Take 1 tablet (90 mg) by mouth 2 times daily. Start this evening. 180 tablet 3    valACYclovir (VALTREX) 1000 mg tablet TAKE TWO TABLETS BY  MOUTH TWICE A DAY FOR ONE DAY AS NEEDED FOR COLD SORES 20 tablet 1           Henry Swain PA-C  June 17, 2025    This note was partially generated using Dragon voice recognition system, and there may be some incorrect words,  spellings, and punctuation that were not noted in checking the note before saving.

## 2025-06-17 NOTE — PATIENT INSTRUCTIONS
It was great to see you today!.    Recommendations:  Change crestor to Lipitor 80mg  Repeat labwork in 1 week to recheck kidney function, potassium, liver function   We will recheck cholesterol in 3 months  Let us know if the Brilinta is too expensive and we can switch to plavix    Continue cardiac rehab    Follow up with Dr. Lyons in August as scheduled.      If you have questions, concerns, or new concerning symptoms prior to your next appointment, please contact the Cardiology Nursing team at [phone].    For scheduling issues/changes, please call [phone].

## 2025-06-17 NOTE — LETTER
6/17/2025    Maxine Bonner, APRN CNP  [address] [facility] [address]    RE: Pamela Engle       Dear Colleague,     I had the [facility] of seeing Pam[facility].  HEART CARE ENCOUNTER NOTE      [facility]  [phone]    Primary Care: Maxine Bonner  Primary Cardiologist: Dr. Lyons    Assessment/Recommendations   Assessment:  Moderately severe aortic stenosis  Mean gradient 27, SABRINA 0.93, DI 0.26 on echo May 2025  Symptoms of AYERS improved after RILEY to RCA, TAVR postponed with plans for monitoring  CAD  S/p RILEY to the RCA on 5/28/2025  Denies anginal symptoms  Currently on DAPT with aspirin and Brilinta, cath site well-healed, working with cardiac rehab  Brilinta was expensive for her first fill, may wish to switch to alternative if future refills will be just as expensive  HTN  Well-controlled  HLD  , Crestor recently increased to 40 mg  Patient has known intolerance to Crestor at higher doses than 10 mg.  Will try switching to atorvastatin    Plan:  Switch Crestor to Lipitor 80 mg daily  CMP 1 week, FLP 3 months  If intolerant of Lipitor switch back to Crestor and reduce to 10 mg (last known tolerable dose) and consider adding on Repatha  If Brilinta is cost prohibitive on next refill, switch to Plavix with loading dose   Updated TTE and follow-up with valve clinic in August for follow-up on aortic stenosis    Follow up with Dr. Lyons on 8/7/2025.      The longitudinal plan of care for the diagnosis(es)/condition(s) as documented were addressed during this visit. Due to the added complexity in care, I will continue to support Danni in the subsequent management and with ongoing continuity of care.      History of Present Illness/Subjective     Pamela Engle is a 74 year old female with PMHx of aortic stenosis, CAD with PCI, HTN, HLD presenting for follow-up.      She was last seen by Padma Begum on 11/11/2024.  At that time, patient  was doing well without any cardiac concerns.  She was referred to the valve clinic to discuss her moderately severe aortic stenosis.  Initially seen in the valve clinic in November 2024 and was asymptomatic.  She was recommended routine monitoring.  Recently seen on 5/22/2025 with progression of her aortic stenosis to severe range associated with progressive AYERS.  TAVR workup revealed single-vessel obstructive CAD in the RCA which was stented.  After her PCI, her symptoms improved and TAVR workup was put on hold    Since her PCI she reports feeling well and has no recurrence of shortness of breath.  She also notes her shortness of breath was minimal to begin with.  She is working with cardiac rehab without any limitations.  She notes significant myalgias with the elevated Crestor dose she is on.  She has had intolerance to this medication in the past and is only able to tolerate 10 mg.  She has not been on any other statins or lipid-lowering therapies.  She also reports that her Brilinta was expensive to fill.     She denies fatigue, lightheadedness, shortness of breath, dyspnea on exertion, orthopnea, PND, palpitations, chest pain, abdominal fullness/bloating, and lower extremity edema.     Cardiac Testing     ECG 5/28/2025: Sinus rhythm      Echo:    TTE, 5/19/2025  The visual ejection fraction is 60-65%.  The right ventricle is normal in size and function.  Moderate to severe valvular aortic stenosis. The mean AoV pressure gradient is  27mmHg. Velocity ratio 0.26. SABRINA 0.93 cm^2.  Compared to the prior study dated 11/4/2024, there are changes as noted. There  has been progression of the aortic valve stenosis.      Cardiac Cath 5/28/2025:  - single-vessel obstructive CAD s/p DESx1 to RCA; normal L-sided filling pressures; AV invasive gradient similar to echo  - ASA 81mg daily indefinitely, ticagrelor 180mg once, followed by 90mg twice daily for at least 12 months  - add BB, increase rosuva to 40  - proceed w/ TAVR  juancarlos BROWNING w/up as planned  - continue aggressive risk factor modification    Labs:    Creatinine 0.77  Potassium 4.4  Hemoglobin 12.9  Platelet 322        Physical Examination    Vitals: /60 (BP Location: Right arm, Patient Position: Sitting, Cuff Size: Adult Regular)   Pulse 83   Resp 14   Wt 68.9 kg (152 lb)   BMI 30.70 kg/m      General: Well appearing, in no acute distress. Resting comfortably in exam chair  Skin: No clubbing, no cyanosis.  Eyes: Extra ocular movements intact  Neck: No jugular venous distention, no carotid bruits, carotids have a normal upstroke  Lungs: Clear to auscultation bilaterally, no wheezing or rhonchi.  Heart: Regular rhythm, PMI not displaced, S1, S2 normal, no S3, no S4, no heaves, no rub and 3-6 systolic murmur.  Abdomen: Soft, nontender  Extremities: No peripheral edema . Grade 2/4 distal pulses bilaterally.  Neuro: Oriented to person, place and time, alert, cooperative, gait coordinated.    BP Readings from Last 3 Encounters:   06/17/25 115/60   05/28/25 (!) 159/68   05/22/25 135/76       Pulse Readings from Last 3 Encounters:   06/17/25 83   05/28/25 73   05/22/25 70       Wt Readings from Last 3 Encounters:   06/17/25 68.9 kg (152 lb)   05/28/25 68 kg (150 lb)   05/22/25 68.5 kg (151 lb)         Review of Systems      Please refer above for cardiac ROS details.       History     MEDICAL HISTORY:  Past Medical History:   Diagnosis Date     Anemia 2012    after surgery     Aortic stenosis     cardiology visit follow up due Fall of 2021     Arthritis      Complication of anesthesia      Gastroesophageal reflux disease     rarely     Hepatitis     hepatitis A as a child     History of anesthesia complications     Tremors after surgery      Primary insomnia 10/5/2020     SURGICAL HISTORY  Past Surgical History:   Procedure Laterality Date     ARTHROSCOPY KNEE Left 2003     ARTHROSCOPY KNEE Right 2010     BIOPSY  2001    bone marrow donator     BUNIONECTOMY PORTER  Bilateral 1990's    and hammer toe surgery     CV CORONARY ANGIOGRAM N/A 5/28/2025    Procedure: Coronary Angiogram;  Surgeon: Norris Maguire MD;  Location: [facility] CATH LAB CV     CV LEFT HEART CATH N/A 5/28/2025    Procedure: Left Heart Catheterization;  Surgeon: Norris Maguire MD;  Location: [facility] CATH LAB CV     CV PCI STENT DRUG ELUTING N/A 5/28/2025    Procedure: Percutaneous Coronary Intervention Stent;  Surgeon: Norris Maguire MD;  Location: [facility] CATH LAB CV     ENT SURGERY  1950's     EXCISE MASS TRUNK N/A 11/5/2019    Procedure: EXCISION SUBCUTANEOUS MASS UPPER CHEST MIDLINE;  Surgeon: Karla Vasquez MD;  Location: [facility] OR     EXCISE CASTANO'S NEUROMA FOOT  2007 and  redo 2009     GYN SURGERY  1984    tubal ligation     ORTHOPEDIC SURGERY Right 01/2019    rotator cuff repair     SOFT TISSUE SURGERY  2012    lipoma removed from back      FAMILY HISTORY:  Family History   Problem Relation Age of Onset     CABG Mother 77     Arthritis Mother      Kidney Disease Mother      Pacemaker Mother      Colon Cancer Father      Hypertension Father      Hyperlipidemia Father      Prostate Cancer Father      Dementia Father      CABG Father      CABG Sister 58     Diabetes Sister      Coronary Stenting Sister 63        2 stents     Substance Abuse Brother      Myocardial Infarction Brother      CABG Brother      Liver Cancer Maternal Grandmother      Breast Cancer Paternal Grandmother      Lung Cancer Paternal Grandfather     FAMILY HISTORY:  Family History   Problem Relation Age of Onset     CABG Mother 77     Arthritis Mother      Kidney Disease Mother      Pacemaker Mother      Colon Cancer Father      Hypertension Father      Hyperlipidemia Father      Prostate Cancer Father      Dementia Father      CABG Father      CABG Sister 58     Diabetes Sister      Coronary Stenting Sister 63        2 stents     Substance Abuse Brother      Myocardial Infarction Brother      CABG Brother      Liver  Cancer Maternal Grandmother      Breast Cancer Paternal Grandmother      Lung Cancer Paternal Grandfather       SOCIAL HISTORY:  Social History     Socioeconomic History     Marital status:      Spouse name: Not on file     Number of children: Not on file     Years of education: Not on file     Highest education level: Not on file   Occupational History     Not on file   Tobacco Use     Smoking status: Former     Current packs/day: 0.00     Average packs/day: 1 pack/day for 41.1 years (41.1 ttl pk-yrs)     Types: Cigarettes     Start date: 1/1/1969     Quit date: 2/1/2010     Years since quitting: 15.3     Smokeless tobacco: Never   Vaping Use     Vaping status: Never Used   Substance and Sexual Activity     Alcohol use: Yes     Alcohol/week: 2.0 standard drinks of alcohol     Comment: 1-2 drinks per week     Drug use: No     Sexual activity: Not Currently   Other Topics Concern     Not on file   Social History Narrative     Not on file     Social Drivers of Health     Financial Resource Strain: Low Risk  (9/7/2024)    Financial Resource Strain      Within the past 12 months, have you or your family members you live with been unable to get utilities (heat, electricity) when it was really needed?: No   Food Insecurity: No Food Insecurity (10/2/2024)    Received from [facility]    Hunger Vital Sign      Worried About Running Out of Food in the Last Year: Never true      Ran Out of Food in the Last Year: Never true   Transportation Needs: No Transportation Needs (10/2/2024)    Received from [facility]    PRAPARE - Transportation      Lack of Transportation (Medical): No      Lack of Transportation (Non-Medical): No   Physical Activity: Insufficiently Active (10/2/2024)    Received from [facility]    Exercise Vital Sign      Days of Exercise per Week: 2 days      Minutes of Exercise per Session: 20 min   Stress: No Stress Concern Present (9/7/2024)    [employer] - Occupational  Stress Questionnaire      Feeling of Stress : Not at all   Social Connections: Unknown (9/7/2024)    Social Connection and Isolation Panel [NHANES]      Frequency of Communication with Friends and Family: Not on file      Frequency of Social Gatherings with Friends and Family: Twice a week      Attends Gnosticism Services: Not on file      Active Member of Clubs or Organizations: Not on file      Attends Club or Organization Meetings: Not on file      Marital Status: Not on file   Interpersonal Safety: Low Risk  (5/28/2025)    Interpersonal Safety      Do you feel physically and emotionally safe where you currently live?: Yes      Within the past 12 months, have you been hit, slapped, kicked or otherwise physically hurt by someone?: No      Within the past 12 months, have you been humiliated or emotionally abused in other ways by your partner or ex-partner?: No   Housing Stability: Low Risk  (10/2/2024)    Received from [facility]    Housing Stability      What is your living situation today?: I have a steady place to live    ALLERGIES:  Allergies   Allergen Reactions     Hydrocodone Swelling            Medications:     Current Outpatient Medications   Medication Sig Dispense Refill     alendronate (FOSAMAX) 70 MG tablet TAKE 1 TABLET (70 MG TOTAL) BY MOUTH EVERY 7 DAYS. 12 tablet 3     aspirin 81 MG EC tablet Take 1 tablet (81 mg) by mouth daily. Start tomorrow. 30 tablet 3     atorvastatin (LIPITOR) 80 MG tablet Take 1 tablet (80 mg) by mouth daily. 90 tablet 3     Calcium-Magnesium-Zinc (HUGO-MAG-ZINC OR) Take 500 mg by mouth 2 times daily       Cholecalciferol (VITAMIN D3) 50 MCG (2000 UT) CAPS Take 2,000 Units by mouth every other day.       ibuprofen (ADVIL/MOTRIN) 200 MG tablet Take 3 tablets (600 mg) by mouth every 6 hours as needed for pain 50 tablet 0     metoprolol tartrate (LOPRESSOR) 25 MG tablet Take 0.5 tablets (12.5 mg) by mouth 2 times daily. 30 tablet 12     Multiple Vitamins-Minerals (HAIR SKIN AND  NAILS FORMULA) TABS Take 1 tablet by mouth daily.       Multiple Vitamins-Minerals (MULTIVITAMIN PO) Take 1 tablet by mouth daily.       ticagrelor (BRILINTA) 90 MG tablet Take 1 tablet (90 mg) by mouth 2 times daily. Start this evening. 180 tablet 3     valACYclovir (VALTREX) 1000 mg tablet TAKE TWO TABLETS BY MOUTH TWICE A DAY FOR ONE DAY AS NEEDED FOR COLD SORES 20 tablet 1           Henry Swain PA-C  June 17, 2025    This note was partially generated using Dragon voice recognition system, and there may be some incorrect words,  spellings, and punctuation that were not noted in checking the note before saving.    Thank you for allowing me to participate in the care of your patient.      Sincerely,     Henry Swain PA-C     [facility]  cc:   Delilah Begum PA-C  [address] [place] [address]

## 2025-06-18 ENCOUNTER — HOSPITAL ENCOUNTER (OUTPATIENT)
Dept: CARDIAC REHAB | Facility: CLINIC | Age: 75
Discharge: HOME | End: 2025-06-18
Attending: INTERNAL MEDICINE
Payer: COMMERCIAL

## 2025-06-18 PROCEDURE — 93798 PHYS/QHP OP CAR RHAB W/ECG: CPT

## 2025-06-18 PROCEDURE — 93797 PHYS/QHP OP CAR RHAB WO ECG: CPT

## 2025-06-23 ENCOUNTER — HOSPITAL ENCOUNTER (OUTPATIENT)
Dept: CARDIAC REHAB | Facility: CLINIC | Age: 75
Discharge: HOME | End: 2025-06-23
Attending: INTERNAL MEDICINE
Payer: COMMERCIAL

## 2025-06-23 PROCEDURE — 93798 PHYS/QHP OP CAR RHAB W/ECG: CPT

## 2025-06-24 ENCOUNTER — LAB (OUTPATIENT)
Dept: LAB | Facility: CLINIC | Age: 75
End: 2025-06-24
Payer: COMMERCIAL

## 2025-06-24 DIAGNOSIS — E78.00 PURE HYPERCHOLESTEROLEMIA: ICD-10-CM

## 2025-06-24 DIAGNOSIS — I35.0 NONRHEUMATIC AORTIC VALVE STENOSIS: ICD-10-CM

## 2025-06-24 DIAGNOSIS — E78.2 MIXED HYPERLIPIDEMIA: ICD-10-CM

## 2025-06-24 PROCEDURE — 36415 COLL VENOUS BLD VENIPUNCTURE: CPT

## 2025-06-24 PROCEDURE — 80053 COMPREHEN METABOLIC PANEL: CPT

## 2025-06-24 PROCEDURE — 80061 LIPID PANEL: CPT

## 2025-06-25 LAB
ALBUMIN SERPL BCG-MCNC: 4.6 G/DL (ref 3.5–5.2)
ALP SERPL-CCNC: 61 U/L (ref 40–150)
ALT SERPL W P-5'-P-CCNC: 37 U/L (ref 0–50)
ANION GAP SERPL CALCULATED.3IONS-SCNC: 12 MMOL/L (ref 7–15)
AST SERPL W P-5'-P-CCNC: 34 U/L (ref 0–45)
BILIRUB SERPL-MCNC: 0.4 MG/DL
BUN SERPL-MCNC: 14.1 MG/DL (ref 8–23)
CALCIUM SERPL-MCNC: 9.5 MG/DL (ref 8.8–10.4)
CHLORIDE SERPL-SCNC: 107 MMOL/L (ref 98–107)
CHOLEST SERPL-MCNC: 146 MG/DL
CREAT SERPL-MCNC: 0.66 MG/DL (ref 0.51–0.95)
EGFRCR SERPLBLD CKD-EPI 2021: >90 ML/MIN/1.73M2
FASTING STATUS PATIENT QL REPORTED: YES
FASTING STATUS PATIENT QL REPORTED: YES
GLUCOSE SERPL-MCNC: 92 MG/DL (ref 70–99)
HCO3 SERPL-SCNC: 24 MMOL/L (ref 22–29)
HDLC SERPL-MCNC: 71 MG/DL
LDLC SERPL CALC-MCNC: 59 MG/DL
NONHDLC SERPL-MCNC: 75 MG/DL
POTASSIUM SERPL-SCNC: 4.2 MMOL/L (ref 3.4–5.3)
PROT SERPL-MCNC: 7 G/DL (ref 6.4–8.3)
SODIUM SERPL-SCNC: 143 MMOL/L (ref 135–145)
TRIGL SERPL-MCNC: 81 MG/DL

## 2025-06-26 ENCOUNTER — RESULTS FOLLOW-UP (OUTPATIENT)
Dept: CARDIOLOGY | Facility: CLINIC | Age: 75
End: 2025-06-26

## 2025-07-02 ENCOUNTER — HOSPITAL ENCOUNTER (OUTPATIENT)
Dept: CARDIAC REHAB | Facility: CLINIC | Age: 75
Discharge: HOME | End: 2025-07-02
Attending: INTERNAL MEDICINE
Payer: COMMERCIAL

## 2025-07-02 PROCEDURE — 93798 PHYS/QHP OP CAR RHAB W/ECG: CPT

## 2025-07-08 ENCOUNTER — HOSPITAL ENCOUNTER (OUTPATIENT)
Dept: CARDIAC REHAB | Facility: CLINIC | Age: 75
Discharge: HOME | End: 2025-07-08
Attending: INTERNAL MEDICINE
Payer: COMMERCIAL

## 2025-07-08 PROCEDURE — 93798 PHYS/QHP OP CAR RHAB W/ECG: CPT

## 2025-07-17 ENCOUNTER — NURSE TRIAGE (OUTPATIENT)
Dept: INTERNAL MEDICINE | Facility: CLINIC | Age: 75
End: 2025-07-17
Payer: COMMERCIAL

## 2025-07-17 NOTE — TELEPHONE ENCOUNTER
Nurse Triage SBAR    Is this a 2nd Level Triage? NO    Situation: Patient wants appointment.    Background: Symptoms x 2 days. Hx of stent in May 2025, aortic valve stenosis. Patient taking Brilinta, then will be starting clopidogrel 75mg.     Assessment: Patient reports shivering and feeling warm on and off along with other symptoms.  Temp of 101.2 yesterday. Patient reports having pressure in upper stomach and she is burping up brown/yellow bile. Patient had some cough, but nothing currently. Some constipation for 3 days. Had a small bowel movement this am. Still eating okay and drinking fluids.     Patient reports blood pressure of 136/something this morning. Patient's blood pressure during phone call was 166/94 (patient was moving it). Blood pressure was 151/85 after sitting for a few minutes. Patient's pulse was 66. Patient stopped taking her metoprolol about 2 weeks ago because she was feeling good after her stent placement.     No congestion, muscle aches, dizziness. No shortness of breath, chest pain, palpitations. No numbness/tingling or weakness. No speech or vision changes. No cognitive changes like confusion.     Protocol Recommended Disposition:   SEE IN OFFICE OR VIDEO VISIT TODAY:    Recommendation: Appointment scheduled for tomorrow.    Jul 18, 2025 9:30 AM  (Arrive by 9:10 AM)  Provider Visit with LA Tidwell CNP  [facility] ([facility] ) [phone]     Does the patient meet one of the following criteria for ADS visit consideration? 16+ years old, with an [facility] PCP     TIP  Providers, please consider if this condition is appropriate for management at one of our Acute and Diagnostic Services sites.     If patient is a good candidate, please use dotphrase <dot>triageresponse and select Refer to ADS to document.      Reason for Disposition   Patient wants to be seen    Additional Information   Negative: SEVERE weakness (e.g., unable to walk or  barely able to walk, requires support) and new-onset or getting worse   Negative: Difficult to awaken or acting confused (e.g., disoriented, slurred speech)   Negative: Sudden onset of weakness of the face, arm or leg on one side of the body and present now (Exception: Bell's palsy suspected: weakness on one side of the face developing over hours to days, with no other symptoms.)   Negative: Sudden onset of numbness of the face, arm or leg on one side of the body and present now   Negative: Sudden onset of loss of speech or garbled speech and present now   Negative: Sounds like a life-threatening emergency to the triager   Negative: Confusion, disorientation, or hallucinations is main symptom   Negative: Dizziness is main symptom   Negative: Followed a head injury within last 3 days   Negative: Headache (with neurologic deficit)   Negative: Unable to urinate (or only a few drops) and bladder feels very full   Negative: Loss of bladder or bowel control (urine or bowel incontinence; wetting self, leaking stool) of new-onset   Negative: Back pain with numbness (loss of sensation) in groin or rectal area   Negative: Neurologic deficit that was brief (now gone), ANY of the following: * Weakness of the face, arm, or leg on one side of the body * Numbness of the face, arm, or leg on one side of the body * Loss of speech or garbled speech   Negative: Patient sounds very sick or weak to the triager   Negative: Neurologic deficit of gradual onset (e.g., days to weeks), ANY of the following: * Weakness of the face, arm, or leg on one side of the body* Numbness of the face, arm, or leg on one side of the body* Loss of speech or garbled speech   Negative: [place] palsy suspected (i.e., weakness only one side of the face, developing over hours to days, no other symptoms)   Negative: Tingling (e.g., pins and needles) of the face, arm or leg on one side of the body, that is present now  (Exceptions: Chronic or recurrent symptom  "lasting > 4 weeks; or from known cause, such as: bumped elbow, carpal tunnel, pinched nerve.)   Negative: Neck pain (with neurologic deficit)   Negative: Back pain (with neurologic deficit)    Answer Assessment - Initial Assessment Questions  1. SYMPTOM: \"What is the main symptom you are concerned about?\" (e.g., weakness, numbness)      Tremors/shaking.    2. ONSET: \"When did this start?\" (minutes, hours, days; while sleeping)      2 days.    3. LAST NORMAL: \"When was the last time you (the patient) were normal (no symptoms)?\"      2 days ago.    4. PATTERN \"Does this come and go, or has it been constant since it started?\"  \"Is it present now?\"      Comes and goes.     5. CARDIAC SYMPTOMS: \"Have you had any of the following symptoms: chest pain, difficulty breathing, palpitations?\"      No.     6. NEUROLOGIC SYMPTOMS: \"Have you had any of the following symptoms: headache, dizziness, vision loss, double vision, changes in speech, unsteady on your feet?\"      No, just tremors at times.    7. OTHER SYMPTOMS: \"Do you have any other symptoms?\"      Burning in throat, cough.    8. PREGNANCY: \"Is there any chance you are pregnant?\" \"When was your last menstrual period?\"      N/A    Protocols used: Neurologic Deficit-A-OH    "

## 2025-07-18 ENCOUNTER — APPOINTMENT (OUTPATIENT)
Dept: ULTRASOUND IMAGING | Facility: CLINIC | Age: 75
DRG: 418 | End: 2025-07-18
Attending: STUDENT IN AN ORGANIZED HEALTH CARE EDUCATION/TRAINING PROGRAM
Payer: COMMERCIAL

## 2025-07-18 ENCOUNTER — APPOINTMENT (OUTPATIENT)
Dept: MRI IMAGING | Facility: CLINIC | Age: 75
DRG: 418 | End: 2025-07-18
Attending: STUDENT IN AN ORGANIZED HEALTH CARE EDUCATION/TRAINING PROGRAM
Payer: COMMERCIAL

## 2025-07-18 ENCOUNTER — HOSPITAL ENCOUNTER (OUTPATIENT)
Dept: CT IMAGING | Facility: CLINIC | Age: 75
Discharge: HOME | DRG: 418 | End: 2025-07-18
Attending: PREVENTIVE MEDICINE | Admitting: PREVENTIVE MEDICINE
Payer: COMMERCIAL

## 2025-07-18 ENCOUNTER — HOSPITAL ENCOUNTER (INPATIENT)
Facility: CLINIC | Age: 75
LOS: 2 days | Discharge: HOME | DRG: 418 | End: 2025-07-21
Attending: STUDENT IN AN ORGANIZED HEALTH CARE EDUCATION/TRAINING PROGRAM | Admitting: STUDENT IN AN ORGANIZED HEALTH CARE EDUCATION/TRAINING PROGRAM
Payer: COMMERCIAL

## 2025-07-18 DIAGNOSIS — R50.9 FEVER IN ADULT: ICD-10-CM

## 2025-07-18 DIAGNOSIS — E80.6 CONJUGATED HYPERBILIRUBINEMIA: Primary | ICD-10-CM

## 2025-07-18 DIAGNOSIS — R11.0 NAUSEA: ICD-10-CM

## 2025-07-18 DIAGNOSIS — R74.01 ELEVATED TRANSAMINASE LEVEL: ICD-10-CM

## 2025-07-18 DIAGNOSIS — R10.13 EPIGASTRIC PAIN: ICD-10-CM

## 2025-07-18 DIAGNOSIS — K81.9 ACALCULOUS CHOLECYSTITIS: ICD-10-CM

## 2025-07-18 DIAGNOSIS — R07.9 CHEST PAIN, UNSPECIFIED TYPE: ICD-10-CM

## 2025-07-18 DIAGNOSIS — R50.9 FEVER, UNSPECIFIED FEVER CAUSE: ICD-10-CM

## 2025-07-18 PROBLEM — H25.819 COMBINED FORMS OF AGE-RELATED CATARACT: Status: ACTIVE | Noted: 2024-10-08

## 2025-07-18 PROBLEM — H18.519 ENDOTHELIAL CORNEAL DYSTROPHY: Status: ACTIVE | Noted: 2024-10-08

## 2025-07-18 LAB — BILIRUB DIRECT SERPL-MCNC: 1.6 MG/DL (ref 0–0.3)

## 2025-07-18 PROCEDURE — 96365 THER/PROPH/DIAG IV INF INIT: CPT

## 2025-07-18 PROCEDURE — 250N000011 HC RX IP 250 OP 636: Performed by: STUDENT IN AN ORGANIZED HEALTH CARE EDUCATION/TRAINING PROGRAM

## 2025-07-18 PROCEDURE — 74177 CT ABD & PELVIS W/CONTRAST: CPT

## 2025-07-18 PROCEDURE — 96376 TX/PRO/DX INJ SAME DRUG ADON: CPT

## 2025-07-18 PROCEDURE — G0378 HOSPITAL OBSERVATION PER HR: HCPCS

## 2025-07-18 PROCEDURE — 99285 EMERGENCY DEPT VISIT HI MDM: CPT

## 2025-07-18 PROCEDURE — 76705 ECHO EXAM OF ABDOMEN: CPT

## 2025-07-18 PROCEDURE — 74181 MRI ABDOMEN W/O CONTRAST: CPT

## 2025-07-18 PROCEDURE — 82248 BILIRUBIN DIRECT: CPT | Performed by: STUDENT IN AN ORGANIZED HEALTH CARE EDUCATION/TRAINING PROGRAM

## 2025-07-18 PROCEDURE — 36415 COLL VENOUS BLD VENIPUNCTURE: CPT | Performed by: STUDENT IN AN ORGANIZED HEALTH CARE EDUCATION/TRAINING PROGRAM

## 2025-07-18 PROCEDURE — 96361 HYDRATE IV INFUSION ADD-ON: CPT

## 2025-07-18 PROCEDURE — 258N000003 HC RX IP 258 OP 636: Performed by: STUDENT IN AN ORGANIZED HEALTH CARE EDUCATION/TRAINING PROGRAM

## 2025-07-18 PROCEDURE — 250N000013 HC RX MED GY IP 250 OP 250 PS 637: Performed by: STUDENT IN AN ORGANIZED HEALTH CARE EDUCATION/TRAINING PROGRAM

## 2025-07-18 PROCEDURE — 250N000009 HC RX 250: Performed by: PREVENTIVE MEDICINE

## 2025-07-18 PROCEDURE — 250N000011 HC RX IP 250 OP 636: Performed by: PREVENTIVE MEDICINE

## 2025-07-18 PROCEDURE — 99223 1ST HOSP IP/OBS HIGH 75: CPT | Performed by: STUDENT IN AN ORGANIZED HEALTH CARE EDUCATION/TRAINING PROGRAM

## 2025-07-18 PROCEDURE — 99285 EMERGENCY DEPT VISIT HI MDM: CPT | Mod: 25 | Performed by: STUDENT IN AN ORGANIZED HEALTH CARE EDUCATION/TRAINING PROGRAM

## 2025-07-18 RX ORDER — AMOXICILLIN 250 MG
1 CAPSULE ORAL 2 TIMES DAILY PRN
Status: DISCONTINUED | OUTPATIENT
Start: 2025-07-18 | End: 2025-07-21 | Stop reason: HOSPADM

## 2025-07-18 RX ORDER — ONDANSETRON 2 MG/ML
4 INJECTION INTRAMUSCULAR; INTRAVENOUS EVERY 6 HOURS PRN
Status: DISCONTINUED | OUTPATIENT
Start: 2025-07-18 | End: 2025-07-21 | Stop reason: HOSPADM

## 2025-07-18 RX ORDER — SODIUM CHLORIDE, SODIUM LACTATE, POTASSIUM CHLORIDE, CALCIUM CHLORIDE 600; 310; 30; 20 MG/100ML; MG/100ML; MG/100ML; MG/100ML
INJECTION, SOLUTION INTRAVENOUS CONTINUOUS
Status: DISCONTINUED | OUTPATIENT
Start: 2025-07-18 | End: 2025-07-21 | Stop reason: HOSPADM

## 2025-07-18 RX ORDER — ACETAMINOPHEN 325 MG/1
650 TABLET ORAL EVERY 4 HOURS PRN
Status: DISCONTINUED | OUTPATIENT
Start: 2025-07-18 | End: 2025-07-21 | Stop reason: HOSPADM

## 2025-07-18 RX ORDER — TICAGRELOR 90 MG/1
90 TABLET, FILM COATED ORAL 2 TIMES DAILY
Status: ON HOLD | COMMUNITY
End: 2025-07-21

## 2025-07-18 RX ORDER — ONDANSETRON 4 MG/1
4 TABLET, ORALLY DISINTEGRATING ORAL EVERY 6 HOURS PRN
Status: DISCONTINUED | OUTPATIENT
Start: 2025-07-18 | End: 2025-07-21 | Stop reason: HOSPADM

## 2025-07-18 RX ORDER — ACETAMINOPHEN 500 MG
1000 TABLET ORAL EVERY 8 HOURS PRN
COMMUNITY

## 2025-07-18 RX ORDER — MELOXICAM 7.5 MG/1
7.5 TABLET ORAL DAILY PRN
Status: ON HOLD | COMMUNITY
End: 2025-07-21

## 2025-07-18 RX ORDER — ACETAMINOPHEN 650 MG/1
650 SUPPOSITORY RECTAL EVERY 4 HOURS PRN
Status: DISCONTINUED | OUTPATIENT
Start: 2025-07-18 | End: 2025-07-21 | Stop reason: HOSPADM

## 2025-07-18 RX ORDER — IOPAMIDOL 755 MG/ML
75 INJECTION, SOLUTION INTRAVASCULAR ONCE
Status: COMPLETED | OUTPATIENT
Start: 2025-07-18 | End: 2025-07-18

## 2025-07-18 RX ORDER — PIPERACILLIN SODIUM, TAZOBACTAM SODIUM 4; .5 G/20ML; G/20ML
4.5 INJECTION, POWDER, LYOPHILIZED, FOR SOLUTION INTRAVENOUS ONCE
Status: COMPLETED | OUTPATIENT
Start: 2025-07-18 | End: 2025-07-18

## 2025-07-18 RX ORDER — PROCHLORPERAZINE MALEATE 5 MG/1
5 TABLET ORAL EVERY 6 HOURS PRN
Status: DISCONTINUED | OUTPATIENT
Start: 2025-07-18 | End: 2025-07-21 | Stop reason: HOSPADM

## 2025-07-18 RX ORDER — PIPERACILLIN SODIUM, TAZOBACTAM SODIUM 3; .375 G/15ML; G/15ML
3.38 INJECTION, POWDER, LYOPHILIZED, FOR SOLUTION INTRAVENOUS EVERY 6 HOURS
Status: DISCONTINUED | OUTPATIENT
Start: 2025-07-18 | End: 2025-07-21 | Stop reason: HOSPADM

## 2025-07-18 RX ORDER — AMOXICILLIN 250 MG
2 CAPSULE ORAL 2 TIMES DAILY PRN
Status: DISCONTINUED | OUTPATIENT
Start: 2025-07-18 | End: 2025-07-21 | Stop reason: HOSPADM

## 2025-07-18 RX ADMIN — IOPAMIDOL 75 ML: 755 INJECTION, SOLUTION INTRAVENOUS at 11:36

## 2025-07-18 RX ADMIN — PIPERACILLIN AND TAZOBACTAM 4.5 G: 4; .5 INJECTION, POWDER, FOR SOLUTION INTRAVENOUS at 15:41

## 2025-07-18 RX ADMIN — SODIUM CHLORIDE, SODIUM LACTATE, POTASSIUM CHLORIDE, AND CALCIUM CHLORIDE: .6; .31; .03; .02 INJECTION, SOLUTION INTRAVENOUS at 19:03

## 2025-07-18 RX ADMIN — PIPERACILLIN AND TAZOBACTAM 3.38 G: 3; .375 INJECTION, POWDER, FOR SOLUTION INTRAVENOUS at 22:17

## 2025-07-18 RX ADMIN — METOPROLOL TARTRATE 12.5 MG: 25 TABLET, FILM COATED ORAL at 19:46

## 2025-07-18 RX ADMIN — SODIUM CHLORIDE 76 ML: 9 INJECTION, SOLUTION INTRAVENOUS at 11:39

## 2025-07-18 ASSESSMENT — ACTIVITIES OF DAILY LIVING (ADL)
ADLS_ACUITY_SCORE: 41
ADLS_ACUITY_SCORE: 41
ADLS_ACUITY_SCORE: 18
ADLS_ACUITY_SCORE: 18
ADLS_ACUITY_SCORE: 41
ADLS_ACUITY_SCORE: 18
ADLS_ACUITY_SCORE: 41
ADLS_ACUITY_SCORE: 18
ADLS_ACUITY_SCORE: 41

## 2025-07-18 ASSESSMENT — COLUMBIA-SUICIDE SEVERITY RATING SCALE - C-SSRS
1. IN THE PAST MONTH, HAVE YOU WISHED YOU WERE DEAD OR WISHED YOU COULD GO TO SLEEP AND NOT WAKE UP?: NO
2. HAVE YOU ACTUALLY HAD ANY THOUGHTS OF KILLING YOURSELF IN THE PAST MONTH?: NO
6. HAVE YOU EVER DONE ANYTHING, STARTED TO DO ANYTHING, OR PREPARED TO DO ANYTHING TO END YOUR LIFE?: NO

## 2025-07-18 NOTE — H&P
[facility]    History and Physical - Hospitalist Service       Date of Admission:  7/18/2025    Assessment & Plan      Pamela Engle is a 74 year old female admitted on 7/18/2025.     She has history of severe aortic stenosis and is being worked up for TAVR.  Her pre-TAVR coronary angiogram showed single-vessel CAD and underwent RILEY x 1 to RCA.  She was started on aspirin and Brilinta.  Currently she is asymptomatic from aortic stenosis and is being closely followed every 3 months.    She started feeling malaise and flulike symptoms about 4 days ago on Monday and had mild chest discomfort.  On Tuesday evening she had rigors and chills which initially improved with Tylenol but reoccurred the next day and recorded a fever of 101.2  F.  On Thursday she had worsening chest discomfort started having some nausea and regurgitation and went to outpatient clinic for evaluation today.  Chest pain has been nonexertional.    She was referred to acute and diagnostic services.    Workup showed normal CBC and BMP with negative troponin but had AST/ALT of 194/380 with alkaline phosphatase 236 and total/direct bilirubin of 2/1.6.  UA was unremarkable.      Ultrasound abdomen showed distended but otherwise unremarkable gallbladder.  CT abdomen showed a distended gallbladder is may be seen with cholecystitis.      Transaminitis with hyperbilirubinemia and fever:  - Has distended gallbladder on CT and ultrasound.  Likely has cholecystitis.  Given significant transaminitis and hyperbilirubinemia, will check MRCP as he may have choledocholithiasis/cholangitis.  - I have discussed with GI and we have ERCP team over the weekend if she needs ERCP.  Will also consult surgery.  - She is on aspirin and Brilinta but has not taken her dose today.    Chest pain:  Coronary artery disease:  Severe aortic stenosis:  - Clinically does not appear to be cardiac pain and has had negative troponin and EKG. Likely referred from  "hepatobiliary area.  - Given recent RCA is drug-eluting stent on 5/28/2025, patient is on aspirin and Brilinta.  Will need to discuss antiplatelet management with cardiology and procedural/surgical services after MRCP.  - Patient with aortic stenosis as a asymptomatic and was found on routine auscultation.  Given the severity she is being considered for TAVR versus surgical aortic valve replacement and is being closely followed every 3 months.    Hyperlipidemia:  - Hold Lipitor due to transaminitis.    Family communication.  - Son at bedside.            Diet:  N.p.o.  DVT Prophylaxis: Pneumatic Compression Devices  Duran Catheter: Not present  Lines: None     Cardiac Monitoring: None  Code Status:  Full code    Clinically Significant Risk Factors Present on Admission                 # Drug Induced Platelet Defect: home medication list includes an antiplatelet medication             # Obesity: Estimated body mass index is 30.82 kg/m  as calculated from the following:    Height as of an earlier encounter on 7/18/25: 1.486 m (4' 10.5\").    Weight as of an earlier encounter on 7/18/25: 68 kg (150 lb).              Disposition Plan     Medically Ready for Discharge: Anticipated Tomorrow patient placed under observation status but if MRCP shows choledocholithiasis/cholangitis, will need to be changed to inpatient,           Josafat Ray MD  Hospitalist Service  [facility]  Securely message with [employer] (more info)  Text page via [facility] Paging/Directory     ______________________________________________________________________    Chief Complaint   Chest pain    History is obtained from the patient    History of Present Illness   Pamela Engle is a 74 year old female admitted on 7/18/2025.     She has history of severe aortic stenosis and is being worked up for TAVR.  Her pre-TAVR coronary angiogram showed single-vessel CAD and underwent RILEY x 1 to RCA.  She was started on aspirin and Brilinta.  " Currently she is asymptomatic from aortic stenosis and is being closely followed every 3 months.    She started feeling malaise and flulike symptoms about 4 days ago on Monday and had mild chest discomfort.  On Tuesday evening she had rigors and chills which initially improved with Tylenol but reoccurred the next day and recorded a fever of 101.2  F.  On Thursday she had worsening chest discomfort started having some nausea and regurgitation and went to outpatient clinic for evaluation today.  Chest pain has been nonexertional.    She was referred to acute and diagnostic services.    Workup showed normal CBC and BMP with negative troponin but had AST/ALT of 194/380 with alkaline phosphatase 236 and total/direct bilirubin of 2/1.6.  UA was unremarkable.      Ultrasound abdomen showed distended but otherwise unremarkable gallbladder.  CT abdomen showed a distended gallbladder is may be seen with cholecystitis.      Past Medical History    Past Medical History:   Diagnosis Date    Anemia 2012    after surgery    Aortic stenosis     cardiology visit follow up due Fall of 2021    Arthritis     Complication of anesthesia     Gastroesophageal reflux disease     rarely    Hepatitis     hepatitis A as a child    History of anesthesia complications     Tremors after surgery     Primary insomnia 10/5/2020       Past Surgical History   Past Surgical History:   Procedure Laterality Date    ARTHROSCOPY KNEE Left 2003    ARTHROSCOPY KNEE Right 2010    BIOPSY  2001    bone marrow donator    BUNIONECTOMY PORTER Bilateral 1990's    and hammer toe surgery    CV CORONARY ANGIOGRAM N/A 5/28/2025    Procedure: Coronary Angiogram;  Surgeon: Norris Maguire MD;  Location: [facility] LAB CV    CV LEFT HEART CATH N/A 5/28/2025    Procedure: Left Heart Catheterization;  Surgeon: Norris Maguire MD;  Location: [facility] CATH LAB CV    CV PCI STENT DRUG ELUTING N/A 5/28/2025    Procedure: Percutaneous Coronary Intervention Stent;   Surgeon: Norris Maguire MD;  Location: [facility]    ENT SURGERY  1950's    EXCISE MASS TRUNK N/A 11/5/2019    Procedure: EXCISION SUBCUTANEOUS MASS UPPER CHEST MIDLINE;  Surgeon: Karla Vasquez MD;  Location: [facility] OR    EXCISE CASTANO'S NEUROMA FOOT  2007 and  redo 2009    GYN SURGERY  1984    tubal ligation    ORTHOPEDIC SURGERY Right 01/2019    rotator cuff repair    SOFT TISSUE SURGERY  2012    lipoma removed from back       Prior to Admission Medications   Prior to Admission Medications   Prescriptions Last Dose Informant Patient Reported? Taking?   Calcium-Magnesium-Zinc (HUGO-MAG-ZINC OR)   Yes No   Sig: Take 500 mg by mouth 2 times daily   Cholecalciferol (VITAMIN D3) 50 MCG (2000 UT) CAPS   Yes No   Sig: Take 2,000 Units by mouth every other day.   Multiple Vitamins-Minerals (HAIR SKIN AND NAILS FORMULA) TABS   Yes No   Sig: Take 1 tablet by mouth daily.   Multiple Vitamins-Minerals (MULTIVITAMIN PO)   Yes No   Sig: Take 1 tablet by mouth daily.   alendronate (FOSAMAX) 70 MG tablet   No No   Sig: TAKE 1 TABLET (70 MG TOTAL) BY MOUTH EVERY 7 DAYS.   aspirin 81 MG EC tablet   No No   Sig: Take 1 tablet (81 mg) by mouth daily. Start tomorrow.   atorvastatin (LIPITOR) 80 MG tablet   No No   Sig: Take 1 tablet (80 mg) by mouth daily.   clopidogrel (PLAVIX) 75 MG tablet   No No   Sig: Finish Brilinta.Then start, Clopidogrel 600 mg (8 tabs) by mouth once. Next morning, Clopidogrel 75 mg by mouth once daily.   Patient not taking: Reported on 7/18/2025   ibuprofen (ADVIL/MOTRIN) 200 MG tablet   No No   Sig: Take 3 tablets (600 mg) by mouth every 6 hours as needed for pain   metoprolol tartrate (LOPRESSOR) 25 MG tablet   No No   Sig: Take 0.5 tablets (12.5 mg) by mouth 2 times daily.   valACYclovir (VALTREX) 1000 mg tablet   No No   Sig: TAKE TWO TABLETS BY MOUTH TWICE A DAY FOR ONE DAY AS NEEDED FOR COLD SORES      Facility-Administered Medications Last Administration Doses Remaining   sodium  chloride (PF) 0.9% PF flush 3 mL 7/18/2025 11:24 AM            Review of Systems    The 10 point Review of Systems is negative other than noted in the HPI or here.      Physical Exam   Vital Signs: Temp: 99  F (37.2  C)   BP: (!) 157/101 Pulse: 92   Resp: 16 SpO2: 97 % O2 Device: None (Room air)    Weight: 0 lbs 0 oz    General Appearance: Alert awake and oriented x 3  Respiratory: Clear to auscultation  Cardiovascular: S1-S2 normal  GI: Soft and nontender  Skin: No rash  Other: No edema      Medical Decision Making       MANAGEMENT DISCUSSED with the following over the past 24 hours: ER provider, patient, son and GI team       Data     I have personally reviewed the following data over the past 24 hrs:    7.2  \   13.9   / 254     139 102 13.5 /  111 (H)   3.9 23 0.70 \     ALT: 380 (H) AST: 194 (H) AP: 236 (H) TBILI: 2.0 (H)   ALB: 4.6 TOT PROTEIN: 7.4 LIPASE: 25     Trop: 6 BNP: 117     Procal: 0.20 CRP: 26.66 (H) Lactic Acid: 1.0       INR:  0.90 PTT:  N/A   D-dimer:  N/A Fibrinogen:  N/A       Imaging results reviewed over the past 24 hrs:   Recent Results (from the past 24 hours)   CT Chest PE Abdomen Pelvis w Contrast    Narrative    EXAM: CT CHEST PE ABDOMEN PELVIS W CONTRAST  LOCATION: [facility]  DATE: 7/18/2025    INDICATION: chest pain, fever, nausea, epigastric pain  COMPARISON: None.  TECHNIQUE: CT chest pulmonary angiogram and routine CT abdomen pelvis with IV contrast. Arterial phase through the chest and venous phase through the abdomen and pelvis. Multiplanar reformats and MIP reconstructions were performed. Dose reduction   techniques were used.   CONTRAST: 75mL Isovue 370    FINDINGS:  ANGIOGRAM CHEST: Pulmonary arteries are normal caliber and negative for pulmonary emboli. Thoracic aorta is negative for dissection. No CT evidence of right heart strain.     LUNGS AND PLEURA: Central airways are patent. No actionable nodules. No pleural effusion or  pneumothorax.    MEDIASTINUM/AXILLAE: No pathologically enlarged thoracic lymph nodes. Unremarkable thyroid. Normal caliber thoracic aorta. Normal heart size. No pericardial effusion.    CORONARY ARTERY CALCIFICATION: Previous intervention (stents or CABG).     HEPATOBILIARY: Normal liver parenchyma. No biliary dilation. Distended gallbladder.     PANCREAS: Normal.     SPLEEN: Normal.     ADRENAL GLANDS: Normal.     KIDNEYS/BLADDER: Subcentimeter hypoattenuating lesion/lesions are too small to characterize. Nonobstructing bilateral nephrolithiasis. Normal urinary bladder.      BOWEL: No obstruction. Normal appendix. Colonic diverticulosis. No pneumoperitoneum or free fluid.     LYMPH NODES: No pathologically enlarged lymph nodes.    VASCULATURE: Nonaneurysmal aorta with mild aortoiliac calcifications.     PELVIC ORGANS: No pelvic masses.     MUSCULOSKELETAL: Multilevel degenerative disc disease of the thoracolumbar spine. Lumbar fusion hardware.       Impression    IMPRESSION:  1.  No pulmonary embolism to the subsegmental level.  2.  Distended gallbladder, which may be seen with cholecystitis. Recommend further evaluation with right upper quadrant ultrasound.  3.  Additional chronic and ancillary findings as described.     US Abdomen Limited (RUQ)    Narrative    EXAM: US ABDOMEN LIMITED  LOCATION: [facility]  DATE: 7/18/2025    INDICATION: abdominal pain elevated lfts  COMPARISON: CT abdomen pelvis 7/18/2025  TECHNIQUE: Limited abdominal ultrasound.    FINDINGS:    GALLBLADDER: Distended. No gallstones, wall thickening, or pericholecystic fluid. Negative sonographic Gutierrez's sign.    BILE DUCTS: No biliary dilatation. The common duct measures 3 mm.    LIVER: Normal parenchyma with smooth contour. No focal mass. The portal vein is patent with flow in the normal direction.    RIGHT KIDNEY: No hydronephrosis.    PANCREAS: The visualized portions are normal.    No ascites.      Impression     IMPRESSION:  1. Distended but otherwise unremarkable gallbladder.

## 2025-07-18 NOTE — PHARMACY-ADMISSION MEDICATION HISTORY
Pharmacist Admission Medication History    Admission medication history is complete. The information provided in this note is only as accurate as the sources available at the time of the update.    Information Source(s): Patient and CareEverywhere/SureScripts via in-person    Pertinent Information: pt told to finish ticagrelor Rx and start clopidogrel. She also stopped metoprolol because she didn't think she needed it.    Changes made to PTA medication list:  Added: meloxicam, Brilinta, Tylenol  Deleted: ibuprofen, sodium chloride  Changed: None    Allergies reviewed with patient and updates made in EHR: yes    Medication History Completed By: Kermit Severson, RPH 7/18/2025 4:55 PM    PTA Med List   Medication Sig Note Last Dose/Taking    acetaminophen (TYLENOL) 500 MG tablet Take 1,000 mg by mouth every 8 hours as needed for mild pain.  7/17/2025    aspirin 81 MG EC tablet Take 1 tablet (81 mg) by mouth daily. Start tomorrow.  7/18/2025 Morning    atorvastatin (LIPITOR) 80 MG tablet Take 1 tablet (80 mg) by mouth daily.  7/17/2025 Evening    Calcium-Magnesium-Zinc (HUGO-MAG-ZINC OR) Take 500 mg by mouth 2 times daily  7/17/2025 Evening    meloxicam (MOBIC) 7.5 MG tablet Take 7.5 mg by mouth daily as needed for moderate pain.  Past Week    metoprolol tartrate (LOPRESSOR) 25 MG tablet Take 0.5 tablets (12.5 mg) by mouth 2 times daily. 7/18/2025: Pt stopped on her own volition  Past Month    Multiple Vitamins-Minerals (HAIR SKIN AND NAILS FORMULA) TABS Take 1 tablet by mouth daily.  7/17/2025 Morning    Multiple Vitamins-Minerals (MULTIVITAMIN PO) Take 1 tablet by mouth daily.  7/17/2025 Morning    ticagrelor (BRILINTA) 90 MG tablet Take 90 mg by mouth 2 times daily. 7/18/2025: Stop and start clopidogrel when Rx completed 7/17/2025 Evening

## 2025-07-18 NOTE — ED NOTES
[facility]  ED Nurse Handoff Report    ED Chief complaint: Abdominal Pain  . ED Diagnosis:   Final diagnoses:   Elevated transaminase level       Allergies:   Allergies   Allergen Reactions    Hydrocodone Swelling       Code Status: Full Code    Activity level - Baseline/Home:  independent.  Activity Level - Current:   independent.   Lift room needed: No.   Bariatric: No   Needed: No   Isolation: No.   Infection: Not Applicable.     Respiratory status: Room air    Vital Signs (within 30 minutes):   Vitals:    07/18/25 1243 07/18/25 1244   BP:  (!) 157/101   Pulse: 92    Resp: 16    Temp: 99  F (37.2  C)    SpO2: 97%        Cardiac Rhythm:  ,      Pain level:    Patient confused: No.   Patient Falls Risk: nonskid shoes/slippers when out of bed.   Elimination Status: Has voided     Patient Report - Initial Complaint: Abd pain.   Focused Assessment: Pamela Engle is a very pleasant 74 year old female with anemia presenting with abdominal pain. The patient reports that she was seen by a diagnostician this morning and had abnormal labs. The patient was seen by the diagnostician this morning due to abdominal pain that has coincided with intermittent chest pressure, fever, chills, rigors, and nausea. The patient notes that her pain gets worse after eating, but is relatively constant. She also endorses darker urine and constipation. She reports she only drinks alcohol once or twice a month and has had an aortic valve stenosis procedure scheduled, but has not been able to get it done yet because she received a stent a month and a half ago. The patient denies vomiting.      Abnormal Results:   Labs Ordered and Resulted from Time of ED Arrival to Time of ED Departure   BILIRUBIN DIRECT - Abnormal       Result Value    Bilirubin Direct 1.60 (*)         US Abdomen Limited (RUQ)   Final Result   IMPRESSION:   1. Distended but otherwise unremarkable gallbladder.                Treatments  provided: See Epic  Family Comments: NA  OBS brochure/video discussed/provided to patient:  Yes  ED Medications:   Medications   piperacillin-tazobactam (ZOSYN) 4.5 g vial to attach to  mL bag (has no administration in time range)       Drips infusing:  No  For the majority of the shift this patient was Green.   Interventions performed were NA.    Sepsis treatment initiated: No    ED Nurse Name: Sarah Joy RN  2:55 PM    RECEIVING UNIT ED HANDOFF REVIEW    Above ED Nurse Handoff Report was reviewed: Yes  Reviewed by: Rabia Arreola RN on July 18, 2025 at 4:49 PM   OLIVIA Mccann called the ED to inform them the note was read: Yes

## 2025-07-18 NOTE — PLAN OF CARE
PRIMARY DIAGNOSIS: BILIARY COLIC/UNCOMPLICATED EARLY ACUTE CHOLECYSTITIS  OUTPATIENT/OBSERVATION GOALS TO BE MET BEFORE DISCHARGE:    1. Pain status: Pain free.  2. Stable vital signs and labs (if performed) at disposition: No  3. Tolerating adequate PO diet: No  4. Successful cholecystectomy or clear follow up plan with General Surgery team if immediate surgery not performed No  5. ADLs back to baseline?  Yes  6. Activity and level of assistance: Ambulating independently.  7. Barriers to discharge noted Yes Needs gen surg consult and MRCP      Discharge Planner Nurse   Safe discharge environment identified: Yes  Barriers to discharge: Yes       Entered by: Rabia Arreola RN 07/18/2025 6:31 PM     Please review provider order for any additional goals.   Nurse to notify provider when observation goals have been met and patient is ready for discharge.    Discharge Planner Nurse   Safe discharge environment identified: Yes  Barriers to discharge: No       Entered by: Rabia Arreola RN 07/18/2025 6:30 PM     Please review provider order for any additional goals.   Nurse to notify provider when observation goals have been met and patient is ready for discharge.    Goal Outcome Evaluation:      Plan of Care Reviewed With: patient, child, family    Overall Patient Progress: improvingOverall Patient Progress: improving            Problem: Adult Inpatient Plan of Care  Goal: Plan of Care Review  Description: The Plan of Care Review/Shift note should be completed every shift.  The Outcome Evaluation is a brief statement about your assessment that the patient is improving, declining, or no change.  This information will be displayed automatically on your shift  note.  Outcome: Progressing  Flowsheets (Taken 7/18/2025 0076)  Outcome Evaluation: AO1, denies pain,  I V abx  Plan of Care Reviewed With:   patient   child   family  Overall Patient Progress: improving  Goal: Patient-Specific Goal (Individualized)  Description:  "You can add care plan individualizations to a care plan. Examples of Individualization might be:  \"Parent requests to be called daily at 9am for status\", \"I have a hard time hearing out of my right ear\", or \"Do not touch me to wake me up as it startles  me\".  Outcome: Progressing  Goal: Absence of Hospital-Acquired Illness or Injury  Outcome: Progressing  Goal: Optimal Comfort and Wellbeing  Outcome: Progressing  Goal: Readiness for Transition of Care  Outcome: Progressing  Intervention: Mutually Develop Transition Plan  Recent Flowsheet Documentation  Taken 7/18/2025 1729 by Rabia Arreola RN  Equipment Currently Used at Home: none     Problem: Delirium  Goal: Optimal Coping  Outcome: Progressing  Goal: Improved Behavioral Control  Outcome: Progressing  Goal: Improved Attention and Thought Clarity  Outcome: Progressing  Goal: Improved Sleep  Outcome: Progressing     "

## 2025-07-18 NOTE — ED TRIAGE NOTES
Upper abdominal pain for the past 5 days. CT completed that shows possible cholecystitis. Sent to ER for abdominal US.

## 2025-07-18 NOTE — ED PROVIDER NOTES
Emergency Department Note      History of Present Illness     Chief Complaint   Abdominal Pain      HPI   Pamela Engle is a very pleasant 74 year old female with aortic stenosis, coronary artery disease, status post PCI and stenting of RCA on 5/28/25, presenting with abdominal pain. The patient reports that she was seen by a diagnostician this morning and had abnormal labs. The patient was seen by the diagnostician this morning due to abdominal pain that has coincided with intermittent chest pressure, fever, chills, rigors, and nausea. The patient notes that her pain gets worse after eating, but is relatively constant. She also endorses darker urine and constipation. She reports she only drinks alcohol once or twice a month and has had an aortic valve stenosis procedure scheduled, but has not been able to get it done yet because she received a stent a month and a half ago. The patient denies vomiting.     Independent Historian   None    Review of External Notes   I personally reviewed notes from the patient's clinic visit dated today. This provided me with information regarding patient's recent clinical course.     Past Medical History     Medical History and Problem List   Anemia   Aortic stenosis   Arthritis   Complication of anesthesia   Gastroesophageal reflux disease   Hepatitis   History of anesthesia complications   Primary insomnia    Medications   Fosamax  Aspirin 82mg  Atorvastatin  Plavix  Metoprolol  Valtrex  Crestor  Fosamax  Valtrex    Surgical History   Knee arthroscopy  Bone marrow biopsy  Bunionectomy   Left heart catheterization  PCI stent drug Eluting  ENT surgery  Excision of subcutaneous mass   Excision of santamaria's neuroma foot  Tubal ligation  Rotator cuff repair   Lipoma removed from back    Physical Exam     Patient Vitals for the past 24 hrs:   BP Temp Temp src Pulse Resp SpO2 Height Weight   07/18/25 1924 128/70 100  F (37.8  C) Oral 83 18 95 % -- --   07/18/25 1726 (!) 148/80 100.9  F  "(38.3  C) Oral 85 18 97 % 1.486 m (4' 10.5\") 68.9 kg (151 lb 12.8 oz)   07/18/25 1537 -- -- -- -- -- 98 % -- --   07/18/25 1536 (!) 165/81 -- -- 78 -- -- -- --   07/18/25 1244 (!) 157/101 -- -- -- -- -- -- --   07/18/25 1243 -- 99  F (37.2  C) -- 92 16 97 % -- --     Physical Exam  Gen: Alert, female appearing stated age, seated comfortably in recliner  HEENT: Moist mucous membranes. No scleral icterus.  CV: Regular rate and rhythm.  Abd: Soft, non-distended abdomen. No tenderness to palpation. No guarding. No rebound. No masses. No hepatosplenomegaly. Negative Gutierrez's sign.   : No CVA tenderness bilaterally  Skin: Warm and dry. No jaundice.  Neuro: Alert and oriented. No focal deficits.     Diagnostics     Lab Results   Labs Ordered and Resulted from Time of ED Arrival to Time of ED Departure   BILIRUBIN DIRECT - Abnormal       Result Value    Bilirubin Direct 1.60 (*)        Imaging   US Abdomen Limited (RUQ)   Final Result   IMPRESSION:   1. Distended but otherwise unremarkable gallbladder.            MR Abdomen MRCP without Contrast    (Results Pending)       EKG   No ECG performed.     Independent Interpretation   None    ED Course      Medications Administered   Medications   metoprolol tartrate (LOPRESSOR) half-tab 12.5 mg (12.5 mg Oral $Given 7/18/25 1946)   piperacillin-tazobactam (ZOSYN) 3.375 g vial to attach to  mL bag (has no administration in time range)   senna-docusate (SENOKOT-S/PERICOLACE) 8.6-50 MG per tablet 1 tablet (has no administration in time range)     Or   senna-docusate (SENOKOT-S/PERICOLACE) 8.6-50 MG per tablet 2 tablet (has no administration in time range)   ondansetron (ZOFRAN ODT) ODT tab 4 mg (has no administration in time range)     Or   ondansetron (ZOFRAN) injection 4 mg (has no administration in time range)   prochlorperazine (COMPAZINE) injection 5 mg (has no administration in time range)     Or   prochlorperazine (COMPAZINE) tablet 5 mg (has no administration in time " range)   lactated ringers infusion ( Intravenous $New Bag 7/18/25 1903)   acetaminophen (TYLENOL) tablet 650 mg (has no administration in time range)     Or   acetaminophen (TYLENOL) Suppository 650 mg (has no administration in time range)   melatonin tablet 1 mg (has no administration in time range)   piperacillin-tazobactam (ZOSYN) 4.5 g vial to attach to  mL bag (0 g Intravenous Stopped 7/18/25 1619)       Procedures   Procedures   None performed    Discussion of Management   I consulted with Dr. Ray of the hospitalist service and discussed patient admission. They accepted care of the patient.       ED Course   ED Course as of 07/18/25 2027 Fri Jul 18, 2025   1255 I obtained history and examined the patient as noted above.    1450 I consulted with Dr. Ray of the hospitalist service and discussed patient admission. They accepted care of the patient.          Additional Documentation  None    Medical Decision Making / Diagnosis     CMS Diagnoses: The patient has signs of sepsis   Sepsis ED evaluation   The patient has signs of sepsis as evidenced by:  1. Presence of 2 SIRS criteria, suspected infection, AND  2. Organ dysfunction: Sepsis work up in progress. Will continue to monitor for signs of organ dysfunction    Sepsis Care Initiation: Starting at 1300 PM on 07/18/25, until specified. Prior to this documentation, sepsis, severe sepsis, or septic shock was NOT thought to be a significant cause of illness. This order represents the first time infection was seriously considered to be affecting the patient.    Lactic Acid Results:  Recent Labs   Lab Test 07/18/25  1121   LACT 1.0       3 Hour Bundle 6 Hour Bundle (Reassessment)   Blood Cultures before IV Antibiotics: Yes  Antibiotics given: see below  Prehospital fluid volume (mL):                     Total fluids given (ED +Pre-hospital):  The patient responded to a lesser volume of IV fluids. The initial volume ordered was 0 mL.    Repeat Lactic Acid  Level: Ordered by reflex for 2 hours after initial lactic acid collection.  Vasopressors: MAP>65 after initial IVF bolus, will continue to monitor fluid status and vital signs.  Repeat perfusion exam: I attest to having performed a repeat sepsis exam and assessment of perfusion at 1600 PM.   BMI Readings from Last 1 Encounters:   07/18/25 31.19 kg/m        Anti-infectives (From admission through now)      Start     Dose/Rate Route Frequency Ordered Stop    07/18/25 1500  piperacillin-tazobactam (ZOSYN) 4.5 g vial to attach to  mL bag         4.5 g  over 30 Minutes Intravenous ONCE 07/18/25 1432 07/18/25 1619                MIPS   None               MDM   Pamela Engle is a 74 year old female presenting with approximately 4 days of epigastric pain, worse with eating, nausea, fever and what sounds like rigors.  On arrival here, vital signs are notable for elevated blood pressure but otherwise reassuring.  Extensive workup was already performed prior to her arrival in the emergency department.  Labs are notable for elevated AST/ALT with ALT predominance, elevated total bilirubin, elevated CRP.  CT imaging suggested gallbladder inflammation.  At this point, concern for differential including cholecystitis, choledocholithiasis, cholangitis, though seems less likely given how the patient is relatively well-appearing at this time and has no leukocytosis.  Will obtain direct bilirubin and ultrasound of right upper quadrant to complete workup.  Conjugated bilirubin consistent with conjugated hyperbilirubinemia.  Ultrasound shows no evidence of cholecystitis but does show a distended gallbladder.  This could reflect acalculous cholecystitis.  CBD is within normal range.  I have concern for biliary outflow obstruction possibly secondary to stenosis or choledocholithiasis.  Developing cholangitis is also a concern, though patient is relatively well appearing at this time.  Started the patient on Zosyn.  Blood cultures  were obtained earlier today.  She will be admitted to hospitalist for further evaluation and management.       Disposition   The patient was admitted to the hospital.     Diagnosis     ICD-10-CM    1. Conjugated hyperbilirubinemia  E80.6       2. Elevated transaminase level  R74.01       3. Epigastric pain  R10.13       4. Fever in adult  R50.9            Discharge Medications   Current Discharge Medication List            Scribe Disclosure:  I, Parminder Smith, am serving as a scribe at 1:18 PM on 7/18/2025 to document services personally performed by Enrique Hillman MD based on my observations and the provider's statements to me.        Enrique Hillman MD  07/18/25 2027

## 2025-07-19 PROBLEM — R50.9 FEVER IN ADULT: Status: ACTIVE | Noted: 2025-07-19

## 2025-07-19 PROBLEM — E80.6 CONJUGATED HYPERBILIRUBINEMIA: Status: ACTIVE | Noted: 2025-07-19

## 2025-07-19 PROBLEM — R10.13 EPIGASTRIC PAIN: Status: ACTIVE | Noted: 2025-07-19

## 2025-07-19 LAB
ALBUMIN SERPL BCG-MCNC: 3.8 G/DL (ref 3.5–5.2)
ALP SERPL-CCNC: 210 U/L (ref 40–150)
ALT SERPL W P-5'-P-CCNC: 368 U/L (ref 0–50)
ANION GAP SERPL CALCULATED.3IONS-SCNC: 13 MMOL/L (ref 7–15)
AST SERPL W P-5'-P-CCNC: 222 U/L (ref 0–45)
BILIRUB DIRECT SERPL-MCNC: 2.07 MG/DL (ref 0–0.3)
BILIRUB SERPL-MCNC: 3 MG/DL
BUN SERPL-MCNC: 11.9 MG/DL (ref 8–23)
CALCIUM SERPL-MCNC: 8.9 MG/DL (ref 8.8–10.4)
CHLORIDE SERPL-SCNC: 104 MMOL/L (ref 98–107)
CREAT SERPL-MCNC: 0.62 MG/DL (ref 0.51–0.95)
EGFRCR SERPLBLD CKD-EPI 2021: >90 ML/MIN/1.73M2
ERYTHROCYTE [DISTWIDTH] IN BLOOD BY AUTOMATED COUNT: 14.6 % (ref 10–15)
GLUCOSE SERPL-MCNC: 85 MG/DL (ref 70–99)
HCO3 SERPL-SCNC: 20 MMOL/L (ref 22–29)
HCT VFR BLD AUTO: 35.4 % (ref 35–47)
HGB BLD-MCNC: 11.6 G/DL (ref 11.7–15.7)
MCH RBC QN AUTO: 28.6 PG (ref 26.5–33)
MCHC RBC AUTO-ENTMCNC: 32.8 G/DL (ref 31.5–36.5)
MCV RBC AUTO: 87 FL (ref 78–100)
PLATELET # BLD AUTO: 212 10E3/UL (ref 150–450)
POTASSIUM SERPL-SCNC: 3.5 MMOL/L (ref 3.4–5.3)
PROT SERPL-MCNC: 6.3 G/DL (ref 6.4–8.3)
RBC # BLD AUTO: 4.05 10E6/UL (ref 3.8–5.2)
SODIUM SERPL-SCNC: 137 MMOL/L (ref 135–145)
WBC # BLD AUTO: 4.3 10E3/UL (ref 4–11)

## 2025-07-19 PROCEDURE — 96361 HYDRATE IV INFUSION ADD-ON: CPT

## 2025-07-19 PROCEDURE — G0378 HOSPITAL OBSERVATION PER HR: HCPCS

## 2025-07-19 PROCEDURE — 47563 LAPARO CHOLECYSTECTOMY/GRAPH: CPT | Mod: AS | Performed by: PHYSICIAN ASSISTANT

## 2025-07-19 PROCEDURE — 96376 TX/PRO/DX INJ SAME DRUG ADON: CPT

## 2025-07-19 PROCEDURE — 84155 ASSAY OF PROTEIN SERUM: CPT | Performed by: STUDENT IN AN ORGANIZED HEALTH CARE EDUCATION/TRAINING PROGRAM

## 2025-07-19 PROCEDURE — 99233 SBSQ HOSP IP/OBS HIGH 50: CPT | Performed by: STUDENT IN AN ORGANIZED HEALTH CARE EDUCATION/TRAINING PROGRAM

## 2025-07-19 PROCEDURE — 99222 1ST HOSP IP/OBS MODERATE 55: CPT | Performed by: INTERNAL MEDICINE

## 2025-07-19 PROCEDURE — 80048 BASIC METABOLIC PNL TOTAL CA: CPT | Performed by: STUDENT IN AN ORGANIZED HEALTH CARE EDUCATION/TRAINING PROGRAM

## 2025-07-19 PROCEDURE — 120N000001 HC R&B MED SURG/OB

## 2025-07-19 PROCEDURE — S2900 ROBOTIC SURGICAL SYSTEM: HCPCS | Performed by: STUDENT IN AN ORGANIZED HEALTH CARE EDUCATION/TRAINING PROGRAM

## 2025-07-19 PROCEDURE — 85018 HEMOGLOBIN: CPT | Performed by: STUDENT IN AN ORGANIZED HEALTH CARE EDUCATION/TRAINING PROGRAM

## 2025-07-19 PROCEDURE — 258N000003 HC RX IP 258 OP 636: Performed by: STUDENT IN AN ORGANIZED HEALTH CARE EDUCATION/TRAINING PROGRAM

## 2025-07-19 PROCEDURE — 250N000011 HC RX IP 250 OP 636: Performed by: STUDENT IN AN ORGANIZED HEALTH CARE EDUCATION/TRAINING PROGRAM

## 2025-07-19 PROCEDURE — 36415 COLL VENOUS BLD VENIPUNCTURE: CPT | Performed by: STUDENT IN AN ORGANIZED HEALTH CARE EDUCATION/TRAINING PROGRAM

## 2025-07-19 PROCEDURE — 250N000013 HC RX MED GY IP 250 OP 250 PS 637: Performed by: STUDENT IN AN ORGANIZED HEALTH CARE EDUCATION/TRAINING PROGRAM

## 2025-07-19 PROCEDURE — 99222 1ST HOSP IP/OBS MODERATE 55: CPT | Mod: 57 | Performed by: STUDENT IN AN ORGANIZED HEALTH CARE EDUCATION/TRAINING PROGRAM

## 2025-07-19 RX ORDER — ASPIRIN 81 MG/1
81 TABLET ORAL DAILY
Status: DISCONTINUED | OUTPATIENT
Start: 2025-07-19 | End: 2025-07-19

## 2025-07-19 RX ORDER — ASPIRIN 81 MG/1
81 TABLET ORAL DAILY
Status: DISCONTINUED | OUTPATIENT
Start: 2025-07-20 | End: 2025-07-19

## 2025-07-19 RX ORDER — ASPIRIN 81 MG/1
81 TABLET ORAL DAILY
Status: DISCONTINUED | OUTPATIENT
Start: 2025-07-19 | End: 2025-07-21 | Stop reason: HOSPADM

## 2025-07-19 RX ADMIN — PIPERACILLIN AND TAZOBACTAM 3.38 G: 3; .375 INJECTION, POWDER, FOR SOLUTION INTRAVENOUS at 17:31

## 2025-07-19 RX ADMIN — PIPERACILLIN AND TAZOBACTAM 3.38 G: 3; .375 INJECTION, POWDER, FOR SOLUTION INTRAVENOUS at 09:50

## 2025-07-19 RX ADMIN — PIPERACILLIN AND TAZOBACTAM 3.38 G: 3; .375 INJECTION, POWDER, FOR SOLUTION INTRAVENOUS at 04:33

## 2025-07-19 RX ADMIN — SODIUM CHLORIDE, SODIUM LACTATE, POTASSIUM CHLORIDE, AND CALCIUM CHLORIDE: .6; .31; .03; .02 INJECTION, SOLUTION INTRAVENOUS at 19:35

## 2025-07-19 RX ADMIN — ASPIRIN 81 MG: 81 TABLET, DELAYED RELEASE ORAL at 17:26

## 2025-07-19 RX ADMIN — SENNOSIDES AND DOCUSATE SODIUM 1 TABLET: 50; 8.6 TABLET ORAL at 17:42

## 2025-07-19 RX ADMIN — SENNOSIDES AND DOCUSATE SODIUM 1 TABLET: 50; 8.6 TABLET ORAL at 09:20

## 2025-07-19 RX ADMIN — METOPROLOL TARTRATE 12.5 MG: 25 TABLET, FILM COATED ORAL at 07:42

## 2025-07-19 RX ADMIN — PIPERACILLIN AND TAZOBACTAM 3.38 G: 3; .375 INJECTION, POWDER, FOR SOLUTION INTRAVENOUS at 22:30

## 2025-07-19 RX ADMIN — METOPROLOL TARTRATE 12.5 MG: 25 TABLET, FILM COATED ORAL at 19:43

## 2025-07-19 ASSESSMENT — ACTIVITIES OF DAILY LIVING (ADL)
ADLS_ACUITY_SCORE: 24
ADLS_ACUITY_SCORE: 22
ADLS_ACUITY_SCORE: 18
ADLS_ACUITY_SCORE: 18
ADLS_ACUITY_SCORE: 25
ADLS_ACUITY_SCORE: 22
ADLS_ACUITY_SCORE: 18
ADLS_ACUITY_SCORE: 22
ADLS_ACUITY_SCORE: 18
ADLS_ACUITY_SCORE: 22
ADLS_ACUITY_SCORE: 22
ADLS_ACUITY_SCORE: 25
ADLS_ACUITY_SCORE: 25
ADLS_ACUITY_SCORE: 18
ADLS_ACUITY_SCORE: 22
ADLS_ACUITY_SCORE: 18
ADLS_ACUITY_SCORE: 22

## 2025-07-19 NOTE — CONSULTS
General Surgery Consultation    Pamela Engle MRN# [id number]   Age: 74 year old YOB: 1950     Date of Admission:  7/18/2025    Reason for consult:            Abdominal pain       Requesting physician:            Flor                Assessment and Plan:   Assessment:   Pamela Engle is a 74 year old female with history of aortic stenosis with plans for TAVR in August and recent drug eluting stent placement in May on ticagrelor and aspirin (held for 48hrs) who presented with nausea, vomiting and dark urine. MRCP, U/S and CT shows dilated gallbladder without thickening, stones, or CBD dilation. Differential includes choledocholithiasis with a passed stone (although unlikely with no gallstones present and bili ist still rising) vs severe acalculous cholecystitis (although no RUQ pain and somewhat unusual to have elevated bili) vs some hepatic process. Additionally patient is not a good surgical candidate from a cardiac standpoint and from bleeding risk. Ideally we would obtain a HIDA scan to better assess for cholecystitis but this is not available until tomorrow. I will discuss case with cardiology, GI and primary team to come up with the best option. If we agree she has cholecystitis we will need to decide if she will tolerate general anesthesia and insufflation of the abdomen, if not she will require a cholecystostomy tube which is not performed at this hospital on the weekends.     Comorbidities:   has a past medical history of Anemia (2012), Aortic stenosis, Arthritis, Complication of anesthesia, Gastroesophageal reflux disease, Hepatitis, History of anesthesia complications, and Primary insomnia (10/5/2020).      Plan:   Keep patient NPO for now and continue IV abx until a treatment plan is established.           Chief Complaint:   Abdominal pain     History is obtained from the patient.         History of Present Illness:   Pamela Engle is a 74 year old female who presents with very mild  intermittent epigastric region abdominal pain for the past 4 days that feels like mild indigestion and she has had nausea and vomiting. She additionally had a fever at home. Today she feels almost normal without nausea or pain but has noticed worsening darkening of her urine and eyes.           Past Medical History:    has a past medical history of Anemia (2012), Aortic stenosis, Arthritis, Complication of anesthesia, Gastroesophageal reflux disease, Hepatitis, History of anesthesia complications, and Primary insomnia (10/5/2020).          Past Surgical History:     Past Surgical History:   Procedure Laterality Date    ARTHROSCOPY KNEE Left 2003    ARTHROSCOPY KNEE Right 2010    BIOPSY  2001    bone marrow donator    BUNIONECTOMY PORTER Bilateral 1990's    and hammer toe surgery    CV CORONARY ANGIOGRAM N/A 5/28/2025    Procedure: Coronary Angiogram;  Surgeon: Norris Maguire MD;  Location: [facility] CATH LAB CV    CV LEFT HEART CATH N/A 5/28/2025    Procedure: Left Heart Catheterization;  Surgeon: Norris Maguire MD;  [facility] CV    CV PCI STENT DRUG ELUTING N/A 5/28/2025    Procedure: Percutaneous Coronary Intervention Stent;  Surgeon: Norris Maguire MD;  Location: [facility] CATH LAB CV    ENT SURGERY  1950's    EXCISE MASS TRUNK N/A 11/5/2019    Procedure: EXCISION SUBCUTANEOUS MASS UPPER CHEST MIDLINE;  Surgeon: Karla Vasquez MD;  Location: [facility] OR    EXCISE CASTANO'S NEUROMA FOOT  2007 and  redo 2009    GYN SURGERY  1984    tubal ligation    ORTHOPEDIC SURGERY Right 01/2019    rotator cuff repair    SOFT TISSUE SURGERY  2012    lipoma removed from back             Social History:     Social History     Tobacco Use    Smoking status: Former     Current packs/day: 0.00     Average packs/day: 1 pack/day for 41.1 years (41.1 ttl pk-yrs)     Types: Cigarettes     Start date: 1/1/1969     Quit date: 2/1/2010     Years since quitting: 15.4    Smokeless tobacco: Never   Substance Use  Topics    Alcohol use: Yes     Alcohol/week: 2.0 standard drinks of alcohol     Comment: 1-2 drinks per week             Family History:   Family history reviewed and is not pertinent.         Allergies:     Allergies   Allergen Reactions    Hydrocodone Swelling             Medications:     Current Facility-Administered Medications   Medication Dose Route Frequency Provider Last Rate Last Admin    acetaminophen (TYLENOL) tablet 650 mg  650 mg Oral Q4H PRN Josafat Ray MD        Or    acetaminophen (TYLENOL) Suppository 650 mg  650 mg Rectal Q4H PRN Josafat Ray MD        lactated ringers infusion   Intravenous Continuous Josafat Ray  mL/hr at 07/18/25 1903 New Bag at 07/18/25 1903    melatonin tablet 1 mg  1 mg Oral At Bedtime PRN Josafat Ray MD        metoprolol tartrate (LOPRESSOR) half-tab 12.5 mg  12.5 mg Oral BID Josafat Ray MD   12.5 mg at 07/19/25 0742    ondansetron (ZOFRAN ODT) ODT tab 4 mg  4 mg Oral Q6H PRN Josafat Ray MD        Or    ondansetron (ZOFRAN) injection 4 mg  4 mg Intravenous Q6H PRN Josafat Ray MD        piperacillin-tazobactam (ZOSYN) 3.375 g vial to attach to  mL bag  3.375 g Intravenous Q6H Josafat Ray MD   3.375 g at 07/19/25 0950    prochlorperazine (COMPAZINE) injection 5 mg  5 mg Intravenous Q6H PRN Josafat Ray MD        Or    prochlorperazine (COMPAZINE) tablet 5 mg  5 mg Oral Q6H PRJosafat Clement MD        senna-docusate (SENOKOT-S/PERICOLACE) 8.6-50 MG per tablet 1 tablet  1 tablet Oral BID PRN Josafat Ray MD   1 tablet at 07/19/25 0920    Or    senna-docusate (SENOKOT-S/PERICOLACE) 8.6-50 MG per tablet 2 tablet  2 tablet Oral BID PRN Josafat Ray MD         Current Facility-Administered Medications   Medication Dose Route Frequency Provider Last Rate Last Admin    metoprolol tartrate (LOPRESSOR) half-tab 12.5 mg  12.5 mg Oral BID Josafat Ray MD   12.5 mg at 07/19/25 0742    piperacillin-tazobactam (ZOSYN) 3.375 g vial to attach to  mL bag   "3.375 g Intravenous Q6H Josafat Ray MD   3.375 g at 07/19/25 0950            Review of Systems:   The 10 point review of systems is negative other than noted in the HPI.          Physical Exam:   /68 (BP Location: Left arm)   Pulse 71   Temp 98.9  F (37.2  C) (Oral)   Resp 18   Ht 1.486 m (4' 10.5\")   Wt 68.9 kg (151 lb 12.8 oz)   SpO2 96%   BMI 31.19 kg/m    General - Well developed, well nourished female in no apparent distress  HEENT:  Head normocephalic and atraumatic, pupils equal and round, conjunctivae clear, scleral icterus, mucous membranes moist, external ears and nose normal  Lungs: breathing comfortably on room air  Abdomen:   soft, flat, non-distended with severe and no tenderness noted. no masses palpated  Extremities: Warm without edema  Neurologic: nonfocal  Psychiatric: Mood and affect appropriate  Skin: mild jaundice         Data:     WBC -   Lab Results   Component Value Date    WBC 4.3 07/19/2025       HgB -   Lab Results   Component Value Date    HGB 11.6 (L) 07/19/2025       Plt-   Lab Results   Component Value Date     07/19/2025       Liver Function Studies -   Recent Labs   Lab Test 07/19/25  0441   PROTTOTAL 6.3*   ALBUMIN 3.8   BILITOTAL 3.0*   ALKPHOS 210*   *   *       Lipase-   Lab Results   Component Value Date    LIPASE 25 07/18/2025         Imaging:  All imaging studies reviewed by me.    Results for orders placed or performed during the hospital encounter of 07/18/25   US Abdomen Limited (RUQ)    Narrative    EXAM: US ABDOMEN LIMITED  LOCATION: [facility]  DATE: 7/18/2025    INDICATION: abdominal pain elevated lfts  COMPARISON: CT abdomen pelvis 7/18/2025  TECHNIQUE: Limited abdominal ultrasound.    FINDINGS:    GALLBLADDER: Distended. No gallstones, wall thickening, or pericholecystic fluid. Negative sonographic Gutierrez's sign.    BILE DUCTS: No biliary dilatation. The common duct measures 3 mm.    LIVER: Normal parenchyma " with smooth contour. No focal mass. The portal vein is patent with flow in the normal direction.    RIGHT KIDNEY: No hydronephrosis.    PANCREAS: The visualized portions are normal.    No ascites.      Impression    IMPRESSION:  1. Distended but otherwise unremarkable gallbladder.       MR Abdomen MRCP without Contrast    Narrative    EXAM: MR ABDOMEN MRCP W/O CONTRAST  LOCATION: [facility]  DATE: 7/18/2025    INDICATION: Epigastric pain.  Elevated LFTs.  COMPARISON: 7/18/2025  TECHNIQUE: Routine MR liver/pancreas protocol including axial and coronal MRCP sequences. 2D and 3D reconstruction performed by MR technologist including MIP reconstruction and slab cholangiograms. If performed with contrast, additional dynamic T1 post   IV contrast images.  CONTRAST: None     FINDINGS:     MRCP: Normal intra and extrahepatic bile ducts. No dilatation, stricture, or intra ductal stones. No masses. Normal pancreatic duct with normal duct anatomy. Distended but otherwise unremarkable gallbladder.    LIVER: No significant hepatic abnormality. No focal masses. No evidence of cirrhosis or significant fat or iron deposition.    PANCREAS: No evidence of mass or pancreatitis.    ADDITIONAL FINDINGS: No significant abnormality in the spleen, kidneys, and adrenal glands. No adenopathy. No ascites.      Impression    IMPRESSION:  1.  Distended gallbladder. Otherwise unremarkable MRCP.       This note was created using voice recognition software. Undetected word substitutions or other errors may have occurred.     Time spent with the patient, reviewing the EMR, reviewing laboratory and imaging studies, more than 50% of which was counseling and coordinating care:  30 minutes.     Sharmaine Swift MD

## 2025-07-19 NOTE — CONSULTS
[facility]    CARDIOLOGY CONSULT    Date of Admission:  7/18/2025  Date of Consult: July 19, 2025    ASSESSMENT:  74-year-old female seen for preop evaluation for cholecystitis.  She has no cardiac symptoms.  Her aortic stenosis is in the early severe range, she has preserved ejection fraction and no heart failure symptoms.  She underwent stenting 2 months ago electively of the RCA.  Patient would be relatively lower risk for cholecystectomy if needed.   With her aortic stenosis being in the early severe range, she would likely tolerate anesthesia fine.  The risk of stent thrombosis being off antiplatelet therapy is in the 2% range.  She has already been off DAPT for 2 days.  Would prefer to restart aspirin as soon as possible, could stay off Brilinta a little longer if needed.  The risk of this will need to be balanced against the risk of bleeding based on the urgency of surgery.    RECOMMENDATIONS:  1.  Preop evaluation in context of CAD and aortic stenosis  - Patient is a lower cardiac risk for possible cholecystectomy, avoid excessive fluids and hypotension   - 2% risk of stent thrombosis staying off antiplatelets, preferably minimize duration of holding aspirin and restarted soon as possible, could hold Brilinta a little longer if needed    Cardiology will follow peripherally, call if needed.    Todd Vasquez MD  Cardiology - [facility]  Pager:  [phone]  Text Page  July 19, 2025    CODE STATUS:  Full Code    REASON FOR CONSULT: Aortic stenosis, CAD, preop    PRIMARY CARE PHYSICIAN:  Maxine Bonner    HISTORY OF PRESENT ILLNESS:  74 year old female with aortic stenosis and CAD is seen for preop cardiac evaluation.    Echo May 2025 showed EF 60%, normal RV, moderate to severe aortic stenosis with mean 27 mmHg, area 0.9 cm , DI 0.26.  May 2025 she underwent stenting of the proximal to mid RCA with a 4.0 x 48 mm Synergy drug-eluting stent, she had mild disease elsewhere.  She has been  on aspirin and Brilinta.    She now presents with abdominal symptoms and imaging has shown dilated gallbladder concerning for cholecystitis.  She has had a few days of some fevers chills, and upper epigastric discomfort.  This morning she feels well with no GI related symptoms.  Last dose of aspirin and Brilinta was 2 days ago.    PAST MEDICAL HISTORY:  I have reviewed this patient's medical history and updated it with pertinent information if needed.   Past Medical History:   Diagnosis Date    Anemia 2012    after surgery    Aortic stenosis     cardiology visit follow up due Fall of 2021    Arthritis     Complication of anesthesia     Gastroesophageal reflux disease     rarely    Hepatitis     hepatitis A as a child    History of anesthesia complications     Tremors after surgery     Primary insomnia 10/5/2020       PAST SURGICAL HISTORY:  I have reviewed this patient's surgical history and updated it with pertinent information if needed.  Past Surgical History:   Procedure Laterality Date    ARTHROSCOPY KNEE Left 2003    ARTHROSCOPY KNEE Right 2010    BIOPSY  2001    bone marrow donator    BUNIONECTOMY PORTER Bilateral 1990's    and hammer toe surgery    CV CORONARY ANGIOGRAM N/A 5/28/2025    Procedure: Coronary Angiogram;  Surgeon: Norris Maguire MD;  Location: [facility]    CV LEFT HEART CATH N/A 5/28/2025    Procedure: Left Heart Catheterization;  Surgeon: Norris Maguire MD;  Location: [facility]    CV PCI STENT DRUG ELUTING N/A 5/28/2025    Procedure: Percutaneous Coronary Intervention Stent;  Surgeon: Norris Maguire MD;  Location: [facility] CATH LAB CV    ENT SURGERY  1950's    EXCISE MASS TRUNK N/A 11/5/2019    Procedure: EXCISION SUBCUTANEOUS MASS UPPER CHEST MIDLINE;  Surgeon: Karla Vasquez MD;  Location: [facility] OR    EXCISE CASTANO'S NEUROMA FOOT  2007 and  redo 2009    GYN SURGERY  1984    tubal ligation    ORTHOPEDIC SURGERY Right 01/2019    rotator cuff repair    SOFT  TISSUE SURGERY  2012    lipoma removed from back       HOME MEDICATIONS:  Prior to Admission Medications   Prescriptions Last Dose Informant Patient Reported? Taking?   Calcium-Magnesium-Zinc (HUGO-MAG-ZINC OR) 7/17/2025 Evening  Yes Yes   Sig: Take 500 mg by mouth 2 times daily   Cholecalciferol (VITAMIN D3) 50 MCG (2000 UT) CAPS 7/16/2025 Evening  Yes No   Sig: Take 2,000 Units by mouth every other day.   Multiple Vitamins-Minerals (HAIR SKIN AND NAILS FORMULA) TABS 7/17/2025 Morning  Yes Yes   Sig: Take 1 tablet by mouth daily.   Multiple Vitamins-Minerals (MULTIVITAMIN PO) 7/17/2025 Morning  Yes Yes   Sig: Take 1 tablet by mouth daily.   acetaminophen (TYLENOL) 500 MG tablet 7/17/2025  Yes Yes   Sig: Take 1,000 mg by mouth every 8 hours as needed for mild pain.   alendronate (FOSAMAX) 70 MG tablet 7/14/2025 Morning  No No   Sig: TAKE 1 TABLET (70 MG TOTAL) BY MOUTH EVERY 7 DAYS.   aspirin 81 MG EC tablet 7/18/2025 Morning  No Yes   Sig: Take 1 tablet (81 mg) by mouth daily. Start tomorrow.   atorvastatin (LIPITOR) 80 MG tablet 7/17/2025 Evening  No Yes   Sig: Take 1 tablet (80 mg) by mouth daily.   clopidogrel (PLAVIX) 75 MG tablet   No No   Sig: Finish Brilinta.Then start, Clopidogrel 600 mg (8 tabs) by mouth once. Next morning, Clopidogrel 75 mg by mouth once daily.   meloxicam (MOBIC) 7.5 MG tablet Past Week  Yes Yes   Sig: Take 7.5 mg by mouth daily as needed for moderate pain.   metoprolol tartrate (LOPRESSOR) 25 MG tablet Past Month  No Yes   Sig: Take 0.5 tablets (12.5 mg) by mouth 2 times daily.   ticagrelor (BRILINTA) 90 MG tablet 7/17/2025 Evening  Yes Yes   Sig: Take 90 mg by mouth 2 times daily.   valACYclovir (VALTREX) 1000 mg tablet Unknown  No No   Sig: TAKE TWO TABLETS BY MOUTH TWICE A DAY FOR ONE DAY AS NEEDED FOR COLD SORES      Facility-Administered Medications: None       ALLERGIES:  Allergies   Allergen Reactions    Hydrocodone Swelling       SOCIAL HISTORY:  I have reviewed this patient's  social history and updated it with pertinent information if needed. Pamela Engle  reports that she quit smoking about 15 years ago. Her smoking use included cigarettes. She started smoking about 56 years ago. She has a 41.1 pack-year smoking history. She has never used smokeless tobacco. She reports current alcohol use of about 2.0 standard drinks of alcohol per week. She reports that she does not use drugs.    FAMILY HISTORY:  I have reviewed this patient's family history and updated it with pertinent information if needed.   Family History   Problem Relation Age of Onset    CABG Mother 77    Arthritis Mother     Kidney Disease Mother     Pacemaker Mother     Colon Cancer Father     Hypertension Father     Hyperlipidemia Father     Prostate Cancer Father     Dementia Father     CABG Father     CABG Sister 58    Diabetes Sister     Coronary Stenting Sister 63        2 stents    Substance Abuse Brother     Myocardial Infarction Brother     CABG Brother     Liver Cancer Maternal Grandmother     Breast Cancer Paternal Grandmother     Lung Cancer Paternal Grandfather        REVIEW OF SYSTEMS:  Constitutional:  No weight loss, fever, chills  HEENT:  Eyes:  No visual loss, blurred vision, double vision or yellow sclerae. No hearing loss, sneezing, congestion, runny nose or sore throat.  Skin:  No rash or itching.  Cardiovascular: per HPI  Respiratory: per HPI  GI:  No anorexia, nausea, vomiting or diarrhea. No abdominal pain or blood.  :  No dysurea, hematuria  Neurologic:  No headache, paralysis, ataxia, numbness or tingling in the extremities. No change in bowel or bladder control.  Musculoskeletal:  No muscle pain  Hematologic:  No bleeding or bruising.  Lymphatics:  No enlarged nodes. No history of splenectomy.  Endocrine:  No reports of sweating, cold or heat intolerance. No polyuria or polydipsia.  Allergies:  No history of asthma, hives, eczema or rhinitis.    PHYSICAL EXAM:  Temp: 98.1  F (36.7  C) Temp src:  "Oral BP: 131/68 Pulse: 68   Resp: 18 SpO2: 96 % O2 Device: None (Room air)    Vital Signs with Ranges  Temp:  [98.1  F (36.7  C)-100.9  F (38.3  C)] 98.1  F (36.7  C)  Pulse:  [68-92] 68  Resp:  [16-18] 18  BP: (118-165)/() 131/68  SpO2:  [95 %-98 %] 96 %  151 lbs 12.8 oz    Constitutional: awake, alert, no distress  Eyes: PERRL, sclera nonicteric  ENT: trachea midline  Respiratory: Lungs clear   Cardiovascular: regular rate and rhythm, 2/6 mid peaking systolic murmur at the upper sternal border  GI: nondistended, nontender, bowel sounds present  Lymph/Hematologic: no lymphadenopathy  Skin: dry, no rash  Musculoskeletal: good muscle tone, strength 5/5 in upper and lower extremities  Neurologic: no focal deficits  Neuropsychiatric: appropriate affact    No intake or output data in the 24 hours ending 07/19/25 1227    Clinically Significant Risk Factors Present on Admission                 # Drug Induced Platelet Defect: home medication list includes an antiplatelet medication             # Obesity: Estimated body mass index is 31.19 kg/m  as calculated from the following:    Height as of this encounter: 1.486 m (4' 10.5\").    Weight as of this encounter: 68.9 kg (151 lb 12.8 oz).           Native vessel CAD  Non-Rheumatic Valve Disease: Aortic valve stenosis    Not present on admission    Not present on admission    Not present on admission    Not present on admission    Not present on admission    Not present on admission      DATA:  Labs: Potassium 3.5, creatinine 0.6, alk phos 210, , , bilirubin 3.0, troponin negative, NT proBNP 117, hemoglobin 11, platelets 212  Recent Labs   Lab Test 06/24/25  1116 09/12/24  1127   CHOL 146 202*   HDL 71 68   LDL 59 111*   TRIG 81 113       EKG: July 18: Sinus rhythm, rate 84, normal EKG      "

## 2025-07-19 NOTE — CONSULTS
Gastroenterology Consultation    Patient: Pamela Engle   1950  Date of Consult:  7/19/2025  Admission Date/Time: 7/18/2025 12:45 PM  Primary Care Provider:  Maxine Bonner was asked to see this patient in consultation by Josafat Ray MD for evaluation of elevated liver function chemistries.    HPI:  Pamela Engle is a 74 year old female admitted on 7/18/2025 with chest pressure.     She has history of severe aortic stenosis and is being worked up for TAVR.  Her pre-TAVR coronary angiogram showed single-vessel CAD and underwent RILEY x 1 to RCA.  She was started on aspirin and Brilinta.  Currently she is asymptomatic from aortic stenosis and is being closely followed every 3 months.     She started feeling malaise and flulike symptoms about 4 days ago on Monday and had mild chest discomfort.  On Tuesday evening she had rigors and chills which initially improved with Tylenol but reoccurred the next day and recorded a fever of 101.2  F.  On Thursday she had worsening chest discomfort started having some nausea and regurgitation and went to outpatient clinic for evaluation today.  Chest pain has been nonexertional.     She was referred to acute and diagnostic services.     Workup showed normal CBC and BMP with negative troponin but had AST/ALT of 194/380 with alkaline phosphatase 236 and total/direct bilirubin of 2/1.6.  UA was unremarkable.       Ultrasound abdomen showed distended but otherwise unremarkable gallbladder.  CT abdomen showed a distended gallbladder is may be seen with cholecystitis.  MRCP was negative for choledocholithiasis, gallbladder wall is thickened.    Today she told me all of her symptoms have resolved and she is feeling well.    REVIEW OF SYSTEMS:  A comprehensive ten point review of systems was performed and was negative aside from those in mentioned in the HPI.      PAST MEDICAL HISTORY:  Past Medical History:   Diagnosis Date    Anemia 2012    after surgery    Aortic stenosis      cardiology visit follow up due Fall of 2021    Arthritis     Complication of anesthesia     Gastroesophageal reflux disease     rarely    Hepatitis     hepatitis A as a child    History of anesthesia complications     Tremors after surgery     Primary insomnia 10/5/2020       PAST SURGICAL HISTORY:  Past Surgical History:   Procedure Laterality Date    ARTHROSCOPY KNEE Left 2003    ARTHROSCOPY KNEE Right 2010    BIOPSY  2001    bone marrow donator    BUNIONECTOMY PORTER Bilateral 1990's    and hammer toe surgery    CV CORONARY ANGIOGRAM N/A 5/28/2025    Procedure: Coronary Angiogram;  Surgeon: Norris Maguire MD;  Location: [facility] LAB CV    CV LEFT HEART CATH N/A 5/28/2025    Procedure: Left Heart Catheterization;  Surgeon: Norris Maguire MD;  Location: [facility] CV    CV PCI STENT DRUG ELUTING N/A 5/28/2025    Procedure: Percutaneous Coronary Intervention Stent;  Surgeon: Norris Maguire MD;  Location: [facility] CATH LAB CV    ENT SURGERY  1950's    EXCISE MASS TRUNK N/A 11/5/2019    Procedure: EXCISION SUBCUTANEOUS MASS UPPER CHEST MIDLINE;  Surgeon: Karla Vasquez MD;  Location:  OR    EXCISE CASTANO'S NEUROMA FOOT  2007 and  redo 2009    GYN SURGERY  1984    tubal ligation    ORTHOPEDIC SURGERY Right 01/2019    rotator cuff repair    SOFT TISSUE SURGERY  2012    lipoma removed from back       MEDICATIONS:   :   Prior to Admission Medications   Prescriptions Last Dose Informant Patient Reported? Taking?   Calcium-Magnesium-Zinc (HUGO-MAG-ZINC OR) 7/17/2025 Evening  Yes Yes   Sig: Take 500 mg by mouth 2 times daily   Cholecalciferol (VITAMIN D3) 50 MCG (2000 UT) CAPS 7/16/2025 Evening  Yes No   Sig: Take 2,000 Units by mouth every other day.   Multiple Vitamins-Minerals (HAIR SKIN AND NAILS FORMULA) TABS 7/17/2025 Morning  Yes Yes   Sig: Take 1 tablet by mouth daily.   Multiple Vitamins-Minerals (MULTIVITAMIN PO) 7/17/2025 Morning  Yes Yes   Sig: Take 1 tablet by mouth daily.    acetaminophen (TYLENOL) 500 MG tablet 7/17/2025  Yes Yes   Sig: Take 1,000 mg by mouth every 8 hours as needed for mild pain.   alendronate (FOSAMAX) 70 MG tablet 7/14/2025 Morning  No No   Sig: TAKE 1 TABLET (70 MG TOTAL) BY MOUTH EVERY 7 DAYS.   aspirin 81 MG EC tablet 7/18/2025 Morning  No Yes   Sig: Take 1 tablet (81 mg) by mouth daily. Start tomorrow.   atorvastatin (LIPITOR) 80 MG tablet 7/17/2025 Evening  No Yes   Sig: Take 1 tablet (80 mg) by mouth daily.   clopidogrel (PLAVIX) 75 MG tablet   No No   Sig: Finish Brilinta.Then start, Clopidogrel 600 mg (8 tabs) by mouth once. Next morning, Clopidogrel 75 mg by mouth once daily.   meloxicam (MOBIC) 7.5 MG tablet Past Week  Yes Yes   Sig: Take 7.5 mg by mouth daily as needed for moderate pain.   metoprolol tartrate (LOPRESSOR) 25 MG tablet Past Month  No Yes   Sig: Take 0.5 tablets (12.5 mg) by mouth 2 times daily.   ticagrelor (BRILINTA) 90 MG tablet 7/17/2025 Evening  Yes Yes   Sig: Take 90 mg by mouth 2 times daily.   valACYclovir (VALTREX) 1000 mg tablet Unknown  No No   Sig: TAKE TWO TABLETS BY MOUTH TWICE A DAY FOR ONE DAY AS NEEDED FOR COLD SORES      Facility-Administered Medications: None       ALLERGIES:   :   Allergies   Allergen Reactions    Hydrocodone Swelling       SOCIAL HISTORY:  Social History     Tobacco Use    Smoking status: Former     Current packs/day: 0.00     Average packs/day: 1 pack/day for 41.1 years (41.1 ttl pk-yrs)     Types: Cigarettes     Start date: 1/1/1969     Quit date: 2/1/2010     Years since quitting: 15.4    Smokeless tobacco: Never   Vaping Use    Vaping status: Never Used   Substance Use Topics    Alcohol use: Yes     Alcohol/week: 2.0 standard drinks of alcohol     Comment: 1-2 drinks per week    Drug use: No       FAMILY HISTORY:  No first degree relative with colon cancer, gastric cancer, crohn's disease, ulcerative colitis, or liver disease.     EXAM:  General Appearance: Alert, oriented x3, no acute  "distress.  /68 (BP Location: Left arm)   Pulse 71   Temp 98.9  F (37.2  C) (Oral)   Resp 18   Ht 1.486 m (4' 10.5\")   Wt 68.9 kg (151 lb 12.8 oz)   SpO2 96%   BMI 31.19 kg/m    Eye: sclera anicteric.  ENT: oropharynx clear, no thrush.  CV: RRR.  Resp: CTA bilaterally.  GI: Soft, NABS, NT/ND, no masses.  Musculoskeletal: no joint swelling, or erythema, no pedal edema.  Neuro: alert and non-focal.     Labs and imaging reviewed    Recent Labs   Lab Test 07/19/25  0441 07/18/25  1121 05/22/25  1435   WBC 4.3 7.2 7.6   HGB 11.6* 13.9 12.9   MCV 87 89 89    254 322   INR  --  0.90  --      Recent Labs   Lab Test 07/19/25  0441 07/18/25  1121 06/24/25  1116   POTASSIUM 3.5 3.9 4.2   CHLORIDE 104 102 107   CO2 20* 23 24   BUN 11.9 13.5 14.1   ANIONGAP 13 14 12     Recent Labs   Lab Test 07/19/25  0441 07/18/25  1121 06/24/25  1116   ALBUMIN 3.8 4.6 4.6   BILITOTAL 3.0* 2.0* 0.4   * 380* 37   * 194* 34   PROTEIN  --  20*  --    LIPASE  --  25  --        ASSESSMENT AND PLAN:    This patient is a 74-year-old female who presented with chest pressure.  She has a history of known cardiac disease however at this time an acute change in her cardiac status has been excluded.  Her LFTs are elevated consistent with a possible obstructive pattern.  MRCP did not reveal any choledocholithiasis.  The gallbladder wall was thickened indicating that the patient may have cholecystitis.  Clinically she has improved today.  Endoscopic ultrasound and/or ERCP are not currently indicated.    1.  Repeat LFTs tomorrow.  2.  Surgical consult for possible cholecystectomy.  This may be complicated given the fact that she has recently undergone cardiac intervention.  3.  Okay to resume oral diet from GI point of view as no procedures are planned for today.    45 minutes of total time was spent providing patient care, including patient evaluation, reviewing documentation/test results, and .          Jarad VALERIO" MD [facility]  Thank you for the opportunity to participate in the care of this patient.   Please feel free to call with any questions or concerns.  [phone].       CC: [facility], Maxine Bonner

## 2025-07-19 NOTE — PROGRESS NOTES
[facility]    Medicine Progress Note - Hospitalist Service    Date of Admission:  7/18/2025    Assessment & Plan   Pamela Engle is a 74 year old female admitted on 7/18/2025.      She has history of severe aortic stenosis and is being worked up for TAVR.  Her pre-TAVR coronary angiogram showed single-vessel CAD and underwent RILEY x 1 to RCA.  She was started on aspirin and Brilinta.  Currently she is asymptomatic from aortic stenosis and is being closely followed every 3 months.     She started feeling malaise and flulike symptoms about 4 days ago on Monday and had mild chest discomfort.  On Tuesday evening she had rigors and chills which initially improved with Tylenol but reoccurred the next day and recorded a fever of 101.2  F.  On Thursday she had worsening chest discomfort started having some nausea and regurgitation and went to outpatient clinic for evaluation today.  Chest pain has been nonexertional.     She was referred to acute and diagnostic services.     Workup showed normal CBC and BMP with negative troponin but had AST/ALT of 194/380 with alkaline phosphatase 236 and total/direct bilirubin of 2/1.6.  UA was unremarkable.       Ultrasound abdomen showed distended but otherwise unremarkable gallbladder.  CT abdomen showed a distended gallbladder is may be seen with cholecystitis.        Transaminitis with hyperbilirubinemia and fever:  - Has distended gallbladder on CT and ultrasound.  MRCP also showed distended gallbladder but no choledocholithiasis cholangitis.  Interestingly LFTs including bilirubin are higher today.  - Discussed with surgery, planning cholecystectomy tomorrow.  - Okay to take aspirin today but hold Brilinta till after surgery..     Chest pain:  Coronary artery disease:  Severe aortic stenosis:  - Clinically does not appear to be cardiac pain and has had negative troponin and EKG. Likely referred from hepatobiliary area.  - Given recent RCA is drug-eluting  "stent on 5/28/2025, patient was on aspirin and Brilinta.  Discussed with cardiology, resume aspirin and okay to hold Brilinta till definitive surgical treatment is done for cholecystitis.  - Patient with aortic stenosis as a asymptomatic and was found on routine auscultation.  Given the severity she is being considered for TAVR versus surgical aortic valve replacement and is being closely followed every 3 months.     Hyperlipidemia:  - Hold Lipitor due to transaminitis.          Diet: NPO for Medical/Clinical Reasons Except for: Ice Chips    DVT Prophylaxis: Pneumatic Compression Devices  Duran Catheter: Not present  Lines: None     Cardiac Monitoring: None  Code Status: Full Code      Clinically Significant Risk Factors Present on Admission                 # Drug Induced Platelet Defect: home medication list includes an antiplatelet medication             # Obesity: Estimated body mass index is 31.19 kg/m  as calculated from the following:    Height as of this encounter: 1.486 m (4' 10.5\").    Weight as of this encounter: 68.9 kg (151 lb 12.8 oz).              Social Drivers of Health    Tobacco Use: Medium Risk (7/18/2025)    Patient History     Smoking Tobacco Use: Former     Smokeless Tobacco Use: Never   Physical Activity: Insufficiently Active (10/2/2024)    Received from [facility]    Exercise Vital Sign     Days of Exercise per Week: 2 days     Minutes of Exercise per Session: 20 min   Social Connections: Unknown (9/7/2024)    Social Connection and Isolation Panel [NHANES]     Frequency of Social Gatherings with Friends and Family: Twice a week          Disposition Plan     Medically Ready for Discharge: Anticipated in 2-4 Days             Josafat Ray MD  Hospitalist Service  [facility]  Securely message with [employer] (more info)  Text page via [employer] Paging/Directory   ______________________________________________________________________    Interval History   Denies pain.  No " fever.    Physical Exam   Vital Signs: Temp: 98.1  F (36.7  C) Temp src: Oral BP: 131/68 Pulse: 68   Resp: 18 SpO2: 96 % O2 Device: None (Room air)    Weight: 151 lbs 12.8 oz    General Appearance: Alert awake and oriented x 3  Respiratory: Clear to auscultation  Cardiovascular: S1-S2 normal  GI: Soft and nontender  Skin: No rash  Other: No edema      Medical Decision Making       MANAGEMENT DISCUSSED with the following over the past 24 hours: Surgical team, GI, patient and RN       Data     I have personally reviewed the following data over the past 24 hrs:    4.3  \   11.6 (L)   / 212     137 104 11.9 /  85   3.5 20 (L) 0.62 \     ALT: 368 (H) AST: 222 (H) AP: 210 (H) TBILI: 3.0 (H)   ALB: 3.8 TOT PROTEIN: 6.3 (L) LIPASE: N/A       Imaging results reviewed over the past 24 hrs:   Recent Results (from the past 24 hours)   MR Abdomen MRCP without Contrast    Narrative    EXAM: MR ABDOMEN MRCP W/O CONTRAST  LOCATION: [facility]  DATE: 7/18/2025    INDICATION: Epigastric pain.  Elevated LFTs.  COMPARISON: 7/18/2025  TECHNIQUE: Routine MR liver/pancreas protocol including axial and coronal MRCP sequences. 2D and 3D reconstruction performed by MR technologist including MIP reconstruction and slab cholangiograms. If performed with contrast, additional dynamic T1 post   IV contrast images.  CONTRAST: None     FINDINGS:     MRCP: Normal intra and extrahepatic bile ducts. No dilatation, stricture, or intra ductal stones. No masses. Normal pancreatic duct with normal duct anatomy. Distended but otherwise unremarkable gallbladder.    LIVER: No significant hepatic abnormality. No focal masses. No evidence of cirrhosis or significant fat or iron deposition.    PANCREAS: No evidence of mass or pancreatitis.    ADDITIONAL FINDINGS: No significant abnormality in the spleen, kidneys, and adrenal glands. No adenopathy. No ascites.      Impression    IMPRESSION:  1.  Distended gallbladder. Otherwise  unremarkable MRCP.

## 2025-07-19 NOTE — PLAN OF CARE
Goal Outcome Evaluation:      Plan of Care Reviewed With: patient, child, family    Overall Patient Progress: improvingOverall Patient Progress: improving     PRIMARY DIAGNOSIS: BILIARY COLIC/UNCOMPLICATED EARLY ACUTE CHOLECYSTITIS  OUTPATIENT/OBSERVATION GOALS TO BE MET BEFORE DISCHARGE:    1. Pain status: Pain free.  2. Stable vital signs and labs (if performed) at disposition: No  3. Tolerating adequate PO diet: NPO  4. Successful cholecystectomy or clear follow up plan with General Surgery team if immediate surgery not performed Yes  5. ADLs back to baseline?  Yes  6. Activity and level of assistance: Ambulating independently.  7. Barriers to discharge noted Yes, Gen Surg consult    Discharge Planner Nurse   Safe discharge environment identified: Yes  Barriers to discharge: Yes       Entered by: Sharon Wtaers RN 07/19/2025 12:50 AM   Vitals are Temp: 98.9  F (37.2  C) Temp src: Oral BP: 118/56 Pulse: 78   Resp: 18 SpO2: 95 %.  Patient is Alert and Oriented x4. denying pain.  Patient has Lactated Ringer's running at 100 mL per hour.    Pt is a NPO and Ice chips diet. She is independent with no assistive devices . Vss on room air, BC are pending, MRCP preformed this evening, Gen surg consulted, son at bedside.  Please review provider order for any additional goals.   Nurse to notify provider when observation goals have been met and patient is ready for discharge.

## 2025-07-19 NOTE — PLAN OF CARE
Goal Outcome Evaluation:      Plan of Care Reviewed With: patient, child, family    Overall Patient Progress: improvingOverall Patient Progress: improving     PRIMARY DIAGNOSIS: BILIARY COLIC/UNCOMPLICATED EARLY ACUTE CHOLECYSTITIS  OUTPATIENT/OBSERVATION GOALS TO BE MET BEFORE DISCHARGE:    1. Pain status: Pain free.  2. Stable vital signs and labs (if performed) at disposition: No  3. Tolerating adequate PO diet: NPO  4. Successful cholecystectomy or clear follow up plan with General Surgery team if immediate surgery not performed Yes  5. ADLs back to baseline?  Yes  6. Activity and level of assistance: Ambulating independently.  7. Barriers to discharge noted Yes, Gen Surg consult    Discharge Planner Nurse   Safe discharge environment identified: Yes  Barriers to discharge: Yes       Entered by: Sharon Waters RN 07/19/2025 4:02 AM   Vitals are Temp: 98.9  F (37.2  C) Temp src: Oral BP: 118/56 Pulse: 78   Resp: 18 SpO2: 95 %.  Patient is Alert and Oriented x4. denying pain.  Patient has Lactated Ringer's running at 100 mL per hour.    Pt is a NPO and Ice chips diet. She is independent with no assistive devices . Vss on room air, BC are pending, MRCP preformed this evening, Gen surg consulted, son at bedside.  Please review provider order for any additional goals.   Nurse to notify provider when observation goals have been met and patient is ready for discharge.

## 2025-07-19 NOTE — PLAN OF CARE
"/68 (BP Location: Left arm)   Pulse 68   Temp 98.1  F (36.7  C) (Oral)   Resp 18   Ht 1.486 m (4' 10.5\")   Wt 68.9 kg (151 lb 12.8 oz)   SpO2 96%   BMI 31.19 kg/m      Neuro: a/o x4  Cardiac: WNL  Lungs: WNL  GI: WNL  : WNL  Pain: denies  IV: LR@100  Meds: zosyn  Diet: reg. NPO @midnight  Activity: independent   Misc: surg tomorrow.gastroenterology, cardiology,gen surg following.  Plan: Surg tomorrow. Currently resting in bed, able to make need known.           Problem: Adult Inpatient Plan of Care  Goal: Plan of Care Review  Description: The Plan of Care Review/Shift note should be completed every shift.  The Outcome Evaluation is a brief statement about your assessment that the patient is improving, declining, or no change.  This information will be displayed automatically on your shift  note.  Outcome: Progressing  Flowsheets (Taken 7/19/2025 6063)  Outcome Evaluation: surgery tomorrow  Plan of Care Reviewed With: patient  Overall Patient Progress: improving  Goal: Patient-Specific Goal (Individualized)  Description: You can add care plan individualizations to a care plan. Examples of Individualization might be:  \"Parent requests to be called daily at 9am for status\", \"I have a hard time hearing out of my right ear\", or \"Do not touch me to wake me up as it startles  me\".  Outcome: Progressing  Goal: Absence of Hospital-Acquired Illness or Injury  Outcome: Progressing  Intervention: Identify and Manage Fall Risk  Recent Flowsheet Documentation  Taken 7/19/2025 0742 by Kasandra Gusman RN  Safety Promotion/Fall Prevention:   assistive device/personal items within reach   clutter free environment maintained   nonskid shoes/slippers when out of bed   safety round/check completed  Intervention: Prevent Skin Injury  Recent Flowsheet Documentation  Taken 7/19/2025 0742 by Kasandra Gusman, RN  Body Position: position changed independently  Intervention: Prevent and Manage VTE (Venous Thromboembolism) " Risk  Recent Flowsheet Documentation  Taken 7/19/2025 0742 by Kasandra Gusman RN  VTE Prevention/Management: SCDs off (sequential compression devices)  Intervention: Prevent Infection  Recent Flowsheet Documentation  Taken 7/19/2025 0742 by Kasandra Gusman RN  Infection Prevention:   single patient room provided   rest/sleep promoted   cohorting utilized  Goal: Optimal Comfort and Wellbeing  Outcome: Progressing  Goal: Readiness for Transition of Care  Outcome: Progressing     Problem: Delirium  Goal: Optimal Coping  Outcome: Progressing  Goal: Improved Behavioral Control  Outcome: Progressing  Intervention: Minimize Safety Risk  Recent Flowsheet Documentation  Taken 7/19/2025 0742 by Kasandra Gusman RN  Enhanced Safety Measures: review medications for side effects with activity  Goal: Improved Attention and Thought Clarity  Outcome: Progressing  Goal: Improved Sleep  Outcome: Progressing         Goal Outcome Evaluation:      Plan of Care Reviewed With: patient    Overall Patient Progress: improvingOverall Patient Progress: improving    Outcome Evaluation: surgery tomorrow

## 2025-07-19 NOTE — PLAN OF CARE
Goal Outcome Evaluation:      Plan of Care Reviewed With: patient, child, family    Overall Patient Progress: improvingOverall Patient Progress: improving     PRIMARY DIAGNOSIS: BILIARY COLIC/UNCOMPLICATED EARLY ACUTE CHOLECYSTITIS  OUTPATIENT/OBSERVATION GOALS TO BE MET BEFORE DISCHARGE:    1. Pain status: Pain free.  2. Stable vital signs and labs (if performed) at disposition: No  3. Tolerating adequate PO diet: NPO  4. Successful cholecystectomy or clear follow up plan with General Surgery team if immediate surgery not performed Yes  5. ADLs back to baseline?  Yes  6. Activity and level of assistance: Ambulating independently.  7. Barriers to discharge noted Yes MRCP and Gen Surg consult    Discharge Planner Nurse   Safe discharge environment identified: Yes  Barriers to discharge: Yes       Entered by: Sharon Waters RN 07/18/2025 10:53 PM   Vitals are Temp: 100  F (37.8  C) Temp src: Oral BP: 128/70 Pulse: 83   Resp: 18 SpO2: 95 %.  Patient is Alert and Oriented x4. denying pain.  Patient has Lactated Ringer's running at 100 mL per hour.    Pt is a NPO and Ice chips diet. She is independent with no assistive devices . Vss on room air, BC are pending, Gen surg consulted, son at bedside.  Please review provider order for any additional goals.   Nurse to notify provider when observation goals have been met and patient is ready for discharge.

## 2025-07-20 ENCOUNTER — ANESTHESIA (OUTPATIENT)
Dept: SURGERY | Facility: CLINIC | Age: 75
DRG: 418 | End: 2025-07-20
Payer: COMMERCIAL

## 2025-07-20 ENCOUNTER — APPOINTMENT (OUTPATIENT)
Dept: GENERAL RADIOLOGY | Facility: CLINIC | Age: 75
DRG: 418 | End: 2025-07-20
Attending: STUDENT IN AN ORGANIZED HEALTH CARE EDUCATION/TRAINING PROGRAM
Payer: COMMERCIAL

## 2025-07-20 ENCOUNTER — ANESTHESIA EVENT (OUTPATIENT)
Dept: SURGERY | Facility: CLINIC | Age: 75
DRG: 418 | End: 2025-07-20
Payer: COMMERCIAL

## 2025-07-20 LAB
ALBUMIN SERPL BCG-MCNC: 3.7 G/DL (ref 3.5–5.2)
ALP SERPL-CCNC: 240 U/L (ref 40–150)
ALT SERPL W P-5'-P-CCNC: 339 U/L (ref 0–50)
ANION GAP SERPL CALCULATED.3IONS-SCNC: 13 MMOL/L (ref 7–15)
AST SERPL W P-5'-P-CCNC: 178 U/L (ref 0–45)
BASOPHILS # BLD AUTO: 0 10E3/UL (ref 0–0.2)
BASOPHILS NFR BLD AUTO: 1 %
BILIRUB DIRECT SERPL-MCNC: 1.73 MG/DL (ref 0–0.3)
BILIRUB SERPL-MCNC: 2.4 MG/DL
BUN SERPL-MCNC: 8.9 MG/DL (ref 8–23)
CALCIUM SERPL-MCNC: 8.9 MG/DL (ref 8.8–10.4)
CHLORIDE SERPL-SCNC: 108 MMOL/L (ref 98–107)
CREAT SERPL-MCNC: 0.58 MG/DL (ref 0.51–0.95)
EGFRCR SERPLBLD CKD-EPI 2021: >90 ML/MIN/1.73M2
EOSINOPHIL # BLD AUTO: 0.5 10E3/UL (ref 0–0.7)
EOSINOPHIL NFR BLD AUTO: 13 %
ERYTHROCYTE [DISTWIDTH] IN BLOOD BY AUTOMATED COUNT: 14.5 % (ref 10–15)
GLUCOSE BLDC GLUCOMTR-MCNC: 97 MG/DL (ref 70–99)
GLUCOSE SERPL-MCNC: 103 MG/DL (ref 70–99)
HCO3 SERPL-SCNC: 20 MMOL/L (ref 22–29)
HCT VFR BLD AUTO: 35 % (ref 35–47)
HGB BLD-MCNC: 11.6 G/DL (ref 11.7–15.7)
IMM GRANULOCYTES # BLD: 0 10E3/UL
IMM GRANULOCYTES NFR BLD: 0 %
LYMPHOCYTES # BLD AUTO: 1.1 10E3/UL (ref 0.8–5.3)
LYMPHOCYTES NFR BLD AUTO: 27 %
MCH RBC QN AUTO: 29 PG (ref 26.5–33)
MCHC RBC AUTO-ENTMCNC: 33.1 G/DL (ref 31.5–36.5)
MCV RBC AUTO: 88 FL (ref 78–100)
MONOCYTES # BLD AUTO: 0.7 10E3/UL (ref 0–1.3)
MONOCYTES NFR BLD AUTO: 16 %
NEUTROPHILS # BLD AUTO: 1.7 10E3/UL (ref 1.6–8.3)
NEUTROPHILS NFR BLD AUTO: 42 %
NRBC # BLD AUTO: 0 10E3/UL
NRBC BLD AUTO-RTO: 0 /100
PLATELET # BLD AUTO: 229 10E3/UL (ref 150–450)
POTASSIUM SERPL-SCNC: 4.1 MMOL/L (ref 3.4–5.3)
PROT SERPL-MCNC: 6.4 G/DL (ref 6.4–8.3)
RBC # BLD AUTO: 4 10E6/UL (ref 3.8–5.2)
SODIUM SERPL-SCNC: 141 MMOL/L (ref 135–145)
WBC # BLD AUTO: 4.1 10E3/UL (ref 4–11)

## 2025-07-20 PROCEDURE — 0FT44ZZ RESECTION OF GALLBLADDER, PERCUTANEOUS ENDOSCOPIC APPROACH: ICD-10-PCS | Performed by: STUDENT IN AN ORGANIZED HEALTH CARE EDUCATION/TRAINING PROGRAM

## 2025-07-20 PROCEDURE — 250N000011 HC RX IP 250 OP 636: Performed by: ANESTHESIOLOGY

## 2025-07-20 PROCEDURE — 85004 AUTOMATED DIFF WBC COUNT: CPT | Performed by: STUDENT IN AN ORGANIZED HEALTH CARE EDUCATION/TRAINING PROGRAM

## 2025-07-20 PROCEDURE — 258N000003 HC RX IP 258 OP 636: Performed by: ANESTHESIOLOGY

## 2025-07-20 PROCEDURE — 250N000009 HC RX 250: Performed by: ANESTHESIOLOGY

## 2025-07-20 PROCEDURE — 999N000179 XR SURGERY CARM FLUORO LESS THAN 5 MIN W STILLS

## 2025-07-20 PROCEDURE — 255N000002 HC RX 255 OP 636: Performed by: STUDENT IN AN ORGANIZED HEALTH CARE EDUCATION/TRAINING PROGRAM

## 2025-07-20 PROCEDURE — 84155 ASSAY OF PROTEIN SERUM: CPT | Performed by: STUDENT IN AN ORGANIZED HEALTH CARE EDUCATION/TRAINING PROGRAM

## 2025-07-20 PROCEDURE — 47563 LAPARO CHOLECYSTECTOMY/GRAPH: CPT | Performed by: STUDENT IN AN ORGANIZED HEALTH CARE EDUCATION/TRAINING PROGRAM

## 2025-07-20 PROCEDURE — 36415 COLL VENOUS BLD VENIPUNCTURE: CPT | Performed by: STUDENT IN AN ORGANIZED HEALTH CARE EDUCATION/TRAINING PROGRAM

## 2025-07-20 PROCEDURE — 272N000001 HC OR GENERAL SUPPLY STERILE: Performed by: STUDENT IN AN ORGANIZED HEALTH CARE EDUCATION/TRAINING PROGRAM

## 2025-07-20 PROCEDURE — 250N000009 HC RX 250: Performed by: STUDENT IN AN ORGANIZED HEALTH CARE EDUCATION/TRAINING PROGRAM

## 2025-07-20 PROCEDURE — 710N000009 HC RECOVERY PHASE 1, LEVEL 1, PER MIN: Performed by: STUDENT IN AN ORGANIZED HEALTH CARE EDUCATION/TRAINING PROGRAM

## 2025-07-20 PROCEDURE — 120N000001 HC R&B MED SURG/OB

## 2025-07-20 PROCEDURE — 250N000011 HC RX IP 250 OP 636: Performed by: STUDENT IN AN ORGANIZED HEALTH CARE EDUCATION/TRAINING PROGRAM

## 2025-07-20 PROCEDURE — 370N000017 HC ANESTHESIA TECHNICAL FEE, PER MIN: Performed by: STUDENT IN AN ORGANIZED HEALTH CARE EDUCATION/TRAINING PROGRAM

## 2025-07-20 PROCEDURE — 250N000013 HC RX MED GY IP 250 OP 250 PS 637: Performed by: INTERNAL MEDICINE

## 2025-07-20 PROCEDURE — 88304 TISSUE EXAM BY PATHOLOGIST: CPT | Mod: TC | Performed by: STUDENT IN AN ORGANIZED HEALTH CARE EDUCATION/TRAINING PROGRAM

## 2025-07-20 PROCEDURE — 88304 TISSUE EXAM BY PATHOLOGIST: CPT | Mod: 26 | Performed by: PATHOLOGY

## 2025-07-20 PROCEDURE — 999N000141 HC STATISTIC PRE-PROCEDURE NURSING ASSESSMENT: Performed by: STUDENT IN AN ORGANIZED HEALTH CARE EDUCATION/TRAINING PROGRAM

## 2025-07-20 PROCEDURE — 8E0W4CZ ROBOTIC ASSISTED PROCEDURE OF TRUNK REGION, PERCUTANEOUS ENDOSCOPIC APPROACH: ICD-10-PCS | Performed by: STUDENT IN AN ORGANIZED HEALTH CARE EDUCATION/TRAINING PROGRAM

## 2025-07-20 PROCEDURE — 250N000025 HC SEVOFLURANE, PER MIN: Performed by: STUDENT IN AN ORGANIZED HEALTH CARE EDUCATION/TRAINING PROGRAM

## 2025-07-20 PROCEDURE — 82310 ASSAY OF CALCIUM: CPT | Performed by: STUDENT IN AN ORGANIZED HEALTH CARE EDUCATION/TRAINING PROGRAM

## 2025-07-20 PROCEDURE — 99232 SBSQ HOSP IP/OBS MODERATE 35: CPT | Performed by: INTERNAL MEDICINE

## 2025-07-20 PROCEDURE — 258N000003 HC RX IP 258 OP 636: Performed by: STUDENT IN AN ORGANIZED HEALTH CARE EDUCATION/TRAINING PROGRAM

## 2025-07-20 PROCEDURE — 258N000001 HC RX 258: Performed by: STUDENT IN AN ORGANIZED HEALTH CARE EDUCATION/TRAINING PROGRAM

## 2025-07-20 PROCEDURE — 250N000013 HC RX MED GY IP 250 OP 250 PS 637: Performed by: STUDENT IN AN ORGANIZED HEALTH CARE EDUCATION/TRAINING PROGRAM

## 2025-07-20 PROCEDURE — 360N000080 HC SURGERY LEVEL 7, PER MIN: Performed by: STUDENT IN AN ORGANIZED HEALTH CARE EDUCATION/TRAINING PROGRAM

## 2025-07-20 RX ORDER — PROPOFOL 10 MG/ML
INJECTION, EMULSION INTRAVENOUS PRN
Status: DISCONTINUED | OUTPATIENT
Start: 2025-07-20 | End: 2025-07-20

## 2025-07-20 RX ORDER — ONDANSETRON 4 MG/1
4 TABLET, ORALLY DISINTEGRATING ORAL EVERY 30 MIN PRN
Status: CANCELLED | OUTPATIENT
Start: 2025-07-20

## 2025-07-20 RX ORDER — KETOROLAC TROMETHAMINE 15 MG/ML
15 INJECTION, SOLUTION INTRAMUSCULAR; INTRAVENOUS
Status: CANCELLED | OUTPATIENT
Start: 2025-07-20

## 2025-07-20 RX ORDER — KETOROLAC TROMETHAMINE 30 MG/ML
INJECTION, SOLUTION INTRAMUSCULAR; INTRAVENOUS PRN
Status: DISCONTINUED | OUTPATIENT
Start: 2025-07-20 | End: 2025-07-20

## 2025-07-20 RX ORDER — LABETALOL HYDROCHLORIDE 5 MG/ML
10 INJECTION, SOLUTION INTRAVENOUS
Status: CANCELLED | OUTPATIENT
Start: 2025-07-20

## 2025-07-20 RX ORDER — LIDOCAINE 40 MG/G
CREAM TOPICAL
Status: DISCONTINUED | OUTPATIENT
Start: 2025-07-20 | End: 2025-07-20 | Stop reason: HOSPADM

## 2025-07-20 RX ORDER — ONDANSETRON 2 MG/ML
4 INJECTION INTRAMUSCULAR; INTRAVENOUS EVERY 30 MIN PRN
Status: CANCELLED | OUTPATIENT
Start: 2025-07-20

## 2025-07-20 RX ORDER — CEFAZOLIN SODIUM/WATER 2 G/20 ML
2 SYRINGE (ML) INTRAVENOUS
Status: DISCONTINUED | OUTPATIENT
Start: 2025-07-20 | End: 2025-07-20 | Stop reason: HOSPADM

## 2025-07-20 RX ORDER — METHADONE HYDROCHLORIDE 10 MG/ML
INJECTION, SOLUTION INTRAMUSCULAR; INTRAVENOUS; SUBCUTANEOUS PRN
Status: DISCONTINUED | OUTPATIENT
Start: 2025-07-20 | End: 2025-07-20

## 2025-07-20 RX ORDER — INDOCYANINE GREEN AND WATER 25 MG
2.5 KIT INJECTION ONCE
Status: COMPLETED | OUTPATIENT
Start: 2025-07-20 | End: 2025-07-20

## 2025-07-20 RX ORDER — MAGNESIUM SULFATE HEPTAHYDRATE 40 MG/ML
2 INJECTION, SOLUTION INTRAVENOUS
Status: CANCELLED | OUTPATIENT
Start: 2025-07-20

## 2025-07-20 RX ORDER — SODIUM CHLORIDE, SODIUM LACTATE, POTASSIUM CHLORIDE, CALCIUM CHLORIDE 600; 310; 30; 20 MG/100ML; MG/100ML; MG/100ML; MG/100ML
INJECTION, SOLUTION INTRAVENOUS CONTINUOUS PRN
Status: DISCONTINUED | OUTPATIENT
Start: 2025-07-20 | End: 2025-07-20

## 2025-07-20 RX ORDER — BUPIVACAINE HYDROCHLORIDE AND EPINEPHRINE 5; 5 MG/ML; UG/ML
INJECTION, SOLUTION EPIDURAL; INTRACAUDAL; PERINEURAL PRN
Status: DISCONTINUED | OUTPATIENT
Start: 2025-07-20 | End: 2025-07-20 | Stop reason: HOSPADM

## 2025-07-20 RX ORDER — DEXAMETHASONE SODIUM PHOSPHATE 4 MG/ML
4 INJECTION, SOLUTION INTRA-ARTICULAR; INTRALESIONAL; INTRAMUSCULAR; INTRAVENOUS; SOFT TISSUE
Status: CANCELLED | OUTPATIENT
Start: 2025-07-20

## 2025-07-20 RX ORDER — DEXAMETHASONE SODIUM PHOSPHATE 4 MG/ML
INJECTION, SOLUTION INTRA-ARTICULAR; INTRALESIONAL; INTRAMUSCULAR; INTRAVENOUS; SOFT TISSUE PRN
Status: DISCONTINUED | OUTPATIENT
Start: 2025-07-20 | End: 2025-07-20

## 2025-07-20 RX ORDER — SODIUM CHLORIDE, SODIUM LACTATE, POTASSIUM CHLORIDE, CALCIUM CHLORIDE 600; 310; 30; 20 MG/100ML; MG/100ML; MG/100ML; MG/100ML
INJECTION, SOLUTION INTRAVENOUS CONTINUOUS
Status: CANCELLED | OUTPATIENT
Start: 2025-07-20

## 2025-07-20 RX ORDER — LIDOCAINE HYDROCHLORIDE 20 MG/ML
INJECTION, SOLUTION INFILTRATION; PERINEURAL PRN
Status: DISCONTINUED | OUTPATIENT
Start: 2025-07-20 | End: 2025-07-20

## 2025-07-20 RX ORDER — DIPHENHYDRAMINE HCL 12.5MG/5ML
12.5 LIQUID (ML) ORAL EVERY 6 HOURS PRN
Status: CANCELLED | OUTPATIENT
Start: 2025-07-20

## 2025-07-20 RX ORDER — ALBUTEROL SULFATE 0.83 MG/ML
2.5 SOLUTION RESPIRATORY (INHALATION) EVERY 4 HOURS PRN
Status: CANCELLED | OUTPATIENT
Start: 2025-07-20

## 2025-07-20 RX ORDER — CEFAZOLIN SODIUM/WATER 2 G/20 ML
2 SYRINGE (ML) INTRAVENOUS SEE ADMIN INSTRUCTIONS
Status: DISCONTINUED | OUTPATIENT
Start: 2025-07-20 | End: 2025-07-20 | Stop reason: HOSPADM

## 2025-07-20 RX ORDER — ATORVASTATIN CALCIUM 40 MG/1
80 TABLET, FILM COATED ORAL EVERY EVENING
Status: DISCONTINUED | OUTPATIENT
Start: 2025-07-20 | End: 2025-07-21 | Stop reason: HOSPADM

## 2025-07-20 RX ORDER — METHADONE HYDROCHLORIDE 10 MG/ML
2 INJECTION, SOLUTION INTRAMUSCULAR; INTRAVENOUS; SUBCUTANEOUS 3 TIMES DAILY PRN
Status: DISCONTINUED | OUTPATIENT
Start: 2025-07-20 | End: 2025-07-20 | Stop reason: HOSPADM

## 2025-07-20 RX ORDER — HYDRALAZINE HYDROCHLORIDE 20 MG/ML
10 INJECTION INTRAMUSCULAR; INTRAVENOUS EVERY 10 MIN PRN
Status: CANCELLED | OUTPATIENT
Start: 2025-07-20

## 2025-07-20 RX ORDER — NALOXONE HYDROCHLORIDE 0.4 MG/ML
0.1 INJECTION, SOLUTION INTRAMUSCULAR; INTRAVENOUS; SUBCUTANEOUS
Status: CANCELLED | OUTPATIENT
Start: 2025-07-20

## 2025-07-20 RX ORDER — DIPHENHYDRAMINE HYDROCHLORIDE 50 MG/ML
12.5 INJECTION, SOLUTION INTRAMUSCULAR; INTRAVENOUS EVERY 6 HOURS PRN
Status: CANCELLED | OUTPATIENT
Start: 2025-07-20

## 2025-07-20 RX ORDER — ONDANSETRON 2 MG/ML
INJECTION INTRAMUSCULAR; INTRAVENOUS PRN
Status: DISCONTINUED | OUTPATIENT
Start: 2025-07-20 | End: 2025-07-20

## 2025-07-20 RX ORDER — SODIUM CHLORIDE, SODIUM LACTATE, POTASSIUM CHLORIDE, CALCIUM CHLORIDE 600; 310; 30; 20 MG/100ML; MG/100ML; MG/100ML; MG/100ML
INJECTION, SOLUTION INTRAVENOUS CONTINUOUS
Status: DISCONTINUED | OUTPATIENT
Start: 2025-07-20 | End: 2025-07-20 | Stop reason: HOSPADM

## 2025-07-20 RX ADMIN — MIDAZOLAM 2 MG: 1 INJECTION INTRAMUSCULAR; INTRAVENOUS at 11:28

## 2025-07-20 RX ADMIN — SODIUM CHLORIDE, SODIUM LACTATE, POTASSIUM CHLORIDE, AND CALCIUM CHLORIDE: .6; .31; .03; .02 INJECTION, SOLUTION INTRAVENOUS at 05:14

## 2025-07-20 RX ADMIN — SODIUM CHLORIDE, POTASSIUM CHLORIDE, SODIUM LACTATE AND CALCIUM CHLORIDE 1000 ML: 600; 310; 30; 20 INJECTION, SOLUTION INTRAVENOUS at 09:48

## 2025-07-20 RX ADMIN — PHENYLEPHRINE HYDROCHLORIDE 100 MCG: 10 INJECTION INTRAVENOUS at 11:33

## 2025-07-20 RX ADMIN — PHENYLEPHRINE HYDROCHLORIDE 100 MCG: 10 INJECTION INTRAVENOUS at 11:35

## 2025-07-20 RX ADMIN — DEXMEDETOMIDINE HYDROCHLORIDE 0.5 MCG/KG/HR: 100 INJECTION, SOLUTION INTRAVENOUS at 11:33

## 2025-07-20 RX ADMIN — ROCURONIUM BROMIDE 50 MG: 50 INJECTION, SOLUTION INTRAVENOUS at 11:33

## 2025-07-20 RX ADMIN — ONDANSETRON 4 MG: 2 INJECTION INTRAMUSCULAR; INTRAVENOUS at 11:33

## 2025-07-20 RX ADMIN — METOPROLOL TARTRATE 12.5 MG: 25 TABLET, FILM COATED ORAL at 08:37

## 2025-07-20 RX ADMIN — PHENYLEPHRINE HYDROCHLORIDE 50 MCG: 10 INJECTION INTRAVENOUS at 11:52

## 2025-07-20 RX ADMIN — PIPERACILLIN AND TAZOBACTAM 3.38 G: 3; .375 INJECTION, POWDER, FOR SOLUTION INTRAVENOUS at 22:33

## 2025-07-20 RX ADMIN — DEXAMETHASONE SODIUM PHOSPHATE 8 MG: 4 INJECTION, SOLUTION INTRA-ARTICULAR; INTRALESIONAL; INTRAMUSCULAR; INTRAVENOUS; SOFT TISSUE at 11:33

## 2025-07-20 RX ADMIN — SODIUM CHLORIDE, SODIUM LACTATE, POTASSIUM CHLORIDE, AND CALCIUM CHLORIDE: .6; .31; .03; .02 INJECTION, SOLUTION INTRAVENOUS at 13:15

## 2025-07-20 RX ADMIN — PIPERACILLIN AND TAZOBACTAM 3.38 G: 3; .375 INJECTION, POWDER, FOR SOLUTION INTRAVENOUS at 17:36

## 2025-07-20 RX ADMIN — Medication 2 G: at 11:20

## 2025-07-20 RX ADMIN — INDOCYANINE GREEN AND WATER 2.5 MG: KIT at 09:31

## 2025-07-20 RX ADMIN — KETOROLAC TROMETHAMINE 15 MG: 30 INJECTION, SOLUTION INTRAMUSCULAR at 11:33

## 2025-07-20 RX ADMIN — PROPOFOL 140 MG: 10 INJECTION, EMULSION INTRAVENOUS at 11:33

## 2025-07-20 RX ADMIN — LIDOCAINE HYDROCHLORIDE 50 MG: 20 INJECTION, SOLUTION INFILTRATION; PERINEURAL at 11:33

## 2025-07-20 RX ADMIN — SODIUM CHLORIDE, SODIUM LACTATE, POTASSIUM CHLORIDE, AND CALCIUM CHLORIDE: .6; .31; .03; .02 INJECTION, SOLUTION INTRAVENOUS at 11:22

## 2025-07-20 RX ADMIN — PIPERACILLIN AND TAZOBACTAM 3.38 G: 3; .375 INJECTION, POWDER, FOR SOLUTION INTRAVENOUS at 05:16

## 2025-07-20 RX ADMIN — PHENYLEPHRINE HYDROCHLORIDE 100 MCG: 10 INJECTION INTRAVENOUS at 12:29

## 2025-07-20 RX ADMIN — ATORVASTATIN CALCIUM 80 MG: 40 TABLET, FILM COATED ORAL at 19:30

## 2025-07-20 RX ADMIN — Medication 10 MG: at 11:33

## 2025-07-20 ASSESSMENT — ACTIVITIES OF DAILY LIVING (ADL)
ADLS_ACUITY_SCORE: 25
ADLS_ACUITY_SCORE: 26
ADLS_ACUITY_SCORE: 25
ADLS_ACUITY_SCORE: 26
ADLS_ACUITY_SCORE: 25

## 2025-07-20 NOTE — PROGRESS NOTES
"GI Update    The patient went to the OR today for cholecystectomy.  The following is noted in the OR note:  \"  Dilated gallbladder with possible debris seen near sphincter of oddi on cholangiogram but patent bile duct system. \"    Plan:  We will check LFTs tomorrow.  If the patient continues to have elevated liver function chemistries then we may need to pursue ERCP.  We will follow with you.  "

## 2025-07-20 NOTE — PLAN OF CARE
"INPATIENT NOTE: CARES PROVIDED FROM [id number]  Diagnosis:  Cholecystectomy   Vital signs:  Temp: 98.2  F (36.8  C) Temp src: Oral BP: 112/58 Pulse: 58   Resp: 16 SpO2: 96 % O2 Device: None (Room air)        Labs: ,   Cardiac: WKL, pt has a murmur, pt had a low HR when coming back from surgery and I let the provider aware. HR ended up going back up to normal range.   Resp: WKL, pt denies SOB, lightheaded, and dizziness.   Neuro: A&Ox4  GI:A&Ax4, pt had a BM this morning and feels like another BM might happened tonight   :WKL  Skin:5 incisions that are closed with glue and open to air. No drainage at sites.   Activity:SBA   Diet: low fat diet   Pain: pt denies pain, gave ice pack for some abdominal discomfort of incisions sites.   Lines: IVSL     Plan: Re draw LFT in the AM,       Goal Outcome Evaluation:      Plan of Care Reviewed With: patient    Overall Patient Progress: improvingOverall Patient Progress: improving    Outcome Evaluation: A&Ox4, pt denies pain. Indepednent in room.      Problem: Adult Inpatient Plan of Care  Goal: Plan of Care Review  Description: The Plan of Care Review/Shift note should be completed every shift.  The Outcome Evaluation is a brief statement about your assessment that the patient is improving, declining, or no change.  This information will be displayed automatically on your shift  note.  Outcome: Progressing  Flowsheets (Taken 7/20/2025 1856)  Outcome Evaluation: A&Ox4, pt denies pain. Indepednent in room.  Plan of Care Reviewed With: patient  Overall Patient Progress: improving  Goal: Patient-Specific Goal (Individualized)  Description: You can add care plan individualizations to a care plan. Examples of Individualization might be:  \"Parent requests to be called daily at 9am for status\", \"I have a hard time hearing out of my right ear\", or \"Do not touch me to wake me up as it startles  me\".  Outcome: Progressing  Goal: Absence of Hospital-Acquired Illness or " Injury  Outcome: Progressing  Intervention: Identify and Manage Fall Risk  Recent Flowsheet Documentation  Taken 7/20/2025 1551 by Marizol Decker RN  Safety Promotion/Fall Prevention: safety round/check completed  Taken 7/20/2025 1431 by Marizol Decker RN  Safety Promotion/Fall Prevention: safety round/check completed  Taken 7/20/2025 0832 by Marizol Decker RN  Safety Promotion/Fall Prevention: safety round/check completed  Intervention: Prevent and Manage VTE (Venous Thromboembolism) Risk  Recent Flowsheet Documentation  Taken 7/20/2025 1551 by Marizol Decker RN  VTE Prevention/Management: SCDs on (sequential compression devices)  Taken 7/20/2025 0832 by Marizol Decker RN  VTE Prevention/Management: SCDs off (sequential compression devices)  Intervention: Prevent Infection  Recent Flowsheet Documentation  Taken 7/20/2025 1551 by Marizol Decker RN  Infection Prevention: single patient room provided  Taken 7/20/2025 0832 by Marizol Decker RN  Infection Prevention: single patient room provided  Goal: Optimal Comfort and Wellbeing  Outcome: Progressing  Intervention: Monitor Pain and Promote Comfort  Recent Flowsheet Documentation  Taken 7/20/2025 1700 by Marizol Decker RN  Pain Management Interventions: cold applied  Taken 7/20/2025 1515 by Marizol Decker RN  Pain Management Interventions: cold applied  Goal: Readiness for Transition of Care  Outcome: Progressing

## 2025-07-20 NOTE — PROGRESS NOTES
Hospitalist Medicine Progress Note   [facility]       Pamela Engle is a 74 year old lady with severe aortic stenosis being worked up for TAVR and in the process of workup underwent coronary angiogram which showed a single-vessel RCA disease which was stented and started on aspirin and Brilinta 5/28/2025, came into [facility] 7/18/2025 with 4-day history of chest discomfort, malaise, flulike symptoms, rigors and chills with a temperature of 101.2  F and symptoms getting worse her AST was 194  alkaline phosphatase 236 total bilirubin 2 and direct bilirubin 1.6 ultrasound of the abdomen showed distention of gallbladder and CT scan showed indications of cholecystitis MRCP done 7/18/2025 showed distended gallbladder with normal intra and extrahepatic biliary ducts without any intraductal stones or strictures.  Aspirin was withheld with the plan to restart it as soon as it is possible by cardiology.  On 7/20/2025 patient underwent robot-assisted laparoscopic cholecystectomy with intraoperative cholangiogram by Dr. Chasidy Swift.  There was possible debris seen near sphincter of Oddi on cholangiogram       Date of Admission:  7/18/2025  Assessment & Plan     Acute cholecystitis  ?  Debris seen near sphincter of Oddi on IOC intraoperative cholangiogram  Status post robot-assisted laparoscopic cholecystectomy by Dr. Swift 7/20/2025  Patient came in with fever, malaise, chest pain, elevated liver enzymes and ultrasound indicated for cholecystitis though MRCP was not indicated of any intra biliary stones and intraoperative cholangiogram showed debris's near sphincter of Oddi.  Was started on Zosyn 7/18/2025  Patient will be monitored in the hospital for another day to monitor for bleeding as well as amylase post cholangiogram    Chest pain:  Coronary artery disease:  Severe aortic stenosis:  - Clinically does not appear to be cardiac pain and has had negative troponin and EKG. Likely  "referred from hepatobiliary area.  - Given recent RCA is drug-eluting stent on 5/28/2025, patient was on aspirin and Brilinta.  Discussed with cardiology, resume aspirin and okay to hold Brilinta till definitive surgical treatment is done for cholecystitis.  - Patient with aortic stenosis as a asymptomatic and was found on routine auscultation.  Given the severity she is being considered for TAVR versus surgical aortic valve replacement and is being closely followed every 3 months.     Hyperlipidemia:  - Hold Lipitor due to transaminitis.        Plan:   Start oral low-fat low-cholesterol diet  Patient is already started on aspirin needs to closely monitor for bleeding  Check amylase and LFTs in a.m.  Restart Lipitor  Monitor closely for hypotension as well as CHF with severe aortic stenosis      Diet: Regular Diet Adult    DVT Prophylaxis: Pneumatic Compression Devices  Duran Catheter: Not present  Code Status: Full Code         Medically Ready for Discharge: Anticipated Tomorrow    Clinically Significant Risk Factors          # Hyperchloremia: Highest Cl = 108 mmol/L in last 2 days, will monitor as appropriate                         # Obesity: Estimated body mass index is 31.19 kg/m  as calculated from the following:    Height as of this encounter: 1.486 m (4' 10.5\").    Weight as of this encounter: 68.9 kg (151 lb 12.8 oz)., PRESENT ON ADMISSION                  The patient's care was discussed with the Patient and surgeon Dr Jamison CHU .    Patrice Johnson MD  Hospitalist Service  [facility]    ______________________________________________________________________    Interval History     Symptoms   Patient had surgery today.  Does not remember anything from surgery time.  Denies any abdominal pain at this time    Review of Systems:   Denies any chest pain or shortness of breath  Wants to eat    Data reviewed today: I reviewed all medications, new labs and imaging results over the last 24 hours. "     Physical Exam   Vital Signs: Temp: 97.2  F (36.2  C) Temp src: Temporal BP: 127/63 Pulse: 61   Resp: 12 SpO2: 96 % O2 Device: Nasal cannula Oxygen Delivery: 2 LPM  Weight: 151 lbs 12.8 oz      GENERAL: Patient is not in acute distress  HEENT: EOM+,Conjunctiva is clear   NECK:  no Jugular Venous distention  HEART: S1 S2 regular Rate and Rhythm, there is ejection systolic murmur,   LUNGS: Respirations are  not laboured, Lungs are  clear to auscultation without Crepitations or Wheezing   ABDOMEN: Soft, there is minimal tenderness, Bowel Sounds are Positive   CNS:  Alert,  Oriented x 3, Moving all the Four Limbs     Data   Recent Labs   Lab 07/20/25  0926 07/20/25  0612 07/19/25  0441 07/18/25  1121   WBC  --  4.1 4.3 7.2   HGB  --  11.6* 11.6* 13.9   MCV  --  88 87 89   PLT  --  229 212 254   INR  --   --   --  0.90   NA  --  141 137 139   POTASSIUM  --  4.1 3.5 3.9   CHLORIDE  --  108* 104 102   CO2  --  20* 20* 23   BUN  --  8.9 11.9 13.5   CR  --  0.58 0.62 0.70   ANIONGAP  --  13 13 14   HUGO  --  8.9 8.9 9.5   GLC 97 103* 85 111*   ALBUMIN  --  3.7 3.8 4.6   PROTTOTAL  --  6.4 6.3* 7.4   BILITOTAL  --  2.4* 3.0* 2.0*   ALKPHOS  --  240* 210* 236*   ALT  --  339* 368* 380*   AST  --  178* 222* 194*   LIPASE  --   --   --  25         Recent Results (from the past 24 hours)   XR Surgery TOMER Fluoro Less Than 5 Min w Stills    Narrative    This exam was marked as non-reportable because it will not be read by a   radiologist or a [place] non-radiologist provider.

## 2025-07-20 NOTE — PLAN OF CARE
"[id number]    Inpatient Progress Note:    BP (!) 140/66 (BP Location: Right arm)   Pulse 68   Temp 98.4  F (36.9  C) (Oral)   Resp 16   Ht 1.486 m (4' 10.5\")   Wt 68.9 kg (151 lb 12.8 oz)   SpO2 98%   BMI 31.19 kg/m         Orientation: A&Ox4  Pain status: Prn medication  Activity: Ind  Resp: WDL, RA  Cardiac: WDL  GI: X, abd pain (CT- Cholecystitis)   :  WLD  LDA: PIV  Infusions: SL  Pertinent Labs: LFT draw in am  Diet: NPO  Consults: Cardiology/GI/Surgery  Discharge Plan: Cholecystectomy today 7/20     Will continue to monitor and provide cares.     Karina Valle RN       Problem: Adult Inpatient Plan of Care  Goal: Plan of Care Review  Description: The Plan of Care Review/Shift note should be completed every shift.  The Outcome Evaluation is a brief statement about your assessment that the patient is improving, declining, or no change.  This information will be displayed automatically on your shift  note.  Outcome: Progressing  Flowsheets (Taken 7/20/2025 0302)  Outcome Evaluation: A&Ox4, up ind, abd pain, CT- cholecystitis, Plan for cholecystectomy 7/20 @1010. NPO since midnight. Cardiology/GI/Surgey following  Plan of Care Reviewed With: patient  Overall Patient Progress: improving  Goal: Patient-Specific Goal (Individualized)  Description: You can add care plan individualizations to a care plan. Examples of Individualization might be:  \"Parent requests to be called daily at 9am for status\", \"I have a hard time hearing out of my right ear\", or \"Do not touch me to wake me up as it startles  me\".  Outcome: Progressing  Goal: Absence of Hospital-Acquired Illness or Injury  Outcome: Progressing  Intervention: Identify and Manage Fall Risk  Recent Flowsheet Documentation  Taken 7/20/2025 0133 by Karina Valle RN  Safety Promotion/Fall Prevention: safety round/check completed  Intervention: Prevent and Manage VTE (Venous Thromboembolism) Risk  Recent Flowsheet Documentation  Taken 7/20/2025 0133 by " Karina Valle RN  VTE Prevention/Management: SCDs off (sequential compression devices)  Intervention: Prevent Infection  Recent Flowsheet Documentation  Taken 7/20/2025 0133 by Karina Valle RN  Infection Prevention: single patient room provided  Goal: Optimal Comfort and Wellbeing  Outcome: Progressing  Goal: Readiness for Transition of Care  Outcome: Progressing     Problem: Delirium  Goal: Optimal Coping  Outcome: Progressing  Goal: Improved Behavioral Control  Outcome: Progressing  Intervention: Minimize Safety Risk  Recent Flowsheet Documentation  Taken 7/20/2025 0133 by Karina Valle RN  Enhanced Safety Measures: review medications for side effects with activity  Goal: Improved Attention and Thought Clarity  Outcome: Progressing  Goal: Improved Sleep  Outcome: Progressing   Goal Outcome Evaluation:      Plan of Care Reviewed With: patient    Overall Patient Progress: improvingOverall Patient Progress: improving    Outcome Evaluation: A&Ox4, up ind, abd pain, CT- cholecystitis, Plan for cholecystectomy 7/20 @1010. NPO since midnight. Cardiology/GI/Surgey following

## 2025-07-20 NOTE — ANESTHESIA PREPROCEDURE EVALUATION
Anesthesia Pre-Procedure Evaluation    Patient: Pamela Engle   MRN: [id number] : 1950          Procedure : Procedure(s):  CHOLECYSTECTOMY, ROBOT-ASSISTED, LAPAROSCOPIC, WITH CHOLANGIOGRAM         Past Medical History:   Diagnosis Date    Anemia     after surgery    Aortic stenosis     cardiology visit follow up due     Arthritis     Complication of anesthesia     Gastroesophageal reflux disease     rarely    Hepatitis     hepatitis A as a child    History of anesthesia complications     Tremors after surgery     Primary insomnia 10/5/2020      Past Surgical History:   Procedure Laterality Date    ARTHROSCOPY KNEE Left     ARTHROSCOPY KNEE Right     BIOPSY      bone marrow donator    BUNIONECTOMY PORTER Bilateral     and hammer toe surgery    CV CORONARY ANGIOGRAM N/A 2025    Procedure: Coronary Angiogram;  Surgeon: Norris Maguire MD;  Location: [facility] CATH LAB CV    CV LEFT HEART CATH N/A 2025    Procedure: Left Heart Catheterization;  Surgeon: Norris Maguire MD;  Location: [facility] CV    CV PCI STENT DRUG ELUTING N/A 2025    Procedure: Percutaneous Coronary Intervention Stent;  Surgeon: Norris Maguire MD;  Location: [facility] CATH LAB CV    ENT SURGERY      EXCISE MASS TRUNK N/A 2019    Procedure: EXCISION SUBCUTANEOUS MASS UPPER CHEST MIDLINE;  Surgeon: Karla Vasquez MD;  Location: [facility] OR    EXCISE CASTANO'S NEUROMA FOOT  2007 and  redo 2009    GYN SURGERY  1984    tubal ligation    ORTHOPEDIC SURGERY Right 2019    rotator cuff repair    SOFT TISSUE SURGERY  2012    lipoma removed from back      Allergies   Allergen Reactions    Hydrocodone Swelling      Social History     Tobacco Use    Smoking status: Former     Current packs/day: 0.00     Average packs/day: 1 pack/day for 41.1 years (41.1 ttl pk-yrs)     Types: Cigarettes     Start date: 1969     Quit date: 2010     Years since quitting: 15.4    Smokeless  tobacco: Never   Substance Use Topics    Alcohol use: Yes     Alcohol/week: 2.0 standard drinks of alcohol     Comment: 1-2 drinks per week      Wt Readings from Last 1 Encounters:   07/18/25 68.9 kg (151 lb 12.8 oz)        Anesthesia Evaluation            ROS/MED HX  ENT/Pulmonary:  - neg pulmonary ROS     Neurologic:  - neg neurologic ROS     Cardiovascular:     (+)  - -   -  - stent-05/25.  Drug Eluting Stent. Taking blood thinners                        valvular problems/murmurs type: AS Severe.    Previous cardiac testing   Echo: Date: 05/25 Results:  Left Ventricle  The left ventricle is normal in size. There is normal left ventricular wall  thickness. The visual ejection fraction is 60-65%. No regional wall motion  abnormalities noted.     Right Ventricle  The right ventricle is normal in size and function.     Atria  Normal left atrial size. Right atrial size is normal. Intact atrial septum.     Mitral Valve  Mitral valve leaflets appear normal. There is trace mitral regurgitation.     Tricuspid Valve  Tricuspid valve leaflets appear normal. There is trace tricuspid  regurgitation.     Aortic Valve  Moderate aortic valve calcification is present. No aortic regurgitation is  present. Moderate to severe valvular aortic stenosis. The mean AoV pressure  gradient is 27mmHg. Velocity ratio 0.26.     Pulmonic Valve  The pulmonic valve is not well seen, but is grossly normal. There is trace  pulmonic valvular regurgitation.     Vessels  The aorta root is normal. Normal size ascending aorta. IVC diameter <2.1 cm  collapsing >50% with sniff suggests a normal RA pressure of 3 mmHg.     Pericardium  There is no pericardial effusion.    Stress Test:  Date: Results:    ECG Reviewed:  Date: Results:    Cath:  Date: Results:      METS/Exercise Tolerance:     Hematologic:     (+)      anemia,          Musculoskeletal:   (+)  arthritis,             GI/Hepatic:     (+) GERD,    hepatitis resolved      "cholecystitis/cholelithiasis (possible ascending cholangitis), hepatitis type A, liver disease,       Renal/Genitourinary:  - neg Renal ROS     Endo:     (+)               Obesity,       Psychiatric/Substance Use:  - neg psychiatric ROS     Infectious Disease:       Malignancy:       Other:              Physical Exam  Airway  Mallampati: II  TM distance: >3 FB  Neck ROM: full  Mouth opening: >= 4 cm    Cardiovascular   Rhythm: regular  Rate: normal rate     Dental   (+) Modest Abnormalities - crowns, retainers, 1 or 2 missing teeth      Pulmonary - normal exam      Neurological - normal exam  She appears awake, alert and oriented x3.    Other Findings       OUTSIDE LABS:  CBC:   Lab Results   Component Value Date    WBC 4.1 07/20/2025    WBC 4.3 07/19/2025    HGB 11.6 (L) 07/20/2025    HGB 11.6 (L) 07/19/2025    HCT 35.0 07/20/2025    HCT 35.4 07/19/2025     07/20/2025     07/19/2025     BMP:   Lab Results   Component Value Date     07/20/2025     07/19/2025    POTASSIUM 4.1 07/20/2025    POTASSIUM 3.5 07/19/2025    CHLORIDE 108 (H) 07/20/2025    CHLORIDE 104 07/19/2025    CO2 20 (L) 07/20/2025    CO2 20 (L) 07/19/2025    BUN 8.9 07/20/2025    BUN 11.9 07/19/2025    CR 0.58 07/20/2025    CR 0.62 07/19/2025     (H) 07/20/2025    GLC 85 07/19/2025     COAGS:   Lab Results   Component Value Date    INR 0.90 07/18/2025     POC: No results found for: \"BGM\", \"HCG\", \"HCGS\"  HEPATIC:   Lab Results   Component Value Date    ALBUMIN 3.7 07/20/2025    PROTTOTAL 6.4 07/20/2025     (H) 07/20/2025     (H) 07/20/2025    ALKPHOS 240 (H) 07/20/2025    BILITOTAL 2.4 (H) 07/20/2025     OTHER:   Lab Results   Component Value Date    LACT 1.0 07/18/2025    A1C 5.4 09/12/2024    HUGO 8.9 07/20/2025    MAG 2.4 (H) 07/18/2022    LIPASE 25 07/18/2025    TSH 1.98 09/12/2024       Anesthesia Plan    ASA Status:  4, emergent      NPO Status: NPO Appropriate   Anesthesia Type: General.  Airway: " "oral.  Induction: intravenous.  Maintenance: Balanced.   Techniques and Equipment:       - Monitoring Plan: standard ASA monitoring     Consents    Anesthesia Plan(s) and associated risks, benefits, and realistic alternatives discussed. Questions answered and patient/representative(s) expressed understanding.     - Discussed: anesthesiologist     - Discussed with:  Patient        - Pt is DNR/DNI Status: no DNR     Blood Consent:      - Discussed with: not discussed.     Postoperative Care    Pain management: non-narcotic analgesics, plan for postoperative opioid use, multimodal analgesia.     Comments:    Other Comments:   Methadone 10 mg  Decadron 8 mg  Zofran 4 mg  Toradol 15 mg  Precedex  Sugammadex if paralytic to be used   Phenylephrine infusion    Damaris Marshall MD        Clinically Significant Risk Factors          # Hyperchloremia: Highest Cl = 108 mmol/L in last 2 days, will monitor as appropriate                         # Obesity: Estimated body mass index is 31.19 kg/m  as calculated from the following:    Height as of this encounter: 1.486 m (4' 10.5\").    Weight as of this encounter: 68.9 kg (151 lb 12.8 oz)., PRESENT ON ADMISSION                  "

## 2025-07-20 NOTE — ANESTHESIA POSTPROCEDURE EVALUATION
Patient: Pamela Engle    Procedure: Procedure(s):  CHOLECYSTECTOMY, ROBOT-ASSISTED, LAPAROSCOPIC, WITH CHOLANGIOGRAM       Anesthesia Type:  General    Note:  Disposition: Inpatient   Postop Pain Control: Uneventful            Sign Out: Well controlled pain   PONV: No   Neuro/Psych: Uneventful            Sign Out: Acceptable/Baseline neuro status   Airway/Respiratory: Uneventful            Sign Out: Acceptable/Baseline resp. status   CV/Hemodynamics: Uneventful            Sign Out: Acceptable CV status   Other NRE: NONE   DID A NON-ROUTINE EVENT OCCUR? No           Last vitals:  Vitals Value Taken Time   /67 07/20/25 13:20   Temp 97.16  F (36.2  C) 07/20/25 13:23   Pulse 60 07/20/25 13:23   Resp 15 07/20/25 13:23   SpO2 95 % 07/20/25 13:23   Vitals shown include unfiled device data.    Electronically Signed By: Jimy Marshall MD  July 20, 2025  1:24 PM

## 2025-07-20 NOTE — ANESTHESIA CARE TRANSFER NOTE
Patient: Pamela Engle    Procedure: Procedure(s):  CHOLECYSTECTOMY, ROBOT-ASSISTED, LAPAROSCOPIC, WITH CHOLANGIOGRAM       Diagnosis: Acalculous cholecystitis [K81.9]  Diagnosis Additional Information: No value filed.    Anesthesia Type:   General     Note:    Oropharynx: oropharynx clear of all foreign objects  Level of Consciousness: awake and drowsy  Oxygen Supplementation: face mask  Level of Supplemental Oxygen (L/min / FiO2): 8  Independent Airway: airway patency satisfactory and stable  Dentition: dentition unchanged  Vital Signs Stable: post-procedure vital signs reviewed and stable  Report to RN Given: handoff report given  Patient transferred to: PACU    Handoff Report: Identifed the Patient, Identified the Reponsible Provider, Reviewed the pertinent medical history, Discussed the surgical course, Reviewed Intra-OP anesthesia mangement and issues during anesthesia, Set expectations for post-procedure period and Allowed opportunity for questions and acknowledgement of understanding      Vitals:  Vitals Value Taken Time   /64 07/20/25 13:18   Temp 97.16  F (36.2  C) 07/20/25 13:20   Pulse 63 07/20/25 13:20   Resp 31 07/20/25 13:20   SpO2 96 % 07/20/25 13:20   Vitals shown include unfiled device data.    Electronically Signed By: LA Escalante CRNA  July 20, 2025  1:22 PM

## 2025-07-20 NOTE — ANESTHESIA PROCEDURE NOTES
Airway       Patient location during procedure: OR       Procedure Start/Stop Times: 7/20/2025 11:35 AM  Staff -        Anesthesiologist:  Jimy Marshall MD       CRNA: Marcie Preston APRN CRNA       Performed By: CRNA  Consent for Airway        Urgency: elective  Indications and Patient Condition       Indications for airway management: liliam-procedural       Induction type:intravenous       Mask difficulty assessment: 1 - vent by mask    Final Airway Details       Final airway type: endotracheal airway       Successful airway: ETT - single and Oral  Endotracheal Airway Details        ETT size (mm): 7.0       Successful intubation technique: direct laryngoscopy       DL Blade Type: MAC 3       Grade View of Cords: 2       Adjucts: stylet       Position: Right       Measured from: gums/teeth       Secured at (cm): 22       Bite block used: None    Post intubation assessment        Placement verified by: capnometry, equal breath sounds and chest rise        Number of attempts at approach: 1       Number of other approaches attempted: 0       Secured with: tape       Ease of procedure: easy       Dentition: Intact and Unchanged    Medication(s) Administered   Medication Administration Time: 7/20/2025 11:35 AM

## 2025-07-20 NOTE — BRIEF OP NOTE
[facility]    Brief Operative Note    Pre-operative diagnosis: Acalculous cholecystitis [K81.9]  Post-operative diagnosis Cholecystitis    Procedure: CHOLECYSTECTOMY, ROBOT-ASSISTED, LAPAROSCOPIC, WITH CHOLANGIOGRAM, N/A - Abdomen    Surgeon: Surgeons and Role:     * Sharmaine Swift MD - Primary     * Marjorie Calero PA-C - Assisting  Anesthesia: General   Estimated Blood Loss: 30 ml    Drains: None  Specimens:   ID Type Source Tests Collected by Time Destination   1 : GALLBLADDER AND CONTENTS Tissue Gallbladder SURGICAL PATHOLOGY EXAM Sharmaine Swift MD 7/20/2025  1:06 PM      Findings:   Dilated gallbladder with possible debris seen near sphincter of oddi on cholangiogram but patent bile duct system.  Complications: None.  Implants: * No implants in log *

## 2025-07-20 NOTE — OP NOTE
[facility] General Surgery Operative Note    Pre-operative diagnosis: acute cholecystitis   Post-operative diagnosis: same   Procedure: Robotic cholecystectomy    Surgeon: Sharmaine Swift MD   Assistant(s): Marjorie Calero PA-C  The Physician Assistant was medically necessary for their expertise in prepping, robotic instrument exchange, passing of material through port sites, closure of port sites, suturing and retraction.   Anesthesia: general   Estimated blood loss:  Specimen: 30 cc  gallbladder and contents               INDICATION FOR OPERATION: This is a 74 year old female who presented to 4 days with abdominal pain. Studies including ultrasound were consistent with dilated gallbladder with elevated transaminases and bilirubin concerning for acute cholecystitis vs choledocholithiasis. We discussed robotic assisted laparoscopic cholecystectomy with cholangiogram and the patient agreed to proceed after hearing the risks and benefits.    DESCRIPTION OF PROCEDURE:  The patient was taken to the operating room and placed on the table in supine position.  General endotracheal anesthesia was induced and the abdomen was prepped and draped in standard sterile fashion.    Access was gained in the left upper quadrant at [place] point with a 5mm 0 degree laparscopic with a visiport trocar under direct visualization.   The abdomen was insufflated with CO2.  Additional 8mm robotic ports were placed in an oblique line from right lower quadrant with even spacing to the left upper quadrant port which was replaced with an 8mm robotic port. The patient was placed in reverse Trendelenburg and right side up.  The robot was then docked.    The gallbladder was very dilated but not obvious edema or surrounding inflammation. The fundus of the gallbladder was grasped and retracted cephalad with a prograsp. The infundibulum was grasped and retracted laterally.  The peritoneum over the medial and lateral aspects of the triangle  of Calot was taken down with blunt dissection and cautery.  The cystic duct and artery were freed up from surrounding tissues.  The triangle of Calot was skeletonized revealing the critical view of safety.  Intraoperative fluorescence was used to evaluate the biliary anatomy with no additional biliary structures lighting up other than the cystic duct and common bile duct     Intraoperative cholangiogram was then completed. The cystic duct was clipped once distally. A ductotomy was made and the Todt catheter was inserted into the duct then held in place with the empty clip applier to occlude the insertion site. The duct was flushed with saline without leakage. Cholangiogram was then completed with complete filling of bilateral hepatic ducts and common bile duct with emptying into the duodenum and and small moving particles near the distal CBD.    The duct was clipped twice proximally, once distally, and the cystic artery cauterized with bipolar distally then clipped once proximally, then both were transected.  The gallbladder was then removed from the liver using the cautery.  Before the gallbladder was completely removed from the cystic plate fluorescence was again used to evaluate for any additional biliary structures and none seen. The gallbladder was passed into an Endocatch bag in the left mid abdomen port site. We observed the right upper quadrant carefully for hemostasis.  Hemostasis was assured.  The abdomen was irrigated.     The endocatch bag was removed from the abdomen, The endocatch bag was removed from the abdomen. The muscle and peritoneum of the port was closed with a running 3-0 Stratafix.    All of the incisions were closed with interrupted 4-0 Vicryl subcuticular sutures and Dermabond.  The patient tolerated the procedure well.  Sponge and instrument counts were correct.      FINDINGS: Dilated gallbladder with possible debris seen near sphincter of oddi on cholangiogram but patent bile duct system.      Sharmaine Swift MD

## 2025-07-20 NOTE — PLAN OF CARE
"Temp: 98.7  F (37.1  C) Temp src: Oral BP: 130/65 Pulse: 70   Resp: 16 SpO2: 97 % O2 Device: None (Room air)       A&Ox4. Up Independently. Tolerated regular diet for dinner, after eating pt verbalized she felt epigastric \"pressure\", pt declined needing prn pain meds. Pt also complained of feeling constipated, prn senna given. Pt took a shower/linens changed. Plan- IVF, pain control prn, zosyn every 6 hours, npo 0000- pt aware, holding Brilinta, repeat LFTs in am, GI following, general surgery following- edel planned 7/20 at 10:10am, cardiology following.     Problem: Adult Inpatient Plan of Care  Goal: Plan of Care Review  Description: The Plan of Care Review/Shift note should be completed every shift.  The Outcome Evaluation is a brief statement about your assessment that the patient is improving, declining, or no change.  This information will be displayed automatically on your shift  note.  Outcome: Progressing  Goal: Patient-Specific Goal (Individualized)  Description: You can add care plan individualizations to a care plan. Examples of Individualization might be:  \"Parent requests to be called daily at 9am for status\", \"I have a hard time hearing out of my right ear\", or \"Do not touch me to wake me up as it startles  me\".  Outcome: Progressing  Goal: Absence of Hospital-Acquired Illness or Injury  Outcome: Progressing  Intervention: Identify and Manage Fall Risk  Recent Flowsheet Documentation  Taken 7/19/2025 1725 by Krysta Oconnell RN  Safety Promotion/Fall Prevention:   safety round/check completed   nonskid shoes/slippers when out of bed  Intervention: Prevent Skin Injury  Recent Flowsheet Documentation  Taken 7/19/2025 1725 by Krysta Oconnell, RN  Body Position: position changed independently  Goal: Optimal Comfort and Wellbeing  Outcome: Progressing  Intervention: Monitor Pain and Promote Comfort  Recent Flowsheet Documentation  Taken 7/19/2025 1942 by Krysta Oconnell, RN  Pain Management Interventions: " medication offered but refused  Taken 7/19/2025 1725 by Krysta Oconnell, RN  Pain Management Interventions: medication offered but refused  Goal: Readiness for Transition of Care  Outcome: Progressing     Problem: Delirium  Goal: Optimal Coping  Outcome: Progressing  Goal: Improved Behavioral Control  Outcome: Progressing  Goal: Improved Attention and Thought Clarity  Outcome: Progressing  Goal: Improved Sleep  Outcome: Progressing

## 2025-07-21 VITALS
RESPIRATION RATE: 16 BRPM | SYSTOLIC BLOOD PRESSURE: 119 MMHG | WEIGHT: 151.8 LBS | DIASTOLIC BLOOD PRESSURE: 52 MMHG | HEIGHT: 59 IN | OXYGEN SATURATION: 96 % | TEMPERATURE: 98.1 F | BODY MASS INDEX: 30.6 KG/M2 | HEART RATE: 70 BPM

## 2025-07-21 LAB
ALBUMIN SERPL BCG-MCNC: 3.4 G/DL (ref 3.5–5.2)
ALP SERPL-CCNC: 208 U/L (ref 40–150)
ALT SERPL W P-5'-P-CCNC: 239 U/L (ref 0–50)
AMYLASE SERPL-CCNC: 55 U/L (ref 28–100)
ANION GAP SERPL CALCULATED.3IONS-SCNC: 12 MMOL/L (ref 7–15)
AST SERPL W P-5'-P-CCNC: 138 U/L (ref 0–45)
BILIRUB DIRECT SERPL-MCNC: 0.82 MG/DL (ref 0–0.3)
BILIRUB SERPL-MCNC: 1.3 MG/DL
BUN SERPL-MCNC: 13 MG/DL (ref 8–23)
CALCIUM SERPL-MCNC: 8.5 MG/DL (ref 8.8–10.4)
CHLORIDE SERPL-SCNC: 104 MMOL/L (ref 98–107)
CREAT SERPL-MCNC: 0.58 MG/DL (ref 0.51–0.95)
EGFRCR SERPLBLD CKD-EPI 2021: >90 ML/MIN/1.73M2
ERYTHROCYTE [DISTWIDTH] IN BLOOD BY AUTOMATED COUNT: 14.8 % (ref 10–15)
GLUCOSE SERPL-MCNC: 109 MG/DL (ref 70–99)
HCO3 SERPL-SCNC: 22 MMOL/L (ref 22–29)
HCT VFR BLD AUTO: 31.8 % (ref 35–47)
HGB BLD-MCNC: 10.5 G/DL (ref 11.7–15.7)
MCH RBC QN AUTO: 29.2 PG (ref 26.5–33)
MCHC RBC AUTO-ENTMCNC: 33 G/DL (ref 31.5–36.5)
MCV RBC AUTO: 88 FL (ref 78–100)
PLATELET # BLD AUTO: 227 10E3/UL (ref 150–450)
POTASSIUM SERPL-SCNC: 3.7 MMOL/L (ref 3.4–5.3)
PROT SERPL-MCNC: 5.8 G/DL (ref 6.4–8.3)
RBC # BLD AUTO: 3.6 10E6/UL (ref 3.8–5.2)
SODIUM SERPL-SCNC: 138 MMOL/L (ref 135–145)
WBC # BLD AUTO: 6.7 10E3/UL (ref 4–11)

## 2025-07-21 PROCEDURE — 99239 HOSP IP/OBS DSCHRG MGMT >30: CPT | Performed by: INTERNAL MEDICINE

## 2025-07-21 PROCEDURE — 85018 HEMOGLOBIN: CPT | Performed by: STUDENT IN AN ORGANIZED HEALTH CARE EDUCATION/TRAINING PROGRAM

## 2025-07-21 PROCEDURE — 36415 COLL VENOUS BLD VENIPUNCTURE: CPT | Performed by: INTERNAL MEDICINE

## 2025-07-21 PROCEDURE — 250N000013 HC RX MED GY IP 250 OP 250 PS 637: Performed by: STUDENT IN AN ORGANIZED HEALTH CARE EDUCATION/TRAINING PROGRAM

## 2025-07-21 PROCEDURE — 99207 PR NO CHARGE LOS: CPT | Performed by: INTERNAL MEDICINE

## 2025-07-21 PROCEDURE — 82248 BILIRUBIN DIRECT: CPT | Performed by: STUDENT IN AN ORGANIZED HEALTH CARE EDUCATION/TRAINING PROGRAM

## 2025-07-21 PROCEDURE — 82150 ASSAY OF AMYLASE: CPT | Performed by: INTERNAL MEDICINE

## 2025-07-21 PROCEDURE — 84155 ASSAY OF PROTEIN SERUM: CPT | Performed by: STUDENT IN AN ORGANIZED HEALTH CARE EDUCATION/TRAINING PROGRAM

## 2025-07-21 PROCEDURE — 250N000011 HC RX IP 250 OP 636: Performed by: STUDENT IN AN ORGANIZED HEALTH CARE EDUCATION/TRAINING PROGRAM

## 2025-07-21 RX ORDER — ONDANSETRON 4 MG/1
4 TABLET, ORALLY DISINTEGRATING ORAL EVERY 6 HOURS PRN
Qty: 10 TABLET | Refills: 0 | Status: SHIPPED | OUTPATIENT
Start: 2025-07-21

## 2025-07-21 RX ORDER — NALOXONE HYDROCHLORIDE 0.4 MG/ML
0.2 INJECTION, SOLUTION INTRAMUSCULAR; INTRAVENOUS; SUBCUTANEOUS
Status: DISCONTINUED | OUTPATIENT
Start: 2025-07-21 | End: 2025-07-21 | Stop reason: HOSPADM

## 2025-07-21 RX ORDER — NALOXONE HYDROCHLORIDE 0.4 MG/ML
0.4 INJECTION, SOLUTION INTRAMUSCULAR; INTRAVENOUS; SUBCUTANEOUS
Status: DISCONTINUED | OUTPATIENT
Start: 2025-07-21 | End: 2025-07-21 | Stop reason: HOSPADM

## 2025-07-21 RX ORDER — OXYCODONE HYDROCHLORIDE 5 MG/1
5 TABLET ORAL EVERY 4 HOURS PRN
Refills: 0 | Status: DISCONTINUED | OUTPATIENT
Start: 2025-07-21 | End: 2025-07-21 | Stop reason: HOSPADM

## 2025-07-21 RX ADMIN — PIPERACILLIN AND TAZOBACTAM 3.38 G: 3; .375 INJECTION, POWDER, FOR SOLUTION INTRAVENOUS at 04:44

## 2025-07-21 RX ADMIN — ACETAMINOPHEN 650 MG: 325 TABLET ORAL at 07:54

## 2025-07-21 RX ADMIN — METOPROLOL TARTRATE 12.5 MG: 25 TABLET, FILM COATED ORAL at 07:54

## 2025-07-21 RX ADMIN — PIPERACILLIN AND TAZOBACTAM 3.38 G: 3; .375 INJECTION, POWDER, FOR SOLUTION INTRAVENOUS at 11:10

## 2025-07-21 RX ADMIN — ASPIRIN 81 MG: 81 TABLET, DELAYED RELEASE ORAL at 07:55

## 2025-07-21 ASSESSMENT — ACTIVITIES OF DAILY LIVING (ADL)
ADLS_ACUITY_SCORE: 25

## 2025-07-21 NOTE — PROGRESS NOTES
Cardiology chart check    Primary service asked about Plavix versus Brilinta.  Reviewing the chart, patient asked to change off Brilinta secondary to cost and was given a prescription as an outpatient to start the clopidogrel instead.    She may discharge on either aspirin and Brilinta or aspirin and Plavix, follow-up with her primary cardiology team as an outpatient.    Please call with questions.

## 2025-07-21 NOTE — DISCHARGE INSTRUCTIONS
[facility]  Discharge Instructions: Post-Operative Laparoscopic Cholecystectomy    ACTIVITY  Expect to feel tired after your surgery.  This will gradually resolve.    Take frequent, short walks and increase your activity gradually.    Avoid strenuous physical activity or heavy lifting greater than 20 lbs. for 2 weeks.  You may climb stairs.  You may drive without restrictions when you are not using any prescription pain medication and feel comfortable in a car.  You may return to work/school when you are comfortable without any prescription pain medication.    WOUND CARE  You may shower 48 hours after the surgery.  Pat your incisions dry and leave them open to air.  Re-apply dressing (Band-Aids or gauze/tape) as needed for comfort or drainage.  You have surgical glue in place over your incisions.  This will stay in place for about 2 weeks, then it will start to dry and flake off.  After 2 weeks, if glue is still present, you may gently peel it off.   Do not soak your incisions in a tub or pool for 2 weeks.   Do not apply any lotions, creams, or ointments to your incisions.  A ridge under your incisions is normal and will gradually resolve.    DIET  Start with liquids, then gradually resume your regular diet as tolerated.  Avoid heavy, spicy, and greasy meals for 2-3 days.  Drink plenty of fluids to stay hydrated.  It is not uncommon to experience some loose stools or diarrhea after surgery.  This is your body s way of adapting to the bile which will slowly drain into your intestine.  A low fat diet may help with this.  This should improve over 1-2 months.    PAIN  Expect some tenderness and discomfort at the incision sites.  Use the prescribed pain medication at your discretion.  Expect gradual resolution of your pain over several days.  You may take ibuprofen with food (unless you have been told not to) instead of or in addition to your prescribed pain medication.   Do not drink  alcohol or drive while you are taking pain medications.  You may apply ice to your incisions in 20 minute intervals as needed for the next 48 hours.  After that time, consider switching to heat if you prefer.    EXPECTATIONS  Pain medications can cause constipation.  Limit use when possible.  Take over the counter stool softener/stimulant, such as Colace or Senna, 1-2 times a day with plenty of water.  You may take a mild over the counter laxative, such as Miralax or a suppository, as needed.  You may discontinue these medications once you are having regular bowel movements and/or are no longer taking your narcotic pain medication.    You may have shoulder or upper back discomfort due to the gas used in surgery.  This is temporary and should resolve in 48-72 hours.  Short, frequent walks may help with this.    FOLLOW UP  Our office will contact you approximately 2-3 weeks to check on your progress and answer any questions you may have.  If you are doing well, you will not need to return for a follow up appointment.  If any concerns are identified over the phone, we will help you make an appointment to see a provider.   If you have not received a phone call, have any questions or concerns, or would like to be seen, please call us at [phone] and ask to speak with our nurse.  We are located at [address], [address], [place] [address]     CALL OUR OFFICE -[phone] IF YOU HAVE:   Chills or fever above 101 F.  Increased redness, warmth, or drainage at your incisions.  Significant bleeding.  Pain not relieved by your pain medication or rest.  Increasing pain after the first 48 hours.  Any other concerns or questions.    Revised January 2018

## 2025-07-21 NOTE — PROGRESS NOTES
BRIEF GASTROENTEROLOGY NOTE     Patient s/p laparoscopic cholecystectomy on 7/20 with IOC showing possible debris near sphincter of oddi. Today patient's labs are trending down. Discussed with biliary provider and no need for ERCP. Okay to advance diet as tolerated. GI will sign off. Please contact us with any questions or concerns.     Sivan Ferrell PA-C  [facility] ([facility])

## 2025-07-21 NOTE — PLAN OF CARE
"Patient's After Visit Summary was reviewed with patient and/or family.   Patient verbalized understanding of After Visit Summary, recommended follow up and was given an opportunity to ask questions.   Discharge medications sent home with patient/family: YES, amoxacillin, and Zofran   Discharged with son      OBSERVATION patient END time: 1310    Goal Outcome Evaluation:      Plan of Care Reviewed With: patient    Overall Patient Progress: improvingOverall Patient Progress: improving    Outcome Evaluation: A&Ox4, pt clear to Discharge      Problem: Adult Inpatient Plan of Care  Goal: Plan of Care Review  Description: The Plan of Care Review/Shift note should be completed every shift.  The Outcome Evaluation is a brief statement about your assessment that the patient is improving, declining, or no change.  This information will be displayed automatically on your shift  note.  7/21/2025 1314 by Marizol Decker RN  Outcome: Met  Flowsheets (Taken 7/21/2025 1314)  Outcome Evaluation: A&Ox4, pt clear to Discharge  Plan of Care Reviewed With: patient  Overall Patient Progress: improving  7/21/2025 1311 by Marizol Decker RN  Outcome: Progressing  Flowsheets (Taken 7/21/2025 1311)  Outcome Evaluation: A&Ox4, pain controlled with tylenol, Independent  Plan of Care Reviewed With: patient  Overall Patient Progress: improving  7/21/2025 1311 by Marizol Decker RN  Reactivated  Flowsheets (Taken 7/21/2025 1311)  Outcome Evaluation: A&Ox4, pain controlled with tylenol, Independent  Plan of Care Reviewed With: patient  Overall Patient Progress: improving  Goal: Patient-Specific Goal (Individualized)  Description: You can add care plan individualizations to a care plan. Examples of Individualization might be:  \"Parent requests to be called daily at 9am for status\", \"I have a hard time hearing out of my right ear\", or \"Do not touch me to wake me up as it startles  me\".  7/21/2025 1314 by Marizol Decker RN  Outcome: " Met  7/21/2025 1311 by Marizol Decker RN  Outcome: Progressing  7/21/2025 1311 by Marizol Decker RN  Reactivated  Goal: Absence of Hospital-Acquired Illness or Injury  7/21/2025 1314 by Marizol Decker RN  Outcome: Met  7/21/2025 1311 by Marizol Decker RN  Outcome: Progressing  7/21/2025 1311 by Marizol Decker RN  Reactivated  Intervention: Identify and Manage Fall Risk  Recent Flowsheet Documentation  Taken 7/21/2025 0938 by Marizol Decker RN  Safety Promotion/Fall Prevention: safety round/check completed  Intervention: Prevent and Manage VTE (Venous Thromboembolism) Risk  Recent Flowsheet Documentation  Taken 7/21/2025 0749 by Marizol Decker RN  VTE Prevention/Management: SCDs on (sequential compression devices)  Intervention: Prevent Infection  Recent Flowsheet Documentation  Taken 7/21/2025 0938 by Marizol Decker RN  Infection Prevention: single patient room provided  Taken 7/21/2025 0749 by Marizol Decker RN  Infection Prevention: single patient room provided  Goal: Optimal Comfort and Wellbeing  7/21/2025 1314 by Marizol Decker RN  Outcome: Met  7/21/2025 1311 by Marizol Decker RN  Outcome: Progressing  7/21/2025 1311 by Marizol Decker RN  Reactivated  Intervention: Monitor Pain and Promote Comfort  Recent Flowsheet Documentation  Taken 7/21/2025 0749 by Marizol Decker RN  Pain Management Interventions: medication (see MAR)  Goal: Readiness for Transition of Care  7/21/2025 1314 by Marizol Decker RN  Outcome: Met  7/21/2025 1311 by Marizol Decker RN  Outcome: Progressing  7/21/2025 1311 by Marizol Decker RN  Reactivated

## 2025-07-21 NOTE — DISCHARGE SUMMARY
"[facility]  Hospitalist Discharge Summary      Date of Admission:  7/18/2025  Date of Discharge:  7/21/2025  Discharging Provider: Patrice Johnson MD  Discharge Service: Hospitalist Service    Discharge Diagnoses   Acute cholecystitis  ?  Debris seen near sphincter of Oddi on IOC intraoperative cholangiogram  Coronary artery disease:  Severe aortic stenosis for TAVR  Hypercholesteremia        Clinically Significant Risk Factors     # Obesity: Estimated body mass index is 31.19 kg/m  as calculated from the following:    Height as of this encounter: 1.486 m (4' 10.5\").    Weight as of this encounter: 68.9 kg (151 lb 12.8 oz).       Follow-ups Needed After Discharge   Follow-up Appointments       Hospital Follow-up with Existing Primary Care Provider (PCP)          Schedule Primary Care visit within: 7 Days   Recommended labs and Imaging (to be ordered by Primary Care Provider): CMP               Unresulted Labs Ordered in the Past 30 Days of this Admission       Date and Time Order Name Status Description    7/20/2025  1:06 PM Surgical Pathology Exam In process     7/18/2025 10:57 AM BLOOD CULTURE Preliminary     7/18/2025 10:57 AM BLOOD CULTURE Preliminary         These results will be followed up by Maxine Bonner      Discharge Disposition   Discharged to home  Condition at discharge: Good    Hospital Course   Pamela Engle is a 74 year old lady with severe aortic stenosis being worked up for TAVR and in the process of workup underwent coronary angiogram which showed a single-vessel RCA disease which was stented and started on aspirin and Brilinta 5/28/2025, came into [facility] 7/18/2025 with 4-day history of chest discomfort, malaise, flulike symptoms, rigors and chills with a temperature of 101.2  F and symptoms getting worse her AST was 194  alkaline phosphatase 236 total bilirubin 2 and direct bilirubin 1.6 ultrasound of the abdomen showed distention of gallbladder and " CT scan showed indications of cholecystitis MRCP done 7/18/2025 showed distended gallbladder with normal intra and extrahepatic biliary ducts without any intraductal stones or strictures.  Aspirin was withheld with the plan to restart it as soon as it is possible by cardiology.  On 7/20/2025 patient underwent robot-assisted laparoscopic cholecystectomy with intraoperative cholangiogram by Dr. Chasidy Swift.  There was possible debris seen near sphincter of Oddi on cholangiogram I discussed with gastroenterology and with LFTs decreasing GI thinks that she can go home and follow-up on the LFTs.  Surgery is going to follow-up with the patient as outpatient.  She should check a CMP before seeing her primary care physician within 1 week.     Consultations This Hospital Stay   SURGERY GENERAL IP CONSULT  GASTROENTEROLOGY IP CONSULT  CARDIOLOGY IP CONSULT    Code Status   Full Code    Time Spent on this Encounter   I, Patrice Johnson MD, personally saw the patient today and spent greater than 30 minutes discharging this patient.       Patrice Johnson MD  [facility] OBSERVATION DEPT  [address] [place] [address]  Phone: [phone]  ______________________________________________________________________    Physical Exam   Vital Signs: Temp: 98.1  F (36.7  C) Temp src: Oral BP: 119/52 Pulse: 70   Resp: 16 SpO2: 96 % O2 Device: None (Room air) Oxygen Delivery: 1 LPM  Weight: 151 lbs 12.8 oz    GENERAL: Patient is not in acute distress  HEENT: EOM+,Conjunctiva is clear   NECK:  no Jugular Venous distention  HEART: S1 S2 regular Rate and Rhythm, there is ejection systolic murmur,   LUNGS: Respirations are  not laboured, Lungs are  clear to auscultation without Crepitations or Wheezing   ABDOMEN: Soft, there is minimal tenderness, Bowel Sounds are Positive   CNS:  Alert,  Oriented x 3, Moving all the Four Limbs        Primary Care Physician   Maxine Bonner    Discharge Orders      Reason for your hospital  stay    nick into [facility] 7/18/2025 with 4-day history of chest discomfort, malaise, flulike symptoms, rigors and chills with a temperature of 101.2  F and symptoms getting worse her AST was 194  alkaline phosphatase 236 total bilirubin 2     Activity    Your activity upon discharge: activity as tolerated  No lifting of weights more than 5 pounds for at least a month     Diet    Follow this diet upon discharge: Current Diet:Orders Placed This Encounter      Low Saturated Fat Na <2400 mg     Hospital Follow-up with Existing Primary Care Provider (PCP)            Significant Results and Procedures   Most Recent 3 CBC's:  Recent Labs   Lab Test 07/21/25  0439 07/20/25  0612 07/19/25  0441   WBC 6.7 4.1 4.3   HGB 10.5* 11.6* 11.6*   MCV 88 88 87    229 212     Most Recent 3 BMP's:  Recent Labs   Lab Test 07/21/25  0439 07/20/25  0926 07/20/25  0612 07/19/25  0441     --  141 137   POTASSIUM 3.7  --  4.1 3.5   CHLORIDE 104  --  108* 104   CO2 22  --  20* 20*   BUN 13.0  --  8.9 11.9   CR 0.58  --  0.58 0.62   ANIONGAP 12  --  13 13   HUGO 8.5*  --  8.9 8.9   * 97 103* 85     Most Recent 2 LFT's:  Recent Labs   Lab Test 07/21/25  0439 07/20/25  0612   * 178*   * 339*   ALKPHOS 208* 240*   BILITOTAL 1.3* 2.4*     Most Recent 3 INR's:  Recent Labs   Lab Test 07/18/25  1121   INR 0.90   ,   Results for orders placed or performed during the hospital encounter of 07/18/25   US Abdomen Limited (RUQ)    Narrative    EXAM: US ABDOMEN LIMITED  LOCATION: [facility]  DATE: 7/18/2025    INDICATION: abdominal pain elevated lfts  COMPARISON: CT abdomen pelvis 7/18/2025  TECHNIQUE: Limited abdominal ultrasound.    FINDINGS:    GALLBLADDER: Distended. No gallstones, wall thickening, or pericholecystic fluid. Negative sonographic Gutierrez's sign.    BILE DUCTS: No biliary dilatation. The common duct measures 3 mm.    LIVER: Normal parenchyma with smooth contour.  No focal mass. The portal vein is patent with flow in the normal direction.    RIGHT KIDNEY: No hydronephrosis.    PANCREAS: The visualized portions are normal.    No ascites.      Impression    IMPRESSION:  1. Distended but otherwise unremarkable gallbladder.       MR Abdomen MRCP without Contrast    Narrative    EXAM: MR ABDOMEN MRCP W/O CONTRAST  LOCATION: [facility]  DATE: 7/18/2025    INDICATION: Epigastric pain.  Elevated LFTs.  COMPARISON: 7/18/2025  TECHNIQUE: Routine MR liver/pancreas protocol including axial and coronal MRCP sequences. 2D and 3D reconstruction performed by MR technologist including MIP reconstruction and slab cholangiograms. If performed with contrast, additional dynamic T1 post   IV contrast images.  CONTRAST: None     FINDINGS:     MRCP: Normal intra and extrahepatic bile ducts. No dilatation, stricture, or intra ductal stones. No masses. Normal pancreatic duct with normal duct anatomy. Distended but otherwise unremarkable gallbladder.    LIVER: No significant hepatic abnormality. No focal masses. No evidence of cirrhosis or significant fat or iron deposition.    PANCREAS: No evidence of mass or pancreatitis.    ADDITIONAL FINDINGS: No significant abnormality in the spleen, kidneys, and adrenal glands. No adenopathy. No ascites.      Impression    IMPRESSION:  1.  Distended gallbladder. Otherwise unremarkable MRCP.   XR Surgery TOMER Fluoro Less Than 5 Min w Stills    Narrative    This exam was marked as non-reportable because it will not be read by a   radiologist or a Mathias non-radiologist provider.             Discharge Medications      Review of your medicines        START taking        Dose / Directions   amoxicillin-clavulanate 875-125 MG tablet  Commonly known as: AUGMENTIN  Used for: Acalculous cholecystitis      Dose: 1 tablet  Take 1 tablet by mouth 2 times daily for 2 days.  Quantity: 4 tablet  Refills: 0     ondansetron 4 MG ODT tab  Commonly known  as: ZOFRAN ODT  Used for: Acalculous cholecystitis      Dose: 4 mg  Take 1 tablet (4 mg) by mouth every 6 hours as needed for nausea or vomiting.  Quantity: 10 tablet  Refills: 0            CONTINUE these medicines which have NOT CHANGED        Dose / Directions   acetaminophen 500 MG tablet  Commonly known as: TYLENOL      Dose: 1,000 mg  Take 1,000 mg by mouth every 8 hours as needed for mild pain.  Refills: 0     alendronate 70 MG tablet  Commonly known as: FOSAMAX  Used for: Localized osteoporosis without current pathological fracture      TAKE 1 TABLET (70 MG TOTAL) BY MOUTH EVERY 7 DAYS.  Quantity: 12 tablet  Refills: 3     aspirin 81 MG EC tablet  Used for: Nonrheumatic aortic valve stenosis, Pure hypercholesterolemia      Dose: 81 mg  Take 1 tablet (81 mg) by mouth daily. Start tomorrow.  Quantity: 30 tablet  Refills: 3     atorvastatin 80 MG tablet  Commonly known as: Lipitor  Used for: Mixed hyperlipidemia      Dose: 80 mg  Take 1 tablet (80 mg) by mouth daily.  Quantity: 90 tablet  Refills: 3     HUGO-MAG-ZINC OR      Dose: 500 mg  Take 500 mg by mouth 2 times daily  Refills: 0     clopidogrel 75 MG tablet  Commonly known as: PLAVIX  Used for: S/P drug eluting coronary stent placement, Pure hypercholesterolemia      Finish Brilinta.Then start, Clopidogrel 600 mg (8 tabs) by mouth once. Next morning, Clopidogrel 75 mg by mouth once daily.  Quantity: 90 tablet  Refills: 1     Hair Skin and Nails Formula Tabs      Dose: 1 tablet  Take 1 tablet by mouth daily.  Refills: 0     metoprolol tartrate 25 MG tablet  Commonly known as: LOPRESSOR  Used for: Nonrheumatic aortic valve stenosis, Pure hypercholesterolemia      Dose: 12.5 mg  Take 0.5 tablets (12.5 mg) by mouth 2 times daily.  Quantity: 30 tablet  Refills: 12     MULTIVITAMIN PO      Dose: 1 tablet  Take 1 tablet by mouth daily.  Refills: 0     valACYclovir 1000 mg tablet  Commonly known as: VALTREX  Used for: HSV (herpes simplex virus) infection      TAKE  TWO TABLETS BY MOUTH TWICE A DAY FOR ONE DAY AS NEEDED FOR COLD SORES  Quantity: 20 tablet  Refills: 1     vitamin D3 50 MCG (2000 UT) Caps      Dose: 2,000 Units  Take 2,000 Units by mouth every other day.  Refills: 0            STOP taking      meloxicam 7.5 MG tablet  Commonly known as: MOBIC        ticagrelor 90 MG tablet  Commonly known as: BRILINTA                  Where to get your medicines        These medications were sent to [facility] [place] - [address] [facility]  [address] [facility] [address]      Phone: [phone]   amoxicillin-clavulanate 875-125 MG tablet  ondansetron 4 MG ODT tab       Allergies   Allergies   Allergen Reactions    Hydrocodone Swelling

## 2025-07-21 NOTE — PLAN OF CARE
INPATIENT NOTE: CARES PROVIDED FROM [id number]  Diagnosis:  Cholecystectomy   Vital signs:  Temp: 98.1  F (36.7  C) Temp src: Oral BP: 119/52 Pulse: 70   Resp: 16 SpO2: 96 % O2 Device: None (Room air)         Labs: ,   Cardiac: WKL, pt has a murmur, pt had a low HR when coming back from surgery and I let the provider aware. HR ended up going back up to normal range.   Resp: WKL, pt denies SOB, lightheaded, and dizziness.   Neuro: A&Ox4  GI:A&Ax4, pt had a BM this morning and feels like another BM might happened tonight   :WKL  Skin:5 incisions that are closed with glue and open to air. No drainage at sites.   Activity:independent   Diet: low fat diet   Pain: pain controlled with tylenol   Lines: IV removed.     Plan: pt cleared to discharge from GI, surgery, Cardiology.       Goal Outcome Evaluation:      Plan of Care Reviewed With: patient    Overall Patient Progress: improvingOverall Patient Progress: improving    Outcome Evaluation: A&Ox4, pain controlled with tylenol, Independent      Problem: Adult Inpatient Plan of Care  Goal: Plan of Care Review  Description: The Plan of Care Review/Shift note should be completed every shift.  The Outcome Evaluation is a brief statement about your assessment that the patient is improving, declining, or no change.  This information will be displayed automatically on your shift  note.  7/21/2025 1311 by Marizol Decker RN  Outcome: Progressing  Flowsheets (Taken 7/21/2025 1311)  Outcome Evaluation: A&Ox4, pain controlled with tylenol, Independent  Plan of Care Reviewed With: patient  Overall Patient Progress: improving  7/21/2025 1311 by Marizol Decker RN  Reactivated  Flowsheets (Taken 7/21/2025 1311)  Outcome Evaluation: A&Ox4, pain controlled with tylenol, Independent  Plan of Care Reviewed With: patient  Overall Patient Progress: improving  Goal: Patient-Specific Goal (Individualized)  Description: You can add care plan individualizations to a care plan.  "Examples of Individualization might be:  \"Parent requests to be called daily at 9am for status\", \"I have a hard time hearing out of my right ear\", or \"Do not touch me to wake me up as it startles  me\".  7/21/2025 1311 by Marizlo Decker RN  Outcome: Progressing  7/21/2025 1311 by Marizol Decker RN  Reactivated  Goal: Absence of Hospital-Acquired Illness or Injury  7/21/2025 1311 by Marizol Decker RN  Outcome: Progressing  7/21/2025 1311 by Marizol Decker RN  Reactivated  Intervention: Identify and Manage Fall Risk  Recent Flowsheet Documentation  Taken 7/21/2025 0938 by Marizol Decker RN  Safety Promotion/Fall Prevention: safety round/check completed  Intervention: Prevent and Manage VTE (Venous Thromboembolism) Risk  Recent Flowsheet Documentation  Taken 7/21/2025 0749 by Marizol Decker RN  VTE Prevention/Management: SCDs on (sequential compression devices)  Intervention: Prevent Infection  Recent Flowsheet Documentation  Taken 7/21/2025 0938 by Marizol Decker RN  Infection Prevention: single patient room provided  Taken 7/21/2025 0749 by Marizol Decker RN  Infection Prevention: single patient room provided  Goal: Optimal Comfort and Wellbeing  7/21/2025 1311 by Marizol Decker RN  Outcome: Progressing  7/21/2025 1311 by Marizol Decker RN  Reactivated  Intervention: Monitor Pain and Promote Comfort  Recent Flowsheet Documentation  Taken 7/21/2025 0749 by Marizol Decker RN  Pain Management Interventions: medication (see MAR)  Goal: Readiness for Transition of Care  7/21/2025 1311 by Marizol Decker RN  Outcome: Progressing  7/21/2025 1311 by Marizol Decker RN  Reactivated           "

## 2025-07-21 NOTE — PROGRESS NOTES
[facility]    General Surgery Progress Note          Assessment and Plan:   Assessment:    Pamela Engle is a 74 year old female 1 Day Post-Op from Procedure(s):  CHOLECYSTECTOMY, ROBOT-ASSISTED, LAPAROSCOPIC, WITH CHOLANGIOGRAM   - cholangiogram showed patent bile duct system  - LFTs down trending        Plan:   Pain mgmt: Oxycodone, Tylenol  Diet: low fat  IVFs: saline lock  Activity: as tolerated  Dispo: Ok to discharge home from surgical perspective, wait to see GI recs  Surgical PA will do follow up via phone call in 2-3 weeks            Interval History:   Pt resting in bed.  Son present.  Feeling good, wants to go home.  Tolerating diet with no nausea.  Not passing gas yet. Ambulating in halls on own. Discharge instructions discussed.         Physical Exam:   Temp: 98.1  F (36.7  C) Temp src: Oral BP: 119/52 Pulse: 70   Resp: 16   SpO2: 96 % O2 Device: None (Room air) Oxygen Delivery: 1 LPM    I/O last 3 completed shifts:  In: 2250 [P.O.:250; I.V.:2000]  Out: -     Constitutional: alert and no distress   Abdomen: soft, non tender.   Incisions: surgical glue in place - clean/dry/intact             Data:   Data   Recent Labs   Lab 07/21/25  0439 07/20/25  0926 07/20/25  0612 07/19/25  0441 07/18/25  1121   WBC 6.7  --  4.1 4.3 7.2   HGB 10.5*  --  11.6* 11.6* 13.9   MCV 88  --  88 87 89     --  229 212 254   INR  --   --   --   --  0.90     --  141 137 139   POTASSIUM 3.7  --  4.1 3.5 3.9   CHLORIDE 104  --  108* 104 102   CO2 22  --  20* 20* 23   BUN 13.0  --  8.9 11.9 13.5   CR 0.58  --  0.58 0.62 0.70   ANIONGAP 12  --  13 13 14   HUGO 8.5*  --  8.9 8.9 9.5   * 97 103* 85 111*   ALBUMIN 3.4*  --  3.7 3.8 4.6   PROTTOTAL 5.8*  --  6.4 6.3* 7.4   BILITOTAL 1.3*  --  2.4* 3.0* 2.0*   ALKPHOS 208*  --  240* 210* 236*   *  --  339* 368* 380*   *  --  178* 222* 194*        Marjorie Calero PA-C  [facility]  Text page: [phone]   8-4 pm   After 4 pm call [phone]

## 2025-07-21 NOTE — PLAN OF CARE
"[id number]    Inpatient Progress Note:    BP (!) 141/61   Pulse 54   Temp 97.6  F (36.4  C) (Oral)   Resp 16   Ht 1.486 m (4' 10.5\")   Wt 68.9 kg (151 lb 12.8 oz)   SpO2 98%   BMI 31.19 kg/m         Orientation: A&Ox4  Pain status: Denies pain  Activity: Ind in room   Resp: WDL  Cardiac: WDL  GI: WDL, incision sites- clean and intact  : WDL  Skin: X, 5 lap site incisions glue/open to air  LDA: PIV - New IV placed   Infusions: Sl- IV Zosyn q6hrs  Pertinent Labs: LFT elevated- redraw labs in am  Diet: Low fat  Consults: Cardiology/Surgery/GI  Discharge Plan: Home    Will continue to monitor and provide cares.     Karina Valle RN       Problem: Adult Inpatient Plan of Care  Goal: Plan of Care Review  Description: The Plan of Care Review/Shift note should be completed every shift.  The Outcome Evaluation is a brief statement about your assessment that the patient is improving, declining, or no change.  This information will be displayed automatically on your shift  note.  Outcome: Progressing  Flowsheets (Taken 7/21/2025 0025)  Outcome Evaluation: A&Ox4, Ind in room. New IV placed. Zosyn q6hrs. Redraw LFT labs in am  Plan of Care Reviewed With: patient  Overall Patient Progress: improving  Goal: Patient-Specific Goal (Individualized)  Description: You can add care plan individualizations to a care plan. Examples of Individualization might be:  \"Parent requests to be called daily at 9am for status\", \"I have a hard time hearing out of my right ear\", or \"Do not touch me to wake me up as it startles  me\".  Outcome: Progressing  Goal: Absence of Hospital-Acquired Illness or Injury  Outcome: Progressing  Intervention: Identify and Manage Fall Risk  Recent Flowsheet Documentation  Taken 7/20/2025 2034 by Karina Valle RN  Safety Promotion/Fall Prevention: safety round/check completed  Intervention: Prevent and Manage VTE (Venous Thromboembolism) Risk  Recent Flowsheet Documentation  Taken 7/20/2025 2034 by " Karina Valle RN  VTE Prevention/Management: SCDs on (sequential compression devices)  Intervention: Prevent Infection  Recent Flowsheet Documentation  Taken 7/20/2025 2034 by Karina Valle RN  Infection Prevention: single patient room provided  Goal: Optimal Comfort and Wellbeing  Outcome: Progressing  Goal: Readiness for Transition of Care  Outcome: Progressing     Problem: Delirium  Goal: Optimal Coping  Outcome: Progressing  Goal: Improved Behavioral Control  Outcome: Progressing  Intervention: Minimize Safety Risk  Recent Flowsheet Documentation  Taken 7/20/2025 2034 by Karina Valle RN  Enhanced Safety Measures: family to remain at bedside  Goal: Improved Attention and Thought Clarity  Outcome: Progressing  Goal: Improved Sleep  Outcome: Progressing   Goal Outcome Evaluation:      Plan of Care Reviewed With: patient    Overall Patient Progress: improvingOverall Patient Progress: improving    Outcome Evaluation: A&Ox4, Ind in room. New IV placed. Zosyn q6hrs. Redraw LFT labs in am

## 2025-07-22 LAB
PATH REPORT.COMMENTS IMP SPEC: NORMAL
PATH REPORT.COMMENTS IMP SPEC: NORMAL
PATH REPORT.FINAL DX SPEC: NORMAL
PATH REPORT.GROSS SPEC: NORMAL
PATH REPORT.MICROSCOPIC SPEC OTHER STN: NORMAL
PATH REPORT.RELEVANT HX SPEC: NORMAL
PHOTO IMAGE: NORMAL

## 2025-07-23 ENCOUNTER — PATIENT OUTREACH (OUTPATIENT)
Dept: CARE COORDINATION | Facility: CLINIC | Age: 75
End: 2025-07-23
Payer: COMMERCIAL

## 2025-07-23 NOTE — PROGRESS NOTES
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 (Red Flag symptoms escalate to triage hotline per guidelines): Improved  Do you know how to contact your clinic care team if you have future questions or changes to your health status? : Yes  Does the patient have their discharge instructions? : Yes  Does the patient have questions regarding their discharge instructions? : No  Were you started on any new medications or were there changes to any of your previous medications? : Yes  Does the patient have all of their medications?: Yes  Do you have questions regarding any of your medications? : No  Do you have all of your needed medical supplies or equipment (DME)?  (i.e. oxygen tank, CPAP, cane, etc.): Yes    Post-op ([facility] CTA Only)  If the patient had a surgery or procedure, do they have any questions for a nurse?: No         [facility] Explained and offered Care Coordination support to eligible patients: Yes    Patient accepted? No    Follow up Plan     Discharge Follow-Up  Discharge follow up appointment scheduled in alignment with recommended follow up timeframe or Transitions of Risk Category? (Low = within 30 days; Moderate= within 14 days; High= within 7 days): Yes  Discharge Follow Up Appointment Date: 08/04/25  Discharge " Follow Up Appointment Scheduled with?: Specialty Care Provider    Future Appointments   Date Time Provider Department Center   8/4/2025  1:45 PM JN HC ECHO STAFF [facility]   8/7/2025 12:30 PM [facility]   8/7/2025 12:50 PM [facility]   8/7/2025  1:10 PM EVY HCC VALVE RN [facility]       Outpatient Plan as outlined on AVS reviewed with patient.    For any urgent concerns, please contact our 24 hour nurse triage line: [phone] ([phone])       Maura Dueñas MA

## 2025-07-25 ENCOUNTER — NURSE TRIAGE (OUTPATIENT)
Dept: CARDIOLOGY | Facility: CLINIC | Age: 75
End: 2025-07-25

## 2025-07-25 ENCOUNTER — APPOINTMENT (OUTPATIENT)
Dept: GENERAL RADIOLOGY | Facility: CLINIC | Age: 75
End: 2025-07-25
Attending: EMERGENCY MEDICINE
Payer: COMMERCIAL

## 2025-07-25 ENCOUNTER — HOSPITAL ENCOUNTER (INPATIENT)
Facility: CLINIC | Age: 75
LOS: 4 days | Discharge: HOME | End: 2025-07-29
Attending: EMERGENCY MEDICINE | Admitting: INTERNAL MEDICINE
Payer: COMMERCIAL

## 2025-07-25 DIAGNOSIS — R10.13 EPIGASTRIC PAIN: ICD-10-CM

## 2025-07-25 DIAGNOSIS — I35.0 AORTIC VALVE STENOSIS: ICD-10-CM

## 2025-07-25 DIAGNOSIS — K92.1 GASTROINTESTINAL HEMORRHAGE WITH MELENA: ICD-10-CM

## 2025-07-25 DIAGNOSIS — K92.2 UPPER GI BLEED: Primary | ICD-10-CM

## 2025-07-25 DIAGNOSIS — D62 ANEMIA DUE TO BLOOD LOSS, ACUTE: ICD-10-CM

## 2025-07-25 LAB
ABO + RH BLD: NORMAL
ALBUMIN SERPL BCG-MCNC: 4.2 G/DL (ref 3.5–5.2)
ALP SERPL-CCNC: 238 U/L (ref 40–150)
ALT SERPL W P-5'-P-CCNC: 168 U/L (ref 0–50)
ANION GAP SERPL CALCULATED.3IONS-SCNC: 14 MMOL/L (ref 7–15)
AST SERPL W P-5'-P-CCNC: 94 U/L (ref 0–45)
ATRIAL RATE - MUSE: 90 BPM
BASOPHILS # BLD AUTO: 0.1 10E3/UL (ref 0–0.2)
BASOPHILS NFR BLD AUTO: 1 %
BILIRUB SERPL-MCNC: 0.8 MG/DL
BLD GP AB SCN SERPL QL: NEGATIVE
BUN SERPL-MCNC: 40.6 MG/DL (ref 8–23)
CALCIUM SERPL-MCNC: 9.2 MG/DL (ref 8.8–10.4)
CHLORIDE SERPL-SCNC: 101 MMOL/L (ref 98–107)
CREAT SERPL-MCNC: 0.6 MG/DL (ref 0.51–0.95)
DIASTOLIC BLOOD PRESSURE - MUSE: NORMAL MMHG
EGFRCR SERPLBLD CKD-EPI 2021: >90 ML/MIN/1.73M2
EOSINOPHIL # BLD AUTO: 0.3 10E3/UL (ref 0–0.7)
EOSINOPHIL NFR BLD AUTO: 2 %
ERYTHROCYTE [DISTWIDTH] IN BLOOD BY AUTOMATED COUNT: 15.6 % (ref 10–15)
GLUCOSE SERPL-MCNC: 137 MG/DL (ref 70–99)
HCO3 SERPL-SCNC: 20 MMOL/L (ref 22–29)
HCT VFR BLD AUTO: 26.4 % (ref 35–47)
HEMOCCULT STL QL: POSITIVE
HGB BLD-MCNC: 7.7 G/DL (ref 11.7–15.7)
HGB BLD-MCNC: 9 G/DL (ref 11.7–15.7)
IMM GRANULOCYTES # BLD: 0.4 10E3/UL
IMM GRANULOCYTES NFR BLD: 3 %
INR PPP: 1 (ref 0.85–1.15)
INTERPRETATION ECG - MUSE: NORMAL
LIPASE SERPL-CCNC: 27 U/L (ref 13–60)
LYMPHOCYTES # BLD AUTO: 2.2 10E3/UL (ref 0.8–5.3)
LYMPHOCYTES NFR BLD AUTO: 15 %
MCH RBC QN AUTO: 29.7 PG (ref 26.5–33)
MCHC RBC AUTO-ENTMCNC: 34.1 G/DL (ref 31.5–36.5)
MCV RBC AUTO: 87 FL (ref 78–100)
MCV RBC AUTO: 90 FL (ref 78–100)
MONOCYTES # BLD AUTO: 0.9 10E3/UL (ref 0–1.3)
MONOCYTES NFR BLD AUTO: 6 %
NEUTROPHILS # BLD AUTO: 10.9 10E3/UL (ref 1.6–8.3)
NEUTROPHILS NFR BLD AUTO: 74 %
NRBC # BLD AUTO: 0 10E3/UL
NRBC BLD AUTO-RTO: 0 /100
P AXIS - MUSE: 49 DEGREES
PLATELET # BLD AUTO: 527 10E3/UL (ref 150–450)
POTASSIUM SERPL-SCNC: 4.7 MMOL/L (ref 3.4–5.3)
PR INTERVAL - MUSE: 128 MS
PROT SERPL-MCNC: 7.1 G/DL (ref 6.4–8.3)
PROTHROMBIN TIME: 13.3 SECONDS (ref 11.8–14.8)
QRS DURATION - MUSE: 84 MS
QT - MUSE: 372 MS
QTC - MUSE: 455 MS
R AXIS - MUSE: 29 DEGREES
RBC # BLD AUTO: 3.03 10E6/UL (ref 3.8–5.2)
SODIUM SERPL-SCNC: 135 MMOL/L (ref 135–145)
SPECIMEN EXP DATE BLD: NORMAL
SYSTOLIC BLOOD PRESSURE - MUSE: NORMAL MMHG
T AXIS - MUSE: 93 DEGREES
TROPONIN T SERPL HS-MCNC: 6 NG/L
TROPONIN T SERPL HS-MCNC: 8 NG/L
UPPER GI ENDOSCOPY: NORMAL
VENTRICULAR RATE- MUSE: 90 BPM
WBC # BLD AUTO: 14.8 10E3/UL (ref 4–11)

## 2025-07-25 PROCEDURE — 250N000011 HC RX IP 250 OP 636: Performed by: EMERGENCY MEDICINE

## 2025-07-25 PROCEDURE — 86923 COMPATIBILITY TEST ELECTRIC: CPT | Performed by: INTERNAL MEDICINE

## 2025-07-25 PROCEDURE — 93005 ELECTROCARDIOGRAM TRACING: CPT

## 2025-07-25 PROCEDURE — 80053 COMPREHEN METABOLIC PANEL: CPT | Performed by: EMERGENCY MEDICINE

## 2025-07-25 PROCEDURE — 85025 COMPLETE CBC W/AUTO DIFF WBC: CPT | Performed by: EMERGENCY MEDICINE

## 2025-07-25 PROCEDURE — 710N000012 HC RECOVERY PHASE 2, PER MINUTE: Performed by: INTERNAL MEDICINE

## 2025-07-25 PROCEDURE — 83690 ASSAY OF LIPASE: CPT | Performed by: EMERGENCY MEDICINE

## 2025-07-25 PROCEDURE — 0DJ08ZZ INSPECTION OF UPPER INTESTINAL TRACT, VIA NATURAL OR ARTIFICIAL OPENING ENDOSCOPIC: ICD-10-PCS | Performed by: INTERNAL MEDICINE

## 2025-07-25 PROCEDURE — 272N000001 HC OR GENERAL SUPPLY STERILE: Performed by: INTERNAL MEDICINE

## 2025-07-25 PROCEDURE — 258N000003 HC RX IP 258 OP 636: Performed by: EMERGENCY MEDICINE

## 2025-07-25 PROCEDURE — 99222 1ST HOSP IP/OBS MODERATE 55: CPT | Performed by: INTERNAL MEDICINE

## 2025-07-25 PROCEDURE — 370N000017 HC ANESTHESIA TECHNICAL FEE, PER MIN: Performed by: INTERNAL MEDICINE

## 2025-07-25 PROCEDURE — 250N000013 HC RX MED GY IP 250 OP 250 PS 637: Performed by: INTERNAL MEDICINE

## 2025-07-25 PROCEDURE — 82272 OCCULT BLD FECES 1-3 TESTS: CPT | Performed by: EMERGENCY MEDICINE

## 2025-07-25 PROCEDURE — 36415 COLL VENOUS BLD VENIPUNCTURE: CPT | Performed by: EMERGENCY MEDICINE

## 2025-07-25 PROCEDURE — 71046 X-RAY EXAM CHEST 2 VIEWS: CPT

## 2025-07-25 PROCEDURE — 360N000075 HC SURGERY LEVEL 2, PER MIN: Performed by: INTERNAL MEDICINE

## 2025-07-25 PROCEDURE — 96374 THER/PROPH/DIAG INJ IV PUSH: CPT

## 2025-07-25 PROCEDURE — 85018 HEMOGLOBIN: CPT | Performed by: EMERGENCY MEDICINE

## 2025-07-25 PROCEDURE — 96361 HYDRATE IV INFUSION ADD-ON: CPT

## 2025-07-25 PROCEDURE — 258N000003 HC RX IP 258 OP 636: Performed by: INTERNAL MEDICINE

## 2025-07-25 PROCEDURE — 86900 BLOOD TYPING SEROLOGIC ABO: CPT | Performed by: EMERGENCY MEDICINE

## 2025-07-25 PROCEDURE — 85610 PROTHROMBIN TIME: CPT | Performed by: EMERGENCY MEDICINE

## 2025-07-25 PROCEDURE — 84484 ASSAY OF TROPONIN QUANT: CPT | Performed by: EMERGENCY MEDICINE

## 2025-07-25 PROCEDURE — 999N000141 HC STATISTIC PRE-PROCEDURE NURSING ASSESSMENT: Performed by: INTERNAL MEDICINE

## 2025-07-25 PROCEDURE — 120N000001 HC R&B MED SURG/OB

## 2025-07-25 PROCEDURE — 99285 EMERGENCY DEPT VISIT HI MDM: CPT | Mod: 25 | Performed by: EMERGENCY MEDICINE

## 2025-07-25 RX ORDER — AMOXICILLIN 250 MG
1 CAPSULE ORAL 2 TIMES DAILY PRN
Status: DISCONTINUED | OUTPATIENT
Start: 2025-07-25 | End: 2025-07-29 | Stop reason: HOSPADM

## 2025-07-25 RX ORDER — NALOXONE HYDROCHLORIDE 0.4 MG/ML
0.2 INJECTION, SOLUTION INTRAMUSCULAR; INTRAVENOUS; SUBCUTANEOUS
Status: DISCONTINUED | OUTPATIENT
Start: 2025-07-25 | End: 2025-07-29 | Stop reason: HOSPADM

## 2025-07-25 RX ORDER — LIDOCAINE 40 MG/G
CREAM TOPICAL
Status: DISCONTINUED | OUTPATIENT
Start: 2025-07-25 | End: 2025-07-25 | Stop reason: HOSPADM

## 2025-07-25 RX ORDER — TICAGRELOR 90 MG/1
90 TABLET, FILM COATED ORAL 2 TIMES DAILY
Status: ON HOLD | COMMUNITY
End: 2025-07-29

## 2025-07-25 RX ORDER — ACETAMINOPHEN 650 MG/1
650 SUPPOSITORY RECTAL EVERY 4 HOURS PRN
Status: DISCONTINUED | OUTPATIENT
Start: 2025-07-25 | End: 2025-07-29 | Stop reason: HOSPADM

## 2025-07-25 RX ORDER — SODIUM CHLORIDE 9 MG/ML
INJECTION, SOLUTION INTRAVENOUS CONTINUOUS
Status: DISCONTINUED | OUTPATIENT
Start: 2025-07-25 | End: 2025-07-27

## 2025-07-25 RX ORDER — NALOXONE HYDROCHLORIDE 0.4 MG/ML
0.4 INJECTION, SOLUTION INTRAMUSCULAR; INTRAVENOUS; SUBCUTANEOUS
Status: DISCONTINUED | OUTPATIENT
Start: 2025-07-25 | End: 2025-07-29 | Stop reason: HOSPADM

## 2025-07-25 RX ORDER — ACETAMINOPHEN 325 MG/1
650 TABLET ORAL EVERY 4 HOURS PRN
Status: DISCONTINUED | OUTPATIENT
Start: 2025-07-25 | End: 2025-07-29 | Stop reason: HOSPADM

## 2025-07-25 RX ORDER — ONDANSETRON 2 MG/ML
4 INJECTION INTRAMUSCULAR; INTRAVENOUS
Status: DISCONTINUED | OUTPATIENT
Start: 2025-07-25 | End: 2025-07-25 | Stop reason: HOSPADM

## 2025-07-25 RX ORDER — AMOXICILLIN 250 MG
2 CAPSULE ORAL 2 TIMES DAILY PRN
Status: DISCONTINUED | OUTPATIENT
Start: 2025-07-25 | End: 2025-07-29 | Stop reason: HOSPADM

## 2025-07-25 RX ORDER — MELOXICAM 7.5 MG/1
7.5 TABLET ORAL DAILY PRN
Status: ON HOLD | COMMUNITY
End: 2025-07-29

## 2025-07-25 RX ORDER — MAGNESIUM CARB/ALUMINUM HYDROX 105-160MG
296 TABLET,CHEWABLE ORAL ONCE
Status: DISCONTINUED | OUTPATIENT
Start: 2025-07-26 | End: 2025-07-25

## 2025-07-25 RX ORDER — LIDOCAINE 40 MG/G
CREAM TOPICAL
Status: DISCONTINUED | OUTPATIENT
Start: 2025-07-25 | End: 2025-07-29 | Stop reason: HOSPADM

## 2025-07-25 RX ORDER — FLUMAZENIL 0.1 MG/ML
0.2 INJECTION, SOLUTION INTRAVENOUS
Status: ACTIVE | OUTPATIENT
Start: 2025-07-25 | End: 2025-07-26

## 2025-07-25 RX ORDER — POLYETHYLENE GLYCOL 3350 17 G/17G
238 POWDER, FOR SOLUTION ORAL ONCE
Status: DISCONTINUED | OUTPATIENT
Start: 2025-07-25 | End: 2025-07-25

## 2025-07-25 RX ORDER — CALCIUM CARBONATE 500 MG/1
1000 TABLET, CHEWABLE ORAL 4 TIMES DAILY PRN
Status: DISCONTINUED | OUTPATIENT
Start: 2025-07-25 | End: 2025-07-29 | Stop reason: HOSPADM

## 2025-07-25 RX ORDER — ATORVASTATIN CALCIUM 40 MG/1
80 TABLET, FILM COATED ORAL DAILY
Status: DISCONTINUED | OUTPATIENT
Start: 2025-07-25 | End: 2025-07-29 | Stop reason: HOSPADM

## 2025-07-25 RX ORDER — BISACODYL 5 MG
10 TABLET, DELAYED RELEASE (ENTERIC COATED) ORAL ONCE
Status: COMPLETED | OUTPATIENT
Start: 2025-07-25 | End: 2025-07-25

## 2025-07-25 RX ORDER — LIDOCAINE 4 G/G
1 PATCH TOPICAL DAILY PRN
COMMUNITY

## 2025-07-25 RX ADMIN — PANTOPRAZOLE SODIUM 80 MG: 40 INJECTION, POWDER, FOR SOLUTION INTRAVENOUS at 11:58

## 2025-07-25 RX ADMIN — POLYETHYLENE GLYCOL 3350, SODIUM SULFATE ANHYDROUS, SODIUM BICARBONATE, SODIUM CHLORIDE, POTASSIUM CHLORIDE 4000 ML: 236; 22.74; 6.74; 5.86; 2.97 POWDER, FOR SOLUTION ORAL at 18:40

## 2025-07-25 RX ADMIN — SODIUM CHLORIDE: 9 INJECTION, SOLUTION INTRAVENOUS at 19:00

## 2025-07-25 RX ADMIN — BISACODYL 10 MG: 5 TABLET, COATED ORAL at 17:13

## 2025-07-25 RX ADMIN — SODIUM CHLORIDE 1000 ML: 0.9 INJECTION, SOLUTION INTRAVENOUS at 11:49

## 2025-07-25 RX ADMIN — SODIUM CHLORIDE 500 ML: 0.9 INJECTION, SOLUTION INTRAVENOUS at 13:41

## 2025-07-25 ASSESSMENT — ACTIVITIES OF DAILY LIVING (ADL)
ADLS_ACUITY_SCORE: 48
ADLS_ACUITY_SCORE: 29
ADLS_ACUITY_SCORE: 48
ADLS_ACUITY_SCORE: 25
ADLS_ACUITY_SCORE: 48
ADLS_ACUITY_SCORE: 48
ADLS_ACUITY_SCORE: 25
ADLS_ACUITY_SCORE: 48
ADLS_ACUITY_SCORE: 48

## 2025-07-25 ASSESSMENT — COLUMBIA-SUICIDE SEVERITY RATING SCALE - C-SSRS
1. IN THE PAST MONTH, HAVE YOU WISHED YOU WERE DEAD OR WISHED YOU COULD GO TO SLEEP AND NOT WAKE UP?: NO
6. HAVE YOU EVER DONE ANYTHING, STARTED TO DO ANYTHING, OR PREPARED TO DO ANYTHING TO END YOUR LIFE?: NO
2. HAVE YOU ACTUALLY HAD ANY THOUGHTS OF KILLING YOURSELF IN THE PAST MONTH?: NO

## 2025-07-25 NOTE — PROCEDURES
PRE-PROCEDURE H&P    CHIEF COMPLAINT / REASON FOR PROCEDURE:  melena    PERTINENT HISTORY :    Past Medical History:   Diagnosis Date    Anemia 2012    after surgery    Aortic stenosis     cardiology visit follow up due Fall of 2021    Arthritis     Complication of anesthesia     Gastroesophageal reflux disease     rarely    Hepatitis     hepatitis A as a child    History of anesthesia complications     Tremors after surgery     Primary insomnia 10/5/2020      Past Surgical History:   Procedure Laterality Date    ARTHROSCOPY KNEE Left 2003    ARTHROSCOPY KNEE Right 2010    BIOPSY  2001    bone marrow donator    BUNIONECTOMY PORTER Bilateral 1990's    and hammer toe surgery    CV CORONARY ANGIOGRAM N/A 5/28/2025    Procedure: Coronary Angiogram;  Surgeon: Norris Maguire MD;  Location: [facility] CATH LAB CV    CV LEFT HEART CATH N/A 5/28/2025    Procedure: Left Heart Catheterization;  Surgeon: Norris Maguire MD;  Location: [facility] CATH LAB CV    CV PCI STENT DRUG ELUTING N/A 5/28/2025    Procedure: Percutaneous Coronary Intervention Stent;  Surgeon: Norris Maguire MD;  Location: [facility] CATH LAB CV    DAVINCI LAPAROSCOPIC CHOLECYSTECTOMY WITH GRAMS N/A 7/20/2025    Procedure: CHOLECYSTECTOMY, ROBOT-ASSISTED, LAPAROSCOPIC, WITH CHOLANGIOGRAM;  Surgeon: Sharmaine Swift MD;  Location: [facility] OR    ENT SURGERY  1950's    EXCISE MASS TRUNK N/A 11/5/2019    Procedure: EXCISION SUBCUTANEOUS MASS UPPER CHEST MIDLINE;  Surgeon: Karla Vasquez MD;  Location: [facility] OR    EXCISE CASTANO'S NEUROMA FOOT  2007 and  redo 2009    GYN SURGERY  1984    tubal ligation    ORTHOPEDIC SURGERY Right 01/2019    rotator cuff repair    SOFT TISSUE SURGERY  2012    lipoma removed from back         Bleeding tendencies:  No    Relevant Family History:  NONE     Relevant Social History:  NONE      A relevant review of systems was performed and was negative      ALLERGIES/SENSITIVITIES:   Allergies   Allergen Reactions    Hydrocodone  Swelling       CURRENT MEDICATIONS:   No current outpatient medications on file.        PRE-SEDATION ASSESSMENT:    Lung Exam:  normal  Heart Exam:  normal  Airway Exam: normal  Previous reaction to anesthesia/sedation:   No  Sedation plan based on assessment: mac  ASA Classification:  3 - Severe systemic disease, but not incapacitating        IMPRESSION:  melena    PLAN: egd    Stefanie Steen MD  [facility]  Office: [phone]

## 2025-07-25 NOTE — ED TRIAGE NOTES
Pt presents with concern for syncopal event and elevated HR. Had recent cholecystectomy. Reports +LOC this morning, denies hitting head. Reports multiple small black stools yesterday and today. On brillinta and asa A & Ox4     Triage Assessment (Adult)       Row Name 07/25/25 1042          Triage Assessment    Airway WDL WDL        Cardiac WDL    Cardiac WDL X;rhythm     Pulse Rate & Regularity tachycardic        Cognitive/Neuro/Behavioral WDL    Cognitive/Neuro/Behavioral WDL X  syncopal event

## 2025-07-25 NOTE — H&P
"[facility]    History and Physical - Hospitalist Service       Date of Admission:  7/25/2025    Assessment & Plan      Pamela Engle is a 74 year old female admitted on 7/25/2025.     Acute blood loss anemia  Postural changes  Syncope probably secondary to blood loss anemia  Patient came in with 2 days of melena.   Is on treatment with aspirin and Brilinta status post PCI done 5/28/2025  Start Protonix IV  Upper GI endoscopy did not show any sources of bleeding  Patient is going to have colonoscopy 7/26/2025 and is getting prep for that  Structural heart coordinator recommended that cardiology be consulted and to restart aspirin 81 mg as soon as possible and when stable to restart Plavix with a loading dose once bleeding is controlled    Abnormal liver functions  The liver function tests are decreasing as compared to recently    Aortic stenosis  Plan to do TAVR    Coronary artery disease  Single-vessel in RCA s/p RILEY stenting 5/28/2025  Of aspirin and Plavix at this time    Recent cholecystectomy for acute cholecystitis    GERD now on PPI Protonix        Diet:  N.p.o. in a.m.  DVT Prophylaxis: Pneumatic Compression Devices  Duran Catheter: Not present  Lines: None     Cardiac Monitoring: None  Code Status:  Full code    Clinically Significant Risk Factors Present on Admission                 # Drug Induced Platelet Defect: home medication list includes an antiplatelet medication        # Anemia: based on hgb <11       # Obesity: Estimated body mass index is 31.19 kg/m  as calculated from the following:    Height as of 7/18/25: 1.486 m (4' 10.5\").    Weight as of 7/18/25: 68.9 kg (151 lb 12.8 oz).              Disposition Plan     Medically Ready for Discharge:            Patrice Johnson MD  Hospitalist Service  [facility]  Securely message with [employer] (more info)  Text page via [facility] Paging/Directory "     ______________________________________________________________________    Chief Complaint   Dizziness    History is obtained from the patient, medical records and my discussion with emergency department physician      History of Present Illness   Pamela Engle is a 74 year old lady well-known to me who comes in with past medical history of aortic stenosis being worked up for TAVR, while working up for TAVR a coronary angiogram showed single-vessel disease in RCA which was stented 5/28/2025 following which patient is on aspirin and Brilinta.  She also had a robot assisted laparoscopic cholecystectomy with intraoperative cholangiogram by Dr. Chasidy Swift on 7/20/2025.     For the last 2 to 3 days patient has had increased heart rate when usually her heart rate is around 60s it increased 200s with blood pressure relatively lower 90/80 until yesterday when she started having black-colored stools.  On the day of admission to [facility] 7/25/2025 patient got up at 5:30 AM in the morning to go to the bathroom where she passed out after having a black-colored stool following which walking for her was very tiring and she had shortness of breath because of which she came to the hospital for further evaluation.  Her hemoglobin had decreased from 10.5 recently to 9.  At admission    She subsequently had upper GI endoscopy which did not show any signs of bleeding performed by Dr. Enio CHU with plan to do colonoscopy tomorrow.     Past Medical History    Past Medical History:   Diagnosis Date    Anemia 2012    after surgery    Aortic stenosis     cardiology visit follow up due Fall of 2021    Arthritis     Complication of anesthesia     Gastroesophageal reflux disease     rarely    Hepatitis     hepatitis A as a child    History of anesthesia complications     Tremors after surgery     Primary insomnia 10/5/2020       Past Surgical History   Past Surgical History:   Procedure Laterality Date    ARTHROSCOPY KNEE  Left 2003    ARTHROSCOPY KNEE Right 2010    BIOPSY  2001    bone marrow donator    BUNIONECTOMY PORTER Bilateral 1990's    and hammer toe surgery    CV CORONARY ANGIOGRAM N/A 5/28/2025    Procedure: Coronary Angiogram;  Surgeon: Norris Maguire MD;  Location: [facility] CATH LAB CV    CV LEFT HEART CATH N/A 5/28/2025    Procedure: Left Heart Catheterization;  Surgeon: Norris Maguire MD;  Location: [facility] CATH LAB CV    CV PCI STENT DRUG ELUTING N/A 5/28/2025    Procedure: Percutaneous Coronary Intervention Stent;  Surgeon: Norris Maguire MD;  Location: [facility] CATH LAB CV    DAVINCI LAPAROSCOPIC CHOLECYSTECTOMY WITH GRAMS N/A 7/20/2025    Procedure: CHOLECYSTECTOMY, ROBOT-ASSISTED, LAPAROSCOPIC, WITH CHOLANGIOGRAM;  Surgeon: Sharmaine Swift MD;  Location: [facility] OR    ENT SURGERY  1950's    EXCISE MASS TRUNK N/A 11/5/2019    Procedure: EXCISION SUBCUTANEOUS MASS UPPER CHEST MIDLINE;  Surgeon: Karla Vasquez MD;  Location: [facility] OR    EXCISE CASTANO'S NEUROMA FOOT  2007 and  redo 2009    GYN SURGERY  1984    tubal ligation    ORTHOPEDIC SURGERY Right 01/2019    rotator cuff repair    SOFT TISSUE SURGERY  2012    lipoma removed from back       Prior to Admission Medications   Prior to Admission Medications   Prescriptions Last Dose Informant Patient Reported? Taking?   Calcium-Magnesium-Zinc (HUGO-MAG-ZINC OR)   Yes No   Sig: Take 500 mg by mouth 2 times daily   Cholecalciferol (VITAMIN D3) 50 MCG (2000 UT) CAPS   Yes No   Sig: Take 2,000 Units by mouth every other day.   Multiple Vitamins-Minerals (HAIR SKIN AND NAILS FORMULA) TABS   Yes No   Sig: Take 1 tablet by mouth daily.   Multiple Vitamins-Minerals (MULTIVITAMIN PO)   Yes No   Sig: Take 1 tablet by mouth daily.   acetaminophen (TYLENOL) 500 MG tablet   Yes No   Sig: Take 1,000 mg by mouth every 8 hours as needed for mild pain.   alendronate (FOSAMAX) 70 MG tablet   No No   Sig: TAKE 1 TABLET (70 MG TOTAL) BY MOUTH EVERY 7 DAYS.   aspirin 81 MG EC  tablet   No No   Sig: Take 1 tablet (81 mg) by mouth daily. Start tomorrow.   atorvastatin (LIPITOR) 80 MG tablet   No No   Sig: Take 1 tablet (80 mg) by mouth daily.   clopidogrel (PLAVIX) 75 MG tablet   No No   Sig: Finish Brilinta.Then start, Clopidogrel 600 mg (8 tabs) by mouth once. Next morning, Clopidogrel 75 mg by mouth once daily.   metoprolol tartrate (LOPRESSOR) 25 MG tablet   No No   Sig: Take 0.5 tablets (12.5 mg) by mouth 2 times daily.   ondansetron (ZOFRAN ODT) 4 MG ODT tab   No No   Sig: Take 1 tablet (4 mg) by mouth every 6 hours as needed for nausea or vomiting.   valACYclovir (VALTREX) 1000 mg tablet   No No   Sig: TAKE TWO TABLETS BY MOUTH TWICE A DAY FOR ONE DAY AS NEEDED FOR COLD SORES      Facility-Administered Medications: None        Review of Systems    Denies any headache, blurring of vision or double vision, neck pain jaw pain or shoulder pain, chest pain, cough or increased sputum production, nausea or vomiting, abdominal pain, diarrhea or constipation.  Denies any urinary symptoms of burning or increased frequency. Denies any weakness of upper or lower extremities or any seizure activity. Fever or chills. Bleeding from anywhere else.  No rashes or cellulitis.      Social History   I have reviewed this patient's social history and updated it with pertinent information if needed.  Social History     Tobacco Use    Smoking status: Former     Current packs/day: 0.00     Average packs/day: 1 pack/day for 41.1 years (41.1 ttl pk-yrs)     Types: Cigarettes     Start date: 1/1/1969     Quit date: 2/1/2010     Years since quitting: 15.4    Smokeless tobacco: Never   Vaping Use    Vaping status: Never Used   Substance Use Topics    Alcohol use: Yes     Alcohol/week: 2.0 standard drinks of alcohol     Comment: 1-2 drinks per week    Drug use: No         Family History   I have reviewed this patient's family history and updated it with pertinent information if needed.  Family History   Problem  Relation Age of Onset    CABG Mother 77    Arthritis Mother     Kidney Disease Mother     Pacemaker Mother     Colon Cancer Father     Hypertension Father     Hyperlipidemia Father     Prostate Cancer Father     Dementia Father     CABG Father     CABG Sister 58    Diabetes Sister     Coronary Stenting Sister 63        2 stents    Substance Abuse Brother     Myocardial Infarction Brother     CABG Brother     Liver Cancer Maternal Grandmother     Breast Cancer Paternal Grandmother     Lung Cancer Paternal Grandfather          Allergies   Allergies   Allergen Reactions    Hydrocodone Swelling        Physical Exam   Vital Signs: Temp: 98  F (36.7  C) Temp src: Temporal BP: 111/63 Pulse: 90   Resp: 18 SpO2: 100 % O2 Device: None (Room air)    Weight: 0 lbs 0 oz      GENERAL: Patient does not look in any acute distress  HEENT: EOM+, Conjunctiva is clear   NECK:  no Jugular Venous distention  HEART: S1 S2 regular  Rate and Rhythm, Systolic murmur,    LUNGS: Respirations are  not laboured, Lungs are clear to auscultation without Crepitations or Wheezing   ABDOMEN: Soft , there is no tenderness ,  Bowel Sounds are Positive   LOWER LIMBS: no Pedal Edema  Bilaterally   CNS:  Alert,  Oriented x 3, Moving all the Four Limbs           Data   Most Recent 3 CBC's:  Recent Labs   Lab Test 07/25/25  1138 07/21/25  0439 07/20/25  0612   WBC 14.8* 6.7 4.1   HGB 9.0* 10.5* 11.6*   MCV 87 88 88   * 227 229     Most Recent 3 BMP's:  Recent Labs   Lab Test 07/25/25  1138 07/21/25  0439 07/20/25  0926 07/20/25  0612    138  --  141   POTASSIUM 4.7 3.7  --  4.1   CHLORIDE 101 104  --  108*   CO2 20* 22  --  20*   BUN 40.6* 13.0  --  8.9   CR 0.60 0.58  --  0.58   ANIONGAP 14 12  --  13   HUGO 9.2 8.5*  --  8.9   * 109* 97 103*     Most Recent 2 LFT's:  Recent Labs   Lab Test 07/25/25  1138 07/21/25  0439   AST 94* 138*   * 239*   ALKPHOS 238* 208*   BILITOTAL 0.8 1.3*     Most Recent 3 INR's:  Recent Labs   Lab  Test 07/25/25  1239 07/18/25  1121   INR 1.00 0.90

## 2025-07-25 NOTE — PROGRESS NOTES
07/25/25 1702   Skin   Skin WDL X;integrity   Skin Integrity other (see comments)  (Lap sites from prior surgery)   Other (see comments) Lap sites   Device Skin Interventions Taken No adjustments needed     Admitted/transferred from: ED  2 RN full   skin assessment completed by Cezar Jules, MAUREEN and Cheryl Dobson.  Skin assessment finding: other Lap site incisions   Interventions/actions: other NA     Will continue to monitor.     Pema Dobson, RN  I agree with the above finding during an in-person assessment.

## 2025-07-25 NOTE — ED NOTES
[facility]  ED Nurse Handoff Report    ED Chief complaint: Post-op Problem and Syncope  . ED Diagnosis:   Final diagnoses:   Upper GI bleed   Anemia due to blood loss, acute       Allergies:   Allergies   Allergen Reactions    Hydrocodone Swelling       Code Status: Full Code    Activity level - Baseline/Home:  independent.  Activity Level - Current:   assist of 1.   Lift room needed: No.   Bariatric: No   Needed: No   Isolation: No.   Infection: Not Applicable.     Respiratory status: Room air    Vital Signs (within 30 minutes):   Vitals:    07/25/25 1041 07/25/25 1228   BP: 110/83 111/63   Pulse: 120 90   Resp: 18    Temp: 98  F (36.7  C)    TempSrc: Temporal    SpO2: 100% 100%       Cardiac Rhythm:  ,      Pain level:    Patient confused: No.   Patient Falls Risk: patient and family education.   Elimination Status: Has voided     Patient Report - Initial Complaint: Post op problem, syncope.   Focused Assessment: Pamela Engle is a 74 year old female who presents with suspected GI bleed.  The patient had a cholecystectomy on 720.  Patient and May 2025.  Her last dose of Brilinta was yesterday evening at 7:30 PM.  Patient denies any fall or trauma.  She is 100% sure she did not hit her head.  She denies any pain or injury from her syncopal episode.  Patient states that she is very out of breath with ambulating.  She has had some very minor chest discomfort.  No leg pain or swelling.  History tyesterday noted elevated heart rate.  She had 1 small episode of darker stool.  Today, patient again noted higher heart rate.  She had 4 very small episodes of dark black tarry stool.  She had a syncopal episode while on the toilet today.  Last bowel movement was approximately 1 hour prior to arrival.  Patient is on Brilinta and aspirin for stent placement.  Patient states that she does not really have abdominal pain.  She has some mild discomfort at the incision sites.  There is no  drainage.  Her fluid intake has been good.  She has been constipated postsurgery and had just these very small bowel movements today.  No fevers.      Abnormal Results:   Labs Ordered and Resulted from Time of ED Arrival to Time of ED Departure   COMPREHENSIVE METABOLIC PANEL (LIMITED OCCURRENCES) - Abnormal       Result Value    Sodium 135      Potassium 4.7      Carbon Dioxide (CO2) 20 (*)     Anion Gap 14      Urea Nitrogen 40.6 (*)     Creatinine 0.60      GFR Estimate >90      Calcium 9.2      Chloride 101      Glucose 137 (*)     Alkaline Phosphatase 238 (*)     AST 94 (*)      (*)     Protein Total 7.1      Albumin 4.2      Bilirubin Total 0.8     OCCULT BLOOD STOOL - Abnormal    Occult Blood Positive (*)    CBC WITH PLATELETS AND DIFFERENTIAL - Abnormal    WBC Count 14.8 (*)     RBC Count 3.03 (*)     Hemoglobin 9.0 (*)     Hematocrit 26.4 (*)     MCV 87      MCH 29.7      MCHC 34.1      RDW 15.6 (*)     Platelet Count 527 (*)     % Neutrophils 74      % Lymphocytes 15      % Monocytes 6      % Eosinophils 2      % Basophils 1      % Immature Granulocytes 3      NRBCs per 100 WBC 0      Absolute Neutrophils 10.9 (*)     Absolute Lymphocytes 2.2      Absolute Monocytes 0.9      Absolute Eosinophils 0.3      Absolute Basophils 0.1      Absolute Immature Granulocytes 0.4      Absolute NRBCs 0.0     TROPONIN T, HIGH SENSITIVITY - Normal    Troponin T, High Sensitivity 8     INR - Normal    INR 1.00      PT 13.3     LIPASE - Normal    Lipase 27     TROPONIN T, HIGH SENSITIVITY   HEMOGLOBIN   TYPE AND SCREEN, ADULT    ABO/RH(D) O NEG      Antibody Screen Negative      SPECIMEN EXPIRATION DATE 7/28/2025 11:59:00 PM CDT     ABO/RH TYPE AND SCREEN        Chest XR,  PA & LAT    (Results Pending)       Treatments provided: Fluids, protonix  Family Comments: Son at bedside  OBS brochure/video discussed/provided to patient:  N/A  ED Medications:   Medications   sodium chloride 0.9% BOLUS 500 mL (has no  administration in time range)   sodium chloride 0.9% BOLUS 1,000 mL (0 mLs Intravenous Stopped 7/25/25 1315)   pantoprazole (PROTONIX) injection 80 mg (80 mg Intravenous $Given 7/25/25 1158)       Drips infusing:  No  For the majority of the shift this patient was Green.   Interventions performed were N/A.    Sepsis treatment initiated: No    Cares/treatment/interventions/medications to be completed following ED care: Per MAR and orders    ED Nurse Name: Maia To RN  1:28 PM  RECEIVING UNIT ED HANDOFF REVIEW    Above ED Nurse Handoff Report was reviewed: Yes  Reviewed by: Cezar Jules RN on July 25, 2025 at 2:32 PM   OLIVIA Mccann called the ED to inform them the note was read: No

## 2025-07-25 NOTE — CONSULTS
GASTROENTEROLOGY CONSULTATION     Pamela Engle  [address]  [place] [address]  74 year old female    Admission Date/Time: 7/25/2025  Primary Care Provider: Maxine Bonner    We were asked to see the patient in consultation by Dr. Johnson for evaluation of melena.        HPI:  Pamela Engle is a 74 year old female with a past medical history significant for severe aortic stenosis, coronary artery disease status post stent to the RCA on May 28, 2025 on aspirin and Brilinta, cholecystectomy on 7/20/2025, and GERD who presents with melena.  Patient reports melena started yesterday, she passed out this morning, has been feeling weak.  Denies hematemesis.  Does not take any other NSAIDs.  Denies abdominal pain.    BUN is elevated at 40.6, creatinine is normal.  Alkaline phosphatase elevated at 238, ALT elevated at 168, AST elevated at 94, total bilirubin normal; LFTs are improved from 4 days ago with alkaline phosphatase staying about the same.  White blood cell count elevated at 14.8, hemoglobin 7.7 open (4 days ago was 10.5, earlier today was 9.0).  Platelets elevated at 527.  INR normal.    October 28, 2024, colonoscopy done for history of previous advanced adenomatous polyps.  Terminal ileum normal.  Left-sided diverticulosis, external hemorrhoids, 2 small polyps removed of which 1 it was a tubular adenoma and the other was a hyperplastic polyp.    ROS: A comprehensive ten point review of systems was negative aside from those in mentioned in the HPI.      MEDICATIONS:   No current facility-administered medications for this encounter.       ALLERGIES:   Allergies   Allergen Reactions    Hydrocodone Swelling       Past Medical History:   Diagnosis Date    Anemia 2012    after surgery    Aortic stenosis     cardiology visit follow up due Fall of 2021    Arthritis     Complication of anesthesia     Gastroesophageal reflux disease     rarely    Hepatitis     hepatitis A as a child    History of anesthesia  complications     Tremors after surgery     Primary insomnia 10/5/2020       Past Surgical History:   Procedure Laterality Date    ARTHROSCOPY KNEE Left 2003    ARTHROSCOPY KNEE Right 2010    BIOPSY  2001    bone marrow donator    BUNIONECTOMY PORTER Bilateral 1990's    and hammer toe surgery    CV CORONARY ANGIOGRAM N/A 5/28/2025    Procedure: Coronary Angiogram;  Surgeon: Norris Maguire MD;  Location: [facility] CATH LAB CV    CV LEFT HEART CATH N/A 5/28/2025    Procedure: Left Heart Catheterization;  Surgeon: Norris Maguire MD;  Location: [facility] CATH LAB CV    CV PCI STENT DRUG ELUTING N/A 5/28/2025    Procedure: Percutaneous Coronary Intervention Stent;  Surgeon: Norris Maguire MD;  Location: [facility] CATH LAB CV    DAVINCI LAPAROSCOPIC CHOLECYSTECTOMY WITH GRAMS N/A 7/20/2025    Procedure: CHOLECYSTECTOMY, ROBOT-ASSISTED, LAPAROSCOPIC, WITH CHOLANGIOGRAM;  Surgeon: Sharmaine Swift MD;  Location: [facility] OR    ENT SURGERY  1950's    EXCISE MASS TRUNK N/A 11/5/2019    Procedure: EXCISION SUBCUTANEOUS MASS UPPER CHEST MIDLINE;  Surgeon: Karla Vasquez MD;  Location: [facility] OR    EXCISE CASTANO'S NEUROMA FOOT  2007 and  redo 2009    GYN SURGERY  1984    tubal ligation    ORTHOPEDIC SURGERY Right 01/2019    rotator cuff repair    SOFT TISSUE SURGERY  2012    lipoma removed from back         SOCIAL HISTORY:  Social History     Tobacco Use    Smoking status: Former     Current packs/day: 0.00     Average packs/day: 1 pack/day for 41.1 years (41.1 ttl pk-yrs)     Types: Cigarettes     Start date: 1/1/1969     Quit date: 2/1/2010     Years since quitting: 15.4    Smokeless tobacco: Never   Vaping Use    Vaping status: Never Used   Substance Use Topics    Alcohol use: Yes     Alcohol/week: 2.0 standard drinks of alcohol     Comment: 1-2 drinks per week    Drug use: No       FAMILY HISTORY:  Reviewed in her chart    PHYSICAL EXAM:   /66   Pulse 96   Temp 98  F (36.7  C) (Temporal)   Resp 16   Wt 66.2 kg  (146 lb)   SpO2 (!) 70%   BMI 29.99 kg/m      Constitutional: NAD, comfortable  Cardiovascular: RRR  Respiratory: CTAB  Psychiatric: mentation appears normal and affect normal/bright  Head: Normocephalic. Atraumatic.    Neck: Neck supple.   Eyes:  no icterus  ENT: hearing adequate  Abdomen:   Soft.,nt/nd          ADDITIONAL COMMENTS:   I reviewed the patient's new clinical lab test results.   Recent Labs   Lab Test 07/25/25  1330 07/25/25  1239 07/25/25  1138 07/21/25  0439 07/20/25  0612 07/19/25  0441 07/18/25  1121   WBC  --   --  14.8* 6.7 4.1   < > 7.2   HGB 7.7*  --  9.0* 10.5* 11.6*   < > 13.9   MCV 90  --  87 88 88   < > 89   PLT  --   --  527* 227 229   < > 254   INR  --  1.00  --   --   --   --  0.90    < > = values in this interval not displayed.     Recent Labs   Lab Test 07/25/25  1138 07/21/25  0439 07/20/25  0926 07/20/25  0612    138  --  141   POTASSIUM 4.7 3.7  --  4.1   CHLORIDE 101 104  --  108*   CO2 20* 22  --  20*   BUN 40.6* 13.0  --  8.9   CR 0.60 0.58  --  0.58   ANIONGAP 14 12  --  13   HUGO 9.2 8.5*  --  8.9   * 109* 97 103*     Recent Labs   Lab Test 07/25/25  1138 07/21/25  0439 07/20/25  0612 07/19/25  0441 07/18/25  1121   ALBUMIN 4.2 3.4* 3.7   < > 4.6   BILITOTAL 0.8 1.3* 2.4*   < > 2.0*   * 239* 339*   < > 380*   AST 94* 138* 178*   < > 194*   ALKPHOS 238* 208* 240*   < > 236*   PROTEIN  --   --   --   --  20*   LIPASE 27  --   --   --  25   AMYLASE  --  55  --   --   --     < > = values in this interval not displayed.             .    CONSULTATION ASSESSMENT AND PLAN:    Pamela Engle is a 74 year old female with a past medical history significant for severe aortic stenosis, coronary artery disease status post stent to the RCA on May 28, 2025 on aspirin and Brilinta, cholecystectomy on 7/20/2025, and GERD who presents with melena, hgb 7.7 (was 10.5 four days ago).      EGD today to evaluate for PUD, AVMs, esophagitis, duodenitis, gastritis.    Sugey Watts  MD Enio  [facility]

## 2025-07-25 NOTE — PROGRESS NOTES
RN Valve Team Inpatient Note     Summary: Patient being followed by the N structural heart team. Monitoring her aortic stenosis. Patient is s/p PCI with our team 5/28/25.     DAPT held last week for gallbladder surgery and now holding again due to concern for bleed.     Physical Exam: Deferred      Plan: Would recommend consulting cardiology to monitor patient. Interventional/structural team recommends restarting 81 mg ASA as soon as possible. When stable restart plavix with a loading dose once bleeding controlled. Please call our team with any questions/concerns about these recommendations.     Dangelo Florian RN BSN  Structural Heart Coordinator   [facility]  [phone]

## 2025-07-25 NOTE — ED NOTES
Bed: [facility]  Expected date:   Expected time:   Means of arrival:   Comments:  1 Green pod RN, 2 Boarder RNs

## 2025-07-25 NOTE — ED PROVIDER NOTES
History     Chief Complaint:  Post-op Problem and Syncope     HPI   Pamela Engle is a 74 year old female who presents with suspected GI bleed.  The patient had a cholecystectomy on 720.  Patient and May 2025.  Her last dose of Brilinta was yesterday evening at 7:30 PM.  Patient denies any fall or trauma.  She is 100% sure she did not hit her head.  She denies any pain or injury from her syncopal episode.  Patient states that she is very out of breath with ambulating.  She has had some very minor chest discomfort.  No leg pain or swelling.  History tyesterday noted elevated heart rate.  She had 1 small episode of darker stool.  Today, patient again noted higher heart rate.  She had 4 very small episodes of dark black tarry stool.  She had a syncopal episode while on the toilet today.  Last bowel movement was approximately 1 hour prior to arrival.  Patient is on Brilinta and aspirin for stent placement.  Patient states that she does not really have abdominal pain.  She has some mild discomfort at the incision sites.  There is no drainage.  Her fluid intake has been good.  She has been constipated postsurgery and had just these very small bowel movements today.  No fevers.    Review of External notes  I reviewed discharge summary from 07/18/2028 regarding patient's presentation for a post op problem and syncope.      Pamela Engle is a 74 year old lady with severe aortic stenosis being worked up for TAVR and in the process of workup underwent coronary angiogram which showed a single-vessel RCA disease which was stented and started on aspirin and Brilinta 5/28/2025, came into [facility] 7/18/2025 with 4-day history of chest discomfort, malaise, flulike symptoms, rigors and chills with a temperature of 101.2  F and symptoms getting worse her AST was 194  alkaline phosphatase 236 total bilirubin 2 and direct bilirubin 1.6 ultrasound of the abdomen showed distention of gallbladder and CT scan  showed indications of cholecystitis MRCP done 7/18/2025 showed distended gallbladder with normal intra and extrahepatic biliary ducts without any intraductal stones or strictures.  Aspirin was withheld with the plan to restart it as soon as it is possible by cardiology.  On 7/20/2025 patient underwent robot-assisted laparoscopic cholecystectomy with intraoperative cholangiogram by Dr. Chasidy Swift.  There was possible debris seen near sphincter of Oddi on cholangiogram I discussed with gastroenterology and with LFTs decreasing GI thinks that she can go home and follow-up on the LFTs.  Surgery is going to follow-up with the patient as outpatient.  She should check a CMP before seeing her primary care physician within 1 week.     Medications:    alendronate   aspirin 81 MG EC tablet  atorvastatin   Calcium-Magnesium-Zinc   Cholecalciferol  clopidogrel   metoprolol tartrate   Multiple Vitamins-Minerals   ondansetron   valACYclovir     Past Medical History:    Anemia  Aortic stenosis  Arthritis  Complication of anesthesia  GERD  Hepatitis   Primary insomnia     Past Surgical History:    Rotator cuff repair (right)  Laminectomy  Replacement total knee (right)  Excision lipoma  Bunionectomy (bilateral)  Excision neuroma santamaria's (right)  Correction hammer toe  Tonsillectomy  Phacoemulsification cataract with intraocular lens implantation (x2)  Tubal ligation  Cholecystectomy   Coronary angiogram  Left heart catheterization  PCI stent drug eluting     Physical Exam   Patient Vitals for the past 24 hrs:   BP Temp Temp src Pulse Resp SpO2   07/25/25 1228 111/63 -- -- 90 -- 100 %   07/25/25 1041 110/83 98  F (36.7  C) Temporal 120 18 100 %      Physical Exam    Gen: alert  Neck: normal ROM  CV: regular tachycardia, 2+ distal pulses in all 4 extremities,   Pulm: breath sounds equal, lungs clear  Abd: Soft, nontender. Laparoscopic abdominal incisions c/d/i  Back: no evidence of injury  MSK: no lower extremity edema, no calf  tenderness  Skin: surgical incisions to abd c/d/I and no redness  Neuro: alert, appropriate conversation and interaction     Emergency Department Course   ECG:   ECG results from 07/25/25   EKG 12-lead, tracing only     Value    Systolic Blood Pressure     Diastolic Blood Pressure     Ventricular Rate 90    Atrial Rate 90    CO Interval 128    QRS Duration 84        QTc 455    P Axis 49    R AXIS 29    T Axis 93    Interpretation ECG      Sinus rhythm  Nonspecific T wave abnormality  Abnormal ECG  When compared with ECG of 28-May-2025 10:49,  Nonspecific T wave abnormality now evident in Lateral leads  Unconfirmed report - interpretation of this ECG is computer generated - see medical record for final interpretation  Confirmed by - EMERGENCY ROOM, PHYSICIAN ([id number]),  Cheko Johnson ([id number]) on 7/25/2025 11:38:23 AM       ECG  ECG taken at 1118, ECG read at 1123  Normal sinus rhythm  Nonspecific T wave abnormality  T wave inversion   I aVL new as compared to prior, dated 07/18/2025  Rate 90 bpm. CO interval 128 ms. QRS duration 84 ms. QT/QTc 372/455 ms. P-R-T axes 49 29 93.       Imaging:  Chest XR,  PA & LAT    (Results Pending)      Laboratory:  Labs Ordered and Resulted from Time of ED Arrival to Time of ED Departure   COMPREHENSIVE METABOLIC PANEL (LIMITED OCCURRENCES) - Abnormal       Result Value    Sodium 135      Potassium 4.7      Carbon Dioxide (CO2) 20 (*)     Anion Gap 14      Urea Nitrogen 40.6 (*)     Creatinine 0.60      GFR Estimate >90      Calcium 9.2      Chloride 101      Glucose 137 (*)     Alkaline Phosphatase 238 (*)     AST 94 (*)      (*)     Protein Total 7.1      Albumin 4.2      Bilirubin Total 0.8     OCCULT BLOOD STOOL - Abnormal    Occult Blood Positive (*)    CBC WITH PLATELETS AND DIFFERENTIAL - Abnormal    WBC Count 14.8 (*)     RBC Count 3.03 (*)     Hemoglobin 9.0 (*)     Hematocrit 26.4 (*)     MCV 87      MCH 29.7      MCHC 34.1      RDW 15.6 (*)     Platelet  Count 527 (*)     % Neutrophils 74      % Lymphocytes 15      % Monocytes 6      % Eosinophils 2      % Basophils 1      % Immature Granulocytes 3      NRBCs per 100 WBC 0      Absolute Neutrophils 10.9 (*)     Absolute Lymphocytes 2.2      Absolute Monocytes 0.9      Absolute Eosinophils 0.3      Absolute Basophils 0.1      Absolute Immature Granulocytes 0.4      Absolute NRBCs 0.0     TROPONIN T, HIGH SENSITIVITY - Normal    Troponin T, High Sensitivity 8     INR - Normal    INR 1.00      PT 13.3     LIPASE - Normal    Lipase 27     TROPONIN T, HIGH SENSITIVITY   HEMOGLOBIN   TYPE AND SCREEN, ADULT    ABO/RH(D) O NEG      SPECIMEN EXPIRATION DATE 7/28/2025 11:59:00 PM CDT     ABO/RH TYPE AND SCREEN        ED Course as of 07/25/25 1521   Fri Jul 25, 2025   1107 I obtained history and examined the patient as noted above.     1314 I spoke with Dr. Patrice Johnson, hospitalist, regarding the patient's history and presentation in the emergency department today. Dr. Patrice Johnson accepted the patient for admission.    1352 I rechecked with patient and explained findings     Interventions:  Medications   sodium chloride 0.9% BOLUS 500 mL (has no administration in time range)   sodium chloride 0.9% BOLUS 1,000 mL (1,000 mLs Intravenous $New Bag 7/25/25 1149)   pantoprazole (PROTONIX) injection 80 mg (80 mg Intravenous $Given 7/25/25 1158)        Impression & Plan    Independent Interpretation:  CXR- no infiltrate or effusion  See ED course    Medical Decision Making:  Patient presents with melena.  She is recently status post cystectomy.  Abdominal exam is benign.  Surgical incisions intact.  Patient brings a small stool sample which does appear to be gross melena here.  Guaiac positive.  Patient reports hemoglobin at home yesterday with home RN check was 10.5.  Initial check 9 here.  After IV fluid resuscitation rechecked 7.7.  This is consistent with GI bleeding.  Patient is at risk for this secondary to use of aspirin and Plavix  secondary to recent cardiac stent.  Regarding syncope, this is likely secondary to anemia and GI bleed.  EKG showed sinus rhythm without ischemic change troponin delta troponin flat.  Chest x-ray reassuring without signs of infiltrate effusion or pneumothorax.  Discussed with admitting hospitalist.  Discussed with Dr. Rhodes of GI.  Blood consent obtained.  Tachycardia improved after IV fluids.  Hemoglobin 7.7.  Notified GI following hemoglobin and patient expected to be taken for EGD EGD imminently.  Given improvement in tachycardia and imminent EGD, no indication for Red cell transfusion at this time.  I did not suspect that there was any other acute intra-abdominal process or surgical complication based on benign abdominal exam and clear source of anemia.  No indication for further abdominal imaging at this time.    Disposition:  Admission was discussed with and accepted by Dr. Patrice Johnson    Diagnosis:    ICD-10-CM    1. Upper GI bleed  K92.2 Case Request: ESOPHAGOGASTRODUODENOSCOPY     Case Request: ESOPHAGOGASTRODUODENOSCOPY      2. Anemia due to blood loss, acute  D62 Case Request: COLONOSCOPY     Case Request: COLONOSCOPY      3. Aortic valve stenosis  I35.0 Patient care order     Patient care order         Scribe Disclosure:  Fermin BAKER, am serving as a scribe at 1:14 PM on 7/25/2025 to document services personally performed by Ambar Calderon MD based on my observations and the provider's statements to me.    Ambar Calderon  July 25, 2025       Ambar Calderon MD  07/25/25 [id number]

## 2025-07-26 ENCOUNTER — ANESTHESIA EVENT (OUTPATIENT)
Dept: SURGERY | Facility: CLINIC | Age: 75
End: 2025-07-26
Payer: COMMERCIAL

## 2025-07-26 ENCOUNTER — ANESTHESIA (OUTPATIENT)
Dept: SURGERY | Facility: CLINIC | Age: 75
End: 2025-07-26
Payer: COMMERCIAL

## 2025-07-26 ENCOUNTER — APPOINTMENT (OUTPATIENT)
Dept: CT IMAGING | Facility: CLINIC | Age: 75
End: 2025-07-26
Attending: INTERNAL MEDICINE
Payer: COMMERCIAL

## 2025-07-26 LAB
ALBUMIN SERPL BCG-MCNC: 3 G/DL (ref 3.5–5.2)
ALP SERPL-CCNC: 163 U/L (ref 40–150)
ALT SERPL W P-5'-P-CCNC: 111 U/L (ref 0–50)
ANION GAP SERPL CALCULATED.3IONS-SCNC: 11 MMOL/L (ref 7–15)
AST SERPL W P-5'-P-CCNC: 67 U/L (ref 0–45)
BILIRUB SERPL-MCNC: 0.5 MG/DL
BLD PROD TYP BPU: NORMAL
BLD PROD TYP BPU: NORMAL
BLOOD COMPONENT TYPE: NORMAL
BLOOD COMPONENT TYPE: NORMAL
BUN SERPL-MCNC: 20.1 MG/DL (ref 8–23)
CALCIUM SERPL-MCNC: 7.6 MG/DL (ref 8.8–10.4)
CHLORIDE SERPL-SCNC: 112 MMOL/L (ref 98–107)
CODING SYSTEM: NORMAL
CODING SYSTEM: NORMAL
COLONOSCOPY: NORMAL
CREAT SERPL-MCNC: 0.48 MG/DL (ref 0.51–0.95)
CROSSMATCH: NORMAL
CROSSMATCH: NORMAL
EGFRCR SERPLBLD CKD-EPI 2021: >90 ML/MIN/1.73M2
ERYTHROCYTE [DISTWIDTH] IN BLOOD BY AUTOMATED COUNT: 16.1 % (ref 10–15)
GLUCOSE BLDC GLUCOMTR-MCNC: 98 MG/DL (ref 70–99)
GLUCOSE SERPL-MCNC: 99 MG/DL (ref 70–99)
HAPTOGLOB SERPL-MCNC: 120 MG/DL (ref 30–200)
HCO3 SERPL-SCNC: 22 MMOL/L (ref 22–29)
HCT VFR BLD AUTO: 17.8 % (ref 35–47)
HGB BLD-MCNC: 5.8 G/DL (ref 11.7–15.7)
HGB BLD-MCNC: 9.1 G/DL (ref 11.7–15.7)
ISSUE DATE AND TIME: NORMAL
ISSUE DATE AND TIME: NORMAL
LDH SERPL L TO P-CCNC: 181 U/L (ref 0–250)
MCH RBC QN AUTO: 29.3 PG (ref 26.5–33)
MCHC RBC AUTO-ENTMCNC: 32.6 G/DL (ref 31.5–36.5)
MCV RBC AUTO: 87 FL (ref 78–100)
MCV RBC AUTO: 90 FL (ref 78–100)
PLATELET # BLD AUTO: 301 10E3/UL (ref 150–450)
POTASSIUM SERPL-SCNC: 3.5 MMOL/L (ref 3.4–5.3)
PROT SERPL-MCNC: 4.9 G/DL (ref 6.4–8.3)
RBC # BLD AUTO: 1.98 10E6/UL (ref 3.8–5.2)
SODIUM SERPL-SCNC: 145 MMOL/L (ref 135–145)
UNIT ABO/RH: NORMAL
UNIT ABO/RH: NORMAL
UNIT NUMBER: NORMAL
UNIT NUMBER: NORMAL
UNIT STATUS: NORMAL
UNIT STATUS: NORMAL
UNIT TYPE ISBT: 9500
UNIT TYPE ISBT: 9500
WBC # BLD AUTO: 8.3 10E3/UL (ref 4–11)

## 2025-07-26 PROCEDURE — 36415 COLL VENOUS BLD VENIPUNCTURE: CPT | Performed by: INTERNAL MEDICINE

## 2025-07-26 PROCEDURE — 250N000011 HC RX IP 250 OP 636: Performed by: INTERNAL MEDICINE

## 2025-07-26 PROCEDURE — 82565 ASSAY OF CREATININE: CPT | Performed by: INTERNAL MEDICINE

## 2025-07-26 PROCEDURE — 258N000003 HC RX IP 258 OP 636: Performed by: INTERNAL MEDICINE

## 2025-07-26 PROCEDURE — 0DJD8ZZ INSPECTION OF LOWER INTESTINAL TRACT, VIA NATURAL OR ARTIFICIAL OPENING ENDOSCOPIC: ICD-10-PCS | Performed by: INTERNAL MEDICINE

## 2025-07-26 PROCEDURE — 250N000013 HC RX MED GY IP 250 OP 250 PS 637: Performed by: INTERNAL MEDICINE

## 2025-07-26 PROCEDURE — P9016 RBC LEUKOCYTES REDUCED: HCPCS | Performed by: INTERNAL MEDICINE

## 2025-07-26 PROCEDURE — 370N000017 HC ANESTHESIA TECHNICAL FEE, PER MIN: Performed by: INTERNAL MEDICINE

## 2025-07-26 PROCEDURE — 74177 CT ABD & PELVIS W/CONTRAST: CPT

## 2025-07-26 PROCEDURE — 272N000001 HC OR GENERAL SUPPLY STERILE: Performed by: INTERNAL MEDICINE

## 2025-07-26 PROCEDURE — 99222 1ST HOSP IP/OBS MODERATE 55: CPT | Mod: 24 | Performed by: INTERNAL MEDICINE

## 2025-07-26 PROCEDURE — 710N000012 HC RECOVERY PHASE 2, PER MINUTE: Performed by: INTERNAL MEDICINE

## 2025-07-26 PROCEDURE — 99232 SBSQ HOSP IP/OBS MODERATE 35: CPT | Performed by: INTERNAL MEDICINE

## 2025-07-26 PROCEDURE — 120N000001 HC R&B MED SURG/OB

## 2025-07-26 PROCEDURE — 258N000003 HC RX IP 258 OP 636: Performed by: NURSE ANESTHETIST, CERTIFIED REGISTERED

## 2025-07-26 PROCEDURE — 360N000075 HC SURGERY LEVEL 2, PER MIN: Performed by: INTERNAL MEDICINE

## 2025-07-26 PROCEDURE — 85014 HEMATOCRIT: CPT | Performed by: INTERNAL MEDICINE

## 2025-07-26 PROCEDURE — 250N000009 HC RX 250: Performed by: INTERNAL MEDICINE

## 2025-07-26 PROCEDURE — 85018 HEMOGLOBIN: CPT | Performed by: INTERNAL MEDICINE

## 2025-07-26 PROCEDURE — 82947 ASSAY GLUCOSE BLOOD QUANT: CPT | Performed by: INTERNAL MEDICINE

## 2025-07-26 PROCEDURE — 83010 ASSAY OF HAPTOGLOBIN QUANT: CPT | Performed by: INTERNAL MEDICINE

## 2025-07-26 PROCEDURE — 999N000141 HC STATISTIC PRE-PROCEDURE NURSING ASSESSMENT: Performed by: INTERNAL MEDICINE

## 2025-07-26 PROCEDURE — 83615 LACTATE (LD) (LDH) ENZYME: CPT | Performed by: INTERNAL MEDICINE

## 2025-07-26 PROCEDURE — 250N000011 HC RX IP 250 OP 636: Performed by: NURSE ANESTHETIST, CERTIFIED REGISTERED

## 2025-07-26 RX ORDER — PROPOFOL 10 MG/ML
INJECTION, EMULSION INTRAVENOUS CONTINUOUS PRN
Status: DISCONTINUED | OUTPATIENT
Start: 2025-07-26 | End: 2025-07-26

## 2025-07-26 RX ORDER — LIDOCAINE 40 MG/G
CREAM TOPICAL
Status: DISCONTINUED | OUTPATIENT
Start: 2025-07-26 | End: 2025-07-26 | Stop reason: HOSPADM

## 2025-07-26 RX ORDER — PROPOFOL 10 MG/ML
INJECTION, EMULSION INTRAVENOUS PRN
Status: DISCONTINUED | OUTPATIENT
Start: 2025-07-26 | End: 2025-07-26

## 2025-07-26 RX ORDER — ONDANSETRON 4 MG/1
4 TABLET, ORALLY DISINTEGRATING ORAL EVERY 30 MIN PRN
Status: DISCONTINUED | OUTPATIENT
Start: 2025-07-26 | End: 2025-07-26 | Stop reason: HOSPADM

## 2025-07-26 RX ORDER — FENTANYL CITRATE 50 UG/ML
25 INJECTION, SOLUTION INTRAMUSCULAR; INTRAVENOUS EVERY 5 MIN PRN
Status: DISCONTINUED | OUTPATIENT
Start: 2025-07-26 | End: 2025-07-26 | Stop reason: HOSPADM

## 2025-07-26 RX ORDER — ONDANSETRON 2 MG/ML
4 INJECTION INTRAMUSCULAR; INTRAVENOUS EVERY 30 MIN PRN
Status: DISCONTINUED | OUTPATIENT
Start: 2025-07-26 | End: 2025-07-26 | Stop reason: HOSPADM

## 2025-07-26 RX ORDER — DEXAMETHASONE SODIUM PHOSPHATE 4 MG/ML
4 INJECTION, SOLUTION INTRA-ARTICULAR; INTRALESIONAL; INTRAMUSCULAR; INTRAVENOUS; SOFT TISSUE
Status: DISCONTINUED | OUTPATIENT
Start: 2025-07-26 | End: 2025-07-26 | Stop reason: HOSPADM

## 2025-07-26 RX ORDER — IOPAMIDOL 755 MG/ML
73 INJECTION, SOLUTION INTRAVASCULAR ONCE
Status: COMPLETED | OUTPATIENT
Start: 2025-07-26 | End: 2025-07-26

## 2025-07-26 RX ORDER — NALOXONE HYDROCHLORIDE 0.4 MG/ML
0.1 INJECTION, SOLUTION INTRAMUSCULAR; INTRAVENOUS; SUBCUTANEOUS
Status: DISCONTINUED | OUTPATIENT
Start: 2025-07-26 | End: 2025-07-26 | Stop reason: HOSPADM

## 2025-07-26 RX ORDER — SODIUM CHLORIDE, SODIUM LACTATE, POTASSIUM CHLORIDE, CALCIUM CHLORIDE 600; 310; 30; 20 MG/100ML; MG/100ML; MG/100ML; MG/100ML
INJECTION, SOLUTION INTRAVENOUS CONTINUOUS PRN
Status: DISCONTINUED | OUTPATIENT
Start: 2025-07-26 | End: 2025-07-26

## 2025-07-26 RX ORDER — SODIUM CHLORIDE, SODIUM LACTATE, POTASSIUM CHLORIDE, CALCIUM CHLORIDE 600; 310; 30; 20 MG/100ML; MG/100ML; MG/100ML; MG/100ML
INJECTION, SOLUTION INTRAVENOUS CONTINUOUS
Status: DISCONTINUED | OUTPATIENT
Start: 2025-07-26 | End: 2025-07-26 | Stop reason: HOSPADM

## 2025-07-26 RX ORDER — FENTANYL CITRATE 50 UG/ML
50 INJECTION, SOLUTION INTRAMUSCULAR; INTRAVENOUS EVERY 5 MIN PRN
Status: DISCONTINUED | OUTPATIENT
Start: 2025-07-26 | End: 2025-07-26 | Stop reason: HOSPADM

## 2025-07-26 RX ADMIN — SODIUM CHLORIDE, SODIUM LACTATE, POTASSIUM CHLORIDE, AND CALCIUM CHLORIDE: .6; .31; .03; .02 INJECTION, SOLUTION INTRAVENOUS at 09:26

## 2025-07-26 RX ADMIN — ATORVASTATIN CALCIUM 80 MG: 40 TABLET, FILM COATED ORAL at 20:58

## 2025-07-26 RX ADMIN — SODIUM CHLORIDE 58 ML: 9 INJECTION, SOLUTION INTRAVENOUS at 11:36

## 2025-07-26 RX ADMIN — SODIUM CHLORIDE: 9 INJECTION, SOLUTION INTRAVENOUS at 15:44

## 2025-07-26 RX ADMIN — PROPOFOL 40 MG: 10 INJECTION, EMULSION INTRAVENOUS at 09:28

## 2025-07-26 RX ADMIN — IOPAMIDOL 73 ML: 755 INJECTION, SOLUTION INTRAVENOUS at 11:36

## 2025-07-26 RX ADMIN — SODIUM CHLORIDE: 9 INJECTION, SOLUTION INTRAVENOUS at 04:31

## 2025-07-26 RX ADMIN — PROPOFOL 120 MCG/KG/MIN: 10 INJECTION, EMULSION INTRAVENOUS at 09:28

## 2025-07-26 RX ADMIN — PROPOFOL 10 MG: 10 INJECTION, EMULSION INTRAVENOUS at 09:35

## 2025-07-26 RX ADMIN — PROPOFOL 20 MG: 10 INJECTION, EMULSION INTRAVENOUS at 09:33

## 2025-07-26 ASSESSMENT — ACTIVITIES OF DAILY LIVING (ADL)
ADLS_ACUITY_SCORE: 29

## 2025-07-26 NOTE — PROGRESS NOTES
Hospitalist Medicine Progress Note   [facility]       Pamela Engle is a 74 year old lady well-known to me who comes in with past medical history of aortic stenosis being worked up for TAVR, while working up for TAVR a coronary angiogram showed single-vessel disease in RCA which was stented 5/28/2025 following which patient is on aspirin and Brilinta.  She also had a robot assisted laparoscopic cholecystectomy with intraoperative cholangiogram by Dr. Chasidy Swift on 7/20/2025 came into [facility] with 2 days of melena, dizziness and shortness of breath after getting up from a large bowel movement which was black in color her hemoglobin decreasing from 10.5 to 9.  An upper GI endoscopy did not show any signs of or site of bleeding.  Subsequently patient's hemoglobin decreased further 9 at admission to 5.8 for which she was transfused with 2 units of RBCs.  A colonoscopy performed 7/26/2025 did not show any source or signs of bleeding.  Subsequently a CT scan of the abdomen did not show any site of bleeding.  Plan is to prep 7/27/2025 for possible capsule endoscopy on 7/28/2025.  Hemoglobin will be monitored closely.        Date of Admission:  7/25/2025  Assessment & Plan       Acute blood loss anemia  Postural changes  Syncope probably secondary to blood loss anemia  Patient came in with 2 days of melena.   Is on treatment with aspirin and Brilinta status post PCI done 5/28/2025  Start Protonix IV  Upper GI endoscopy did not show any sources of bleeding  Patient is going to have colonoscopy 7/26/2025 and is getting prep for that  Structural heart coordinator recommended that cardiology be consulted and to restart aspirin 81 mg as soon as possible and when stable to restart Plavix with a loading dose once bleeding is controlled     Abnormal liver functions  The liver function tests are decreasing as compared to recently     Aortic stenosis  Plan to do TAVR     Coronary artery  "disease  Single-vessel in RCA s/p RILEY stenting 5/28/2025  Hold off aspirin and Plavix at this time  As per cardiology recommendations - \"Once blood counts have stabilized and no evidence of continued life-threatening bleed I would resume baby aspirin and Plavix rather than Brilinta with no loading dose\".      Recent cholecystectomy for acute cholecystitis  No signs of bleeding in the abdomen     GERD now on PPI Protonix            Plan:   Check hemoglobin at 6 PM today -transfuse if the hemoglobin is less than 7  Check CBC in a.m.  Liquid diet today  Plan to give GI prep for capsule endoscopy tomorrow    Diet: Full Liquid Diet    DVT Prophylaxis: Pneumatic Compression Devices  Duran Catheter: Not present  Code Status: Full Code         Medically Ready for Discharge: Anticipated in 2-4 Days    Clinically Significant Risk Factors          # Hyperchloremia: Highest Cl = 112 mmol/L in last 2 days, will monitor as appropriate          # Hypoalbuminemia: Lowest albumin = 3 g/dL at 7/26/2025  6:25 AM, will monitor as appropriate                # Overweight: Estimated body mass index is 29.99 kg/m  as calculated from the following:    Height as of 7/18/25: 1.486 m (4' 10.5\").    Weight as of this encounter: 66.2 kg (146 lb)., PRESENT ON ADMISSION                  The patient's care was discussed with the Patient, patient's son, gastroenterology, RN. .    Patrice Johnson MD  Hospitalist Service  [facility]    ______________________________________________________________________    Interval History     Symptoms   Patient denies any bleeding  Her hemoglobin had decreased today as mentioned above    Review of Systems:   No chest pain    Data reviewed today: I reviewed all medications, new labs and imaging results over the last 24 hours.     Physical Exam   Vital Signs: Temp: 98.9  F (37.2  C) Temp src: Oral BP: 131/63 Pulse: 64   Resp: 16 SpO2: 97 % O2 Device: None (Room air) Oxygen Delivery: 5 " LPM  Weight: 146 lbs 0 oz      General - Alert and oriented to time place and person in no acute distress  Eyes - No scleral icterus  HEENT - Neck supple, moist mucous membranes  Cardiovascular - regular  Extremities - There is no edema  Respiratory - clear  Skin - No pallor or cyanosis  Gastrointestinal - soft  Psych - Appropriate affect   Neurological - No gross motor neurological focal deficits    Data   Recent Labs   Lab 07/26/25  1002 07/26/25  0625 07/25/25  1330 07/25/25  1239 07/25/25  1138 07/21/25  0439   WBC  --  8.3  --   --  14.8* 6.7   HGB  --  5.8* 7.7*  --  9.0* 10.5*   MCV  --  90 90  --  87 88   PLT  --  301  --   --  527* 227   INR  --   --   --  1.00  --   --    NA  --  145  --   --  135 138   POTASSIUM  --  3.5  --   --  4.7 3.7   CHLORIDE  --  112*  --   --  101 104   CO2  --  22  --   --  20* 22   BUN  --  20.1  --   --  40.6* 13.0   CR  --  0.48*  --   --  0.60 0.58   ANIONGAP  --  11  --   --  14 12   HUGO  --  7.6*  --   --  9.2 8.5*   GLC 98 99  --   --  137* 109*   ALBUMIN  --  3.0*  --   --  4.2 3.4*   PROTTOTAL  --  4.9*  --   --  7.1 5.8*   BILITOTAL  --  0.5  --   --  0.8 1.3*   ALKPHOS  --  163*  --   --  238* 208*   ALT  --  111*  --   --  168* 239*   AST  --  67*  --   --  94* 138*   LIPASE  --   --   --   --  27  --          Recent Results (from the past 24 hours)   CT Abdomen Pelvis w Contrast    Narrative    EXAM: CT ABDOMEN PELVIS W CONTRAST  LOCATION: [facility]  DATE: 7/26/2025    INDICATION: Anemia, status post edel 7 days ago, negative EGD and colon.  COMPARISON: MRI 7/18/2025 and CT 7/18/2025.  TECHNIQUE: CT scan of the abdomen and pelvis was performed following injection of IV contrast. Multiplanar reformats were obtained. Dose reduction techniques were used.  CONTRAST: 73 mL Isovue 370    FINDINGS:   LOWER CHEST: Normal.    HEPATOBILIARY: Cholecystectomy. No complications visualized. The liver is unremarkable. No biliary dilatation.    PANCREAS:  Normal.    SPLEEN: Normal.    ADRENAL GLANDS: Normal.    KIDNEYS/BLADDER: 7 x 5 mm nonobstructing stone left mid kidney. 2 mm nonobstructing stone right mid kidney. No ureteral stones or hydronephrosis. Subcentimeter cortical cysts left kidney require no additional follow-up. The urinary bladder is grossly   unremarkable.    BOWEL: There are a few colonic diverticula without evidence for diverticulitis.    LYMPH NODES: Normal.    VASCULATURE: Normal.    PELVIC ORGANS: Normal.    MUSCULOSKELETAL: Surgical changes lumbar spine.      Impression    IMPRESSION:   1.  Surgical changes of a recent cholecystectomy. No complications visualized.  2.  Bilateral nonobstructing renal calculi.  3.  There are a few colonic diverticula without diverticulitis.  4.  No lymphadenopathy.

## 2025-07-26 NOTE — ANESTHESIA POSTPROCEDURE EVALUATION
Patient: Pamela Engle    Procedure: Procedure(s):  COLONOSCOPY       Anesthesia Type:  No value filed.    Note:  Disposition: Inpatient   Postop Pain Control: Uneventful            Sign Out: Well controlled pain   PONV: No   Neuro/Psych: Uneventful            Sign Out: Acceptable/Baseline neuro status   Airway/Respiratory: Uneventful            Sign Out: Acceptable/Baseline resp. status   CV/Hemodynamics: Uneventful            Sign Out: Acceptable CV status; No obvious hypovolemia; No obvious fluid overload   Other NRE: NONE   DID A NON-ROUTINE EVENT OCCUR? No           Last vitals:  Vitals Value Taken Time   /63 07/26/25 15:27   Temp 98.9  F (37.2  C) 07/26/25 15:27   Pulse 64 07/26/25 14:43   Resp 16 07/26/25 15:27   SpO2 97 % 07/26/25 15:27       Electronically Signed By: Selwyn Tucker MD  July 26, 2025  4:07 PM

## 2025-07-26 NOTE — PHARMACY-ADMISSION MEDICATION HISTORY
Pharmacy Intern Admission Medication History    Admission medication history is complete. The information provided in this note is only as accurate as the sources available at the time of the update.    Information Source(s): Patient via in-person    Pertinent Information: Patient stopped taking metoprolol in Late June due to them starting many medications after her stent was placed - stating that she does not want to be on too many medications.    Changes made to PTA medication list:  Added: Meloxicam, Lidocaine patch  Deleted:   Metoprolol tartrate 25 mg tablet - 0.5 tablet twice daily  Ondansetron 4 mg ODT tablet every 6 hours as needed (nausea, vomiting)  Changed: None    Allergies reviewed with patient and updates made in EHR: yes    Medication History Completed By: Karla Degroot 7/25/2025 7:42 PM    PTA Med List   Medication Sig Last Dose/Taking    acetaminophen (TYLENOL) 500 MG tablet Take 1,000 mg by mouth every 8 hours as needed for mild pain. Past Month    alendronate (FOSAMAX) 70 MG tablet TAKE 1 TABLET (70 MG TOTAL) BY MOUTH EVERY 7 DAYS. 7/21/2025    aspirin 81 MG EC tablet Take 1 tablet (81 mg) by mouth daily. Start tomorrow. 7/24/2025 Morning    atorvastatin (LIPITOR) 80 MG tablet Take 1 tablet (80 mg) by mouth daily. 7/24/2025 Evening    Calcium-Magnesium-Zinc (HUGO-MAG-ZINC OR) Take 500 mg by mouth 2 times daily 7/24/2025 Evening    Cholecalciferol (VITAMIN D3) 50 MCG (2000 UT) CAPS Take 2,000 Units by mouth every other day. 7/23/2025    Lidocaine (LIDOCARE) 4 % Patch Place 1 patch onto the skin daily as needed (arthritis pain). To prevent lidocaine toxicity, patient should be patch free for 12 hrs daily. 7/24/2025 Evening    meloxicam (MOBIC) 7.5 MG tablet Take 7.5 mg by mouth daily as needed (arthritis pain). Past Month    Multiple Vitamins-Minerals (HAIR SKIN AND NAILS FORMULA) TABS Take 1 tablet by mouth daily. 7/24/2025 Evening    Multiple Vitamins-Minerals (MULTIVITAMIN PO) Take 1 tablet by  mouth daily. 7/24/2025 Evening    ticagrelor (BRILINTA) 90 MG tablet Take 90 mg by mouth 2 times daily. 7/24/2025 Bedtime    valACYclovir (VALTREX) 1000 mg tablet TAKE TWO TABLETS BY MOUTH TWICE A DAY FOR ONE DAY AS NEEDED FOR COLD SORES More than a month

## 2025-07-26 NOTE — PLAN OF CARE
"Goal Outcome Evaluation:      Plan of Care Reviewed With: patient, child    Overall Patient Progress: no changeOverall Patient Progress: no change    Outcome Evaluation: Admitted to floor    To Do:  End of Shift Summary  For vital signs and complete assessments, please see documentation flowsheets.     Pertinent assessments: VSS. Denied Pain. Oriented x4. Skin intact other than lap sites from previous surgery. Denied SOB, Denied Lightheadedness/dizziness. Ambulating IND in room. 2 bm's since starting prep. Tele initiated.  Major Shift Events Admitted to floor  Treatment Plan: Colonoscopy. Monitor for signs of bleeding.   Bedside Nurse: Cezar Jules RN     Problem: Adult Inpatient Plan of Care  Goal: Plan of Care Review  Description: The Plan of Care Review/Shift note should be completed every shift.  The Outcome Evaluation is a brief statement about your assessment that the patient is improving, declining, or no change.  This information will be displayed automatically on your shift  note.  Outcome: Not Progressing  Flowsheets (Taken 7/25/2025 1931)  Outcome Evaluation: Admitted to floor  Plan of Care Reviewed With:   patient   child  Overall Patient Progress: no change  Goal: Patient-Specific Goal (Individualized)  Description: You can add care plan individualizations to a care plan. Examples of Individualization might be:  \"Parent requests to be called daily at 9am for status\", \"I have a hard time hearing out of my right ear\", or \"Do not touch me to wake me up as it startles  me\".  Outcome: Not Progressing  Goal: Absence of Hospital-Acquired Illness or Injury  Outcome: Not Progressing  Intervention: Identify and Manage Fall Risk  Recent Flowsheet Documentation  Taken 7/25/2025 1702 by Cezar Jules RN  Safety Promotion/Fall Prevention: safety round/check completed  Intervention: Prevent Skin Injury  Recent Flowsheet Documentation  Taken 7/25/2025 1702 by Cezar Jules RN  Body Position: position changed " independently  Goal: Optimal Comfort and Wellbeing  Outcome: Not Progressing  Goal: Readiness for Transition of Care  Outcome: Not Progressing  Intervention: Mutually Develop Transition Plan  Recent Flowsheet Documentation  Taken 7/25/2025 1600 by Cezar Jules RN  Equipment Currently Used at Home: none

## 2025-07-26 NOTE — ANESTHESIA CARE TRANSFER NOTE
Patient: Pamela Engle    Procedure: Procedure(s):  COLONOSCOPY       Diagnosis: Anemia due to blood loss, acute [D62]  Diagnosis Additional Information: No value filed.    Anesthesia Type:   No value filed.     Note:    Oropharynx: oropharynx clear of all foreign objects and spontaneously breathing  Level of Consciousness: drowsy  Oxygen Supplementation: face mask  Level of Supplemental Oxygen (L/min / FiO2): 5  Independent Airway: airway patency satisfactory and stable  Dentition: dentition unchanged  Vital Signs Stable: post-procedure vital signs reviewed and stable  Report to RN Given: handoff report given  Patient transferred to: PACU    Handoff Report: Identifed the Patient, Identified the Reponsible Provider, Reviewed the pertinent medical history, Discussed the surgical course, Reviewed Intra-OP anesthesia mangement and issues during anesthesia, Set expectations for post-procedure period and Allowed opportunity for questions and acknowledgement of understanding      Vitals:  Vitals Value Taken Time   BP     Temp     Pulse     Resp     SpO2 100 % 07/26/25 09:49   Vitals shown include unfiled device data.    Electronically Signed By: Dean Dennis Severson, APRN CRNA  July 26, 2025  9:50 AM

## 2025-07-26 NOTE — PROGRESS NOTES
[facility] Brief GI Note    Colonoscopy completed today - normal, no new or old blood.  Light yellow fluid in colon and TI, no evidence of GI bleeding  EGD normal yesterday.    Small bowel bleeding possible but not active at this time given clear fluid in TI.    -CT scan given recent cholecystectomy and severe anemia  - if negative can start clear liquids    Sarah Drew MD  [facility]  [phone]

## 2025-07-26 NOTE — CONSULTS
[facility]    Cardiology Consultation     Date of Admission:  7/25/2025    Assessment & Plan   Pamela Engle is a 74 year old female who was admitted on 7/25/2025.    #1 coronary artery disease with recent PCI and drug-eluting stent placement to her right coronary artery-this is in preparation for probable TAVR procedure for severe aortic stenosis.  Her last echocardiogram in May suggested a mean gradient across the aortic valve of 27 and a calculated valve area of 0.93 indicating more moderate to severe aortic stenosis.  Due to active blood loss with severe anemia her dual antiplatelet therapy has been withheld.  I would recommend to continue to hold her DAPT for at least the next 24 to 48 hours until serial hemoglobin measurements demonstrate stable blood counts.  There is a risk of course with subacute thrombosis of her stent with withholding DAPT but due to life-threatening bleeding I do not see any other option at this point.  A CT of the abdomen is being performed and is pending at this time to look for any evidence of surgical bleeding.  By chart it sounds like there was probable GI bleeding initially, and the colonoscopy suggest that this may have subsided.  I would discuss with GI whether or not to place on IV proton pump inhibitor but I would argue for this given no evidence of source of bleeding and possible peptic ulcer disease.  Patient is instructed to contact nursing staff immediately if she has any sort of chest discomfort.  Once blood counts have stabilized and no evidence of continued life-threatening bleed I would resume baby aspirin and Plavix rather than Brilinta with no loading dose.  I would also send home on PPI    Please feel free to contact me with any questions you have regards to her care    Moderate complexity     Amanda Georgie Ortiz DO, MD    Primary Care Physician   Maxine Bonner    Reason for Consult   Reason for consult: I was asked by sindy to evaluate  this patient for recent PCI with acute blood loss.    History of Present Illness    74 year old female who comes in with past medical history of aortic stenosis being worked up for TAVR, while working up for TAVR a coronary angiogram showed single-vessel disease in RCA which was stented 5/28/2025 following which patient is on aspirin and Brilinta.  She had abdominal pain a week ago and was diagnosed with acute cholecystitis.  After consultation with her cardiologist and surgeon she proceeded with laparoscopic cholecystectomy.  She  had a robot assisted laparoscopic cholecystectomy with intraoperative cholangiogram by Dr. Chasidy Swift on 7/20/2025.      For the last 2 to 3 days patient has had increased heart rate when usually her heart rate is around 60s it increased 200s with blood pressure relatively lower 90/80 until yesterday when she started having black-colored stools.  On the day of admission to [facility] 7/25/2025 patient got up at 5:30 AM in the morning to go to the bathroom where she passed out after having a black-colored stool following which walking for her was very tiring and she had shortness of breath because of which she came to the hospital for further evaluation.  She subsequently has undergone EGD and colonoscopy without evidence of bleeding source.  The GI doctor noted that yellow fluid was coming from colon indicating low likelihood of active small intestinal bleeding.  However her hemoglobin continues to drop from admission 9.0 down to 5.8 this morning.  She has received 1 blood transfusion and is on her second.  She denies any active chest discomfort or difficulty breathing at this time.      Past Medical History   Past Medical History:   Diagnosis Date    Anemia 2012    after surgery    Aortic stenosis     cardiology visit follow up due Fall of 2021    Arthritis     Complication of anesthesia     Gastroesophageal reflux disease     rarely    Hepatitis     hepatitis A as a child     History of anesthesia complications     Tremors after surgery     Primary insomnia 10/5/2020         Past Surgical History   Past Surgical History:   Procedure Laterality Date    ARTHROSCOPY KNEE Left 2003    ARTHROSCOPY KNEE Right 2010    BIOPSY  2001    bone marrow donator    BUNIONECTOMY PORTER Bilateral 1990's    and hammer toe surgery    CV CORONARY ANGIOGRAM N/A 5/28/2025    Procedure: Coronary Angiogram;  Surgeon: Norris Maguire MD;  Location: [facility] CATH LAB CV    CV LEFT HEART CATH N/A 5/28/2025    Procedure: Left Heart Catheterization;  Surgeon: Norris Maguire MD;  Location: [facility] CATH LAB CV    CV PCI STENT DRUG ELUTING N/A 5/28/2025    Procedure: Percutaneous Coronary Intervention Stent;  Surgeon: Norris Maguire MD;  Location: [facility] CATH LAB CV    DAVINCI LAPAROSCOPIC CHOLECYSTECTOMY WITH GRAMS N/A 7/20/2025    Procedure: CHOLECYSTECTOMY, ROBOT-ASSISTED, LAPAROSCOPIC, WITH CHOLANGIOGRAM;  Surgeon: Sharmaine Swift MD;  Location: [facility] OR    ENT SURGERY  1950's    EXCISE MASS TRUNK N/A 11/5/2019    Procedure: EXCISION SUBCUTANEOUS MASS UPPER CHEST MIDLINE;  Surgeon: Karla Vasquez MD;  Location: [facility] OR    EXCISE CASTANO'S NEUROMA FOOT  2007 and  redo 2009    GYN SURGERY  1984    tubal ligation    ORTHOPEDIC SURGERY Right 01/2019    rotator cuff repair    SOFT TISSUE SURGERY  2012    lipoma removed from back         Prior to Admission Medications   Prior to Admission Medications   Prescriptions Last Dose Informant Patient Reported? Taking?   Calcium-Magnesium-Zinc (HUGO-MAG-ZINC OR) 7/24/2025 Evening  Yes Yes   Sig: Take 500 mg by mouth 2 times daily   Cholecalciferol (VITAMIN D3) 50 MCG (2000 UT) CAPS 7/23/2025  Yes Yes   Sig: Take 2,000 Units by mouth every other day.   Lidocaine (LIDOCARE) 4 % Patch 7/24/2025 Evening  Yes Yes   Sig: Place 1 patch onto the skin daily as needed (arthritis pain). To prevent lidocaine toxicity, patient should be patch free for 12 hrs daily.   Multiple  Vitamins-Minerals (HAIR SKIN AND NAILS FORMULA) TABS 7/24/2025 Evening  Yes Yes   Sig: Take 1 tablet by mouth daily.   Multiple Vitamins-Minerals (MULTIVITAMIN PO) 7/24/2025 Evening  Yes Yes   Sig: Take 1 tablet by mouth daily.   acetaminophen (TYLENOL) 500 MG tablet Past Month  Yes Yes   Sig: Take 1,000 mg by mouth every 8 hours as needed for mild pain.   alendronate (FOSAMAX) 70 MG tablet 7/21/2025  No Yes   Sig: TAKE 1 TABLET (70 MG TOTAL) BY MOUTH EVERY 7 DAYS.   aspirin 81 MG EC tablet 7/24/2025 Morning  No Yes   Sig: Take 1 tablet (81 mg) by mouth daily. Start tomorrow.   atorvastatin (LIPITOR) 80 MG tablet 7/24/2025 Evening  No Yes   Sig: Take 1 tablet (80 mg) by mouth daily.   clopidogrel (PLAVIX) 75 MG tablet   No No   Sig: Finish Brilinta.Then start, Clopidogrel 600 mg (8 tabs) by mouth once. Next morning, Clopidogrel 75 mg by mouth once daily.   meloxicam (MOBIC) 7.5 MG tablet Past Month  Yes Yes   Sig: Take 7.5 mg by mouth daily as needed (arthritis pain).   ticagrelor (BRILINTA) 90 MG tablet 7/24/2025 Bedtime  Yes Yes   Sig: Take 90 mg by mouth 2 times daily.   valACYclovir (VALTREX) 1000 mg tablet More than a month  No Yes   Sig: TAKE TWO TABLETS BY MOUTH TWICE A DAY FOR ONE DAY AS NEEDED FOR COLD SORES      Facility-Administered Medications: None     Current Facility-Administered Medications   Medication Dose Route Frequency Provider Last Rate Last Admin    acetaminophen (TYLENOL) tablet 650 mg  650 mg Oral Q4H PRN Patrice Johnson MD        Or    acetaminophen (TYLENOL) Suppository 650 mg  650 mg Rectal Q4H PRN Patrice Johnson MD        atorvastatin (LIPITOR) tablet 80 mg  80 mg Oral Daily Patrice Johnson MD        calcium carbonate (TUMS) chewable tablet 1,000 mg  1,000 mg Oral 4x Daily PRN Patrice Johnson MD        lidocaine (LMX4) cream   Topical Q1H PRN Patrice Johnson MD        lidocaine 1 % 0.1-1 mL  0.1-1 mL Other Q1H PRN Patrice Johnson MD        [Held by provider] metoprolol tartrate (LOPRESSOR) half-tab  12.5 mg  12.5 mg Oral BID Amee, Medhat A, DO        naloxone (NARCAN) injection 0.2 mg  0.2 mg Intravenous Q2 Min PRStefanie Altamirano MD        naloxone (NARCAN) injection 0.2 mg  0.2 mg Intramuscular Q2 Min Stefanie Geiger MD        naloxone (NARCAN) injection 0.4 mg  0.4 mg Intravenous Q2 Min PRStefanie Altamirano MD        naloxone (NARCAN) injection 0.4 mg  0.4 mg Intramuscular Q2 Min PRStefanie Altamirano MD        senna-docusate (SENOKOT-S/PERICOLACE) 8.6-50 MG per tablet 1 tablet  1 tablet Oral BID PRN Patrice Johnson MD        Or    senna-docusate (SENOKOT-S/PERICOLACE) 8.6-50 MG per tablet 2 tablet  2 tablet Oral BID PRN Patrice Johnson MD        sodium chloride (PF) 0.9% PF flush 3 mL  3 mL Intracatheter Q8H [facility] Patrice Johnson MD        sodium chloride (PF) 0.9% PF flush 3 mL  3 mL Intracatheter q1 min prn Patrice Johnson MD        sodium chloride 0.9 % infusion   Intravenous Continuous Patrice Johnson MD   Stopped at 07/26/25 0900     Current Facility-Administered Medications   Medication Dose Route Frequency Provider Last Rate Last Admin    acetaminophen (TYLENOL) tablet 650 mg  650 mg Oral Q4H PRN Patrice Johnson MD        Or    acetaminophen (TYLENOL) Suppository 650 mg  650 mg Rectal Q4H PRN Patrice Johnson MD        atorvastatin (LIPITOR) tablet 80 mg  80 mg Oral Daily Patrice Johnson MD        calcium carbonate (TUMS) chewable tablet 1,000 mg  1,000 mg Oral 4x Daily PRN Patrice Johnson MD        lidocaine (LMX4) cream   Topical Q1H PRN Patrice Johnson MD        lidocaine 1 % 0.1-1 mL  0.1-1 mL Other Q1H PRN Patrice Johnson MD        [Held by provider] metoprolol tartrate (LOPRESSOR) half-tab 12.5 mg  12.5 mg Oral BID Amee, Medhat A, DO        naloxone (NARCAN) injection 0.2 mg  0.2 mg Intravenous Q2 Min PRN Stefaine Steen MD        naloxone (NARCAN) injection 0.2 mg  0.2 mg Intramuscular Q2 Min PRN Stefanie Steen MD        naloxone (NARCAN) injection 0.4 mg  0.4 mg Intravenous Q2  Min Stefanie Geiger MD        naloxone (NARCAN) injection 0.4 mg  0.4 mg Intramuscular Q2 Min PRN Stefanie Steen MD        senna-docusate (SENOKOT-S/PERICOLACE) 8.6-50 MG per tablet 1 tablet  1 tablet Oral BID PRN Patrice Johnson MD        Or    senna-docusate (SENOKOT-S/PERICOLACE) 8.6-50 MG per tablet 2 tablet  2 tablet Oral BID PRN Patrice Johnson MD        sodium chloride (PF) 0.9% PF flush 3 mL  3 mL Intracatheter Q8H SHAYAN Patrice Johnson MD        sodium chloride (PF) 0.9% PF flush 3 mL  3 mL Intracatheter q1 min prn Patrice Johnson MD        sodium chloride 0.9 % infusion   Intravenous Continuous Patrice Johnson MD   Stopped at 07/26/25 0900     Allergies   Allergies   Allergen Reactions    Hydrocodone Swelling       Social History    reports that she quit smoking about 15 years ago. Her smoking use included cigarettes. She started smoking about 56 years ago. She has a 41.1 pack-year smoking history. She has never used smokeless tobacco. She reports current alcohol use of about 2.0 standard drinks of alcohol per week. She reports that she does not use drugs.      Family History   I have reviewed this patient's family history and updated it with pertinent information if needed.  Family History   Problem Relation Age of Onset    CABG Mother 77    Arthritis Mother     Kidney Disease Mother     Pacemaker Mother     Colon Cancer Father     Hypertension Father     Hyperlipidemia Father     Prostate Cancer Father     Dementia Father     CABG Father     CABG Sister 58    Diabetes Sister     Coronary Stenting Sister 63        2 stents    Substance Abuse Brother     Myocardial Infarction Brother     CABG Brother     Liver Cancer Maternal Grandmother     Breast Cancer Paternal Grandmother     Lung Cancer Paternal Grandfather           Review of Systems   A comprehensive review of system was performed and is negative other than that noted in the HPI or here.     Physical Exam   Vital Signs with Ranges  Temp:  [97  " F (36.1  C)-99.1  F (37.3  C)] 98.6  F (37  C)  Pulse:  [66-96] 71  Resp:  [15-18] 16  BP: ()/(47-76) 117/56  SpO2:  [70 %-100 %] 98 %  Wt Readings from Last 4 Encounters:   07/25/25 66.2 kg (146 lb)   07/18/25 68.9 kg (151 lb 12.8 oz)   07/18/25 68 kg (150 lb)   07/18/25 68 kg (150 lb)     I/O last 3 completed shifts:  In: 100 [I.V.:100]  Out: -       Vitals: /56 (BP Location: Right arm)   Pulse 71   Temp 98.6  F (37  C) (Oral)   Resp 16   Wt 66.2 kg (146 lb)   SpO2 98%   BMI 29.99 kg/m      Physical Exam:   General - Alert and oriented to time place and person in no acute distress  Eyes - No scleral icterus  HEENT - Neck supple, moist mucous membranes  Cardiovascular - regular  Extremities - There is no edema  Respiratory - clear  Skin - No pallor or cyanosis  Gastrointestinal - soft  Psych - Appropriate affect   Neurological - No gross motor neurological focal deficits    No lab results found in last 7 days.    Invalid input(s): \"TROPONINIES\"    Recent Labs   Lab 07/26/25  1002 07/26/25  0625 07/25/25  1330 07/25/25  1239 07/25/25  1138 07/21/25  0439   WBC  --  8.3  --   --  14.8* 6.7   HGB  --  5.8* 7.7*  --  9.0* 10.5*   MCV  --  90 90  --  87 88   PLT  --  301  --   --  527* 227   INR  --   --   --  1.00  --   --    NA  --  145  --   --  135 138   POTASSIUM  --  3.5  --   --  4.7 3.7   CHLORIDE  --  112*  --   --  101 104   CO2  --  22  --   --  20* 22   BUN  --  20.1  --   --  40.6* 13.0   CR  --  0.48*  --   --  0.60 0.58   GFRESTIMATED  --  >90  --   --  >90 >90   ANIONGAP  --  11  --   --  14 12   HUGO  --  7.6*  --   --  9.2 8.5*   GLC 98 99  --   --  137* 109*   ALBUMIN  --  3.0*  --   --  4.2 3.4*   PROTTOTAL  --  4.9*  --   --  7.1 5.8*   BILITOTAL  --  0.5  --   --  0.8 1.3*   ALKPHOS  --  163*  --   --  238* 208*   ALT  --  111*  --   --  168* 239*   AST  --  67*  --   --  94* 138*   LIPASE  --   --   --   --  27  --      Recent Labs   Lab Test 06/24/25  1116 09/12/24  1127   CHOL " "146 202*   HDL 71 68   LDL 59 111*   TRIG 81 113     Recent Labs   Lab 07/26/25  0625 07/25/25  1330 07/25/25  1138 07/21/25  0439   WBC 8.3  --  14.8* 6.7   HGB 5.8* 7.7* 9.0* 10.5*   HCT 17.8*  --  26.4* 31.8*   MCV 90 90 87 88     --  527* 227     No results for input(s): \"PH\", \"PHV\", \"PO2\", \"PO2V\", \"SAT\", \"PCO2\", \"PCO2V\", \"HCO3\", \"HCO3V\" in the last 168 hours.  No results for input(s): \"NTBNPI\", \"NTBNP\" in the last 168 hours.  No results for input(s): \"DD\" in the last 168 hours.  No results for input(s): \"SED\", \"CRP\" in the last 168 hours.  Recent Labs   Lab 07/26/25  0625 07/25/25  1138 07/21/25  0439    527* 227     No results for input(s): \"TSH\" in the last 168 hours.  No results for input(s): \"COLOR\", \"APPEARANCE\", \"URINEGLC\", \"URINEBILI\", \"URINEKETONE\", \"SG\", \"UBLD\", \"URINEPH\", \"PROTEIN\", \"UROBILINOGEN\", \"NITRITE\", \"LEUKEST\", \"RBCU\", \"WBCU\" in the last 168 hours.    Imaging:  Recent Results (from the past 48 hours)   Chest XR,  PA & LAT    Narrative    EXAM: XR CHEST 2 VIEWS  LOCATION: [facility]  DATE: 7/25/2025    INDICATION: syncope, chest discomfort  COMPARISON: 7/18/2025.      Impression    IMPRESSION: Negative chest. Coronary artery stent.       Echo:  No results found for this or any previous visit (from the past 4320 hours).    Clinically Significant Risk Factors Present on Admission          # Hyperchloremia: Highest Cl = 112 mmol/L in last 2 days, will monitor as appropriate          # Hypoalbuminemia: Lowest albumin = 3 g/dL at 7/26/2025  6:25 AM, will monitor as appropriate   # Drug Induced Platelet Defect: home medication list includes an antiplatelet medication        # Anemia: based on hgb <11  # Anemia: based on hgb <11       # Overweight: Estimated body mass index is 29.99 kg/m  as calculated from the following:    Height as of 7/18/25: 1.486 m (4' 10.5\").    Weight as of this encounter: 66.2 kg (146 lb).                                                   "

## 2025-07-26 NOTE — ANESTHESIA PREPROCEDURE EVALUATION
Anesthesia Pre-Procedure Evaluation    Patient: Pamela Engle   MRN: [id number] : 1950          Procedure : Procedure(s):  COLONOSCOPY         Past Medical History:   Diagnosis Date    Anemia     after surgery    Aortic stenosis     cardiology visit follow up due Fall 2021    Arthritis     Complication of anesthesia     Gastroesophageal reflux disease     rarely    Hepatitis     hepatitis A as a child    History of anesthesia complications     Tremors after surgery     Primary insomnia 10/5/2020      Past Surgical History:   Procedure Laterality Date    ARTHROSCOPY KNEE Left     ARTHROSCOPY KNEE Right     BIOPSY      bone marrow donator    BUNIONECTOMY PORTER Bilateral     and hammer toe surgery    CV CORONARY ANGIOGRAM N/A 2025    Procedure: Coronary Angiogram;  Surgeon: Norris Maguire MD;  Location: [facility] CATH LAB CV    CV LEFT HEART CATH N/A 2025    Procedure: Left Heart Catheterization;  Surgeon: Norris Maguire MD;  Location: [facility] CATH LAB CV    CV PCI STENT DRUG ELUTING N/A 2025    Procedure: Percutaneous Coronary Intervention Stent;  Surgeon: Norris Maguire MD;  Location: [facility] CATH LAB CV    DAVINCI LAPAROSCOPIC CHOLECYSTECTOMY WITH GRAMS N/A 2025    Procedure: CHOLECYSTECTOMY, ROBOT-ASSISTED, LAPAROSCOPIC, WITH CHOLANGIOGRAM;  Surgeon: Sharmaine Swift MD;  Location: [facility] OR    ENT SURGERY  s    EXCISE MASS TRUNK N/A 2019    Procedure: EXCISION SUBCUTANEOUS MASS UPPER CHEST MIDLINE;  Surgeon: Karla Vasquez MD;  Location: [facility] OR    EXCISE CASTANO'S NEUROMA FOOT  2007 and  redo 2009    GYN SURGERY  1984    tubal ligation    ORTHOPEDIC SURGERY Right 2019    rotator cuff repair    SOFT TISSUE SURGERY      lipoma removed from back      Allergies   Allergen Reactions    Hydrocodone Swelling      Social History     Tobacco Use    Smoking status: Former     Current packs/day: 0.00     Average packs/day: 1 pack/day for  41.1 years (41.1 ttl pk-yrs)     Types: Cigarettes     Start date: 1/1/1969     Quit date: 2/1/2010     Years since quitting: 15.4    Smokeless tobacco: Never   Substance Use Topics    Alcohol use: Yes     Alcohol/week: 2.0 standard drinks of alcohol     Comment: 1-2 drinks per week      Wt Readings from Last 1 Encounters:   07/25/25 66.2 kg (146 lb)        Anesthesia Evaluation   Pt has had prior anesthetic.     No history of anesthetic complications       ROS/MED HX  ENT/Pulmonary:  - neg pulmonary ROS     Neurologic:  - neg neurologic ROS     Cardiovascular:     (+)  - -  CAD -  - -                           valvular problems/murmurs type: AS     Previous cardiac testing     METS/Exercise Tolerance:     Hematologic:     (+)      anemia,          Musculoskeletal:   (+)  arthritis,             GI/Hepatic:     (+) GERD,          hepatitis  liver disease,       Renal/Genitourinary:       Endo:     (+)               Obesity,       Psychiatric/Substance Use:  - neg psychiatric ROS     Infectious Disease:  - neg infectious disease ROS     Malignancy:       Other:              Physical Exam  Airway  Mallampati: II  TM distance: >3 FB  Neck ROM: full    Cardiovascular - normal exam   Dental   (+) Modest Abnormalities - crowns, retainers, 1 or 2 missing teeth      Pulmonary - normal exam      Neurological - normal exam  She appears awake, alert and oriented x3.    Other Findings       OUTSIDE LABS:  CBC:   Lab Results   Component Value Date    WBC 8.3 07/26/2025    WBC 14.8 (H) 07/25/2025    HGB 5.8 (LL) 07/26/2025    HGB 7.7 (L) 07/25/2025    HCT 17.8 (L) 07/26/2025    HCT 26.4 (L) 07/25/2025     07/26/2025     (H) 07/25/2025     BMP:   Lab Results   Component Value Date     07/26/2025     07/25/2025    POTASSIUM 3.5 07/26/2025    POTASSIUM 4.7 07/25/2025    CHLORIDE 112 (H) 07/26/2025    CHLORIDE 101 07/25/2025    CO2 22 07/26/2025    CO2 20 (L) 07/25/2025    BUN 20.1 07/26/2025    BUN 40.6 (H)  "07/25/2025    CR 0.48 (L) 07/26/2025    CR 0.60 07/25/2025    GLC 99 07/26/2025     (H) 07/25/2025     COAGS:   Lab Results   Component Value Date    INR 1.00 07/25/2025     POC: No results found for: \"BGM\", \"HCG\", \"HCGS\"  HEPATIC:   Lab Results   Component Value Date    ALBUMIN 3.0 (L) 07/26/2025    PROTTOTAL 4.9 (L) 07/26/2025     (H) 07/26/2025    AST 67 (H) 07/26/2025    ALKPHOS 163 (H) 07/26/2025    BILITOTAL 0.5 07/26/2025     OTHER:   Lab Results   Component Value Date    LACT 1.0 07/18/2025    A1C 5.4 09/12/2024    HUGO 7.6 (L) 07/26/2025    MAG 2.4 (H) 07/18/2022    LIPASE 27 07/25/2025    AMYLASE 55 07/21/2025    TSH 1.98 09/12/2024       Anesthesia Plan    ASA Status:  4       Anesthesia Type:.  Induction: intravenous.   Techniques and Equipment:       - Monitoring Plan: standard ASA monitoring     Consents    Anesthesia Plan(s) and associated risks, benefits, and realistic alternatives discussed. Questions answered and patient/representative(s) expressed understanding.     - Discussed: anesthesiologist     - Discussed with:  Patient               Postoperative Care    Pain management: multimodal analgesia.     Comments:                   Selwyn Tucker MD    I have reviewed the pertinent notes and labs in the chart from the past 30 days and (re)examined the patient.  Any updates or changes from those notes are reflected in this note.    Clinically Significant Risk Factors Present on Admission          # Hyperchloremia: Highest Cl = 112 mmol/L in last 2 days, will monitor as appropriate          # Hypoalbuminemia: Lowest albumin = 3 g/dL at 7/26/2025  6:25 AM, will monitor as appropriate   # Drug Induced Platelet Defect: home medication list includes an antiplatelet medication        # Anemia: based on hgb <11  # Anemia: based on hgb <11       # Overweight: Estimated body mass index is 29.99 kg/m  as calculated from the following:    Height as of 7/18/25: 1.486 m (4' 10.5\").    Weight as " of this encounter: 66.2 kg (146 lb).

## 2025-07-26 NOTE — PLAN OF CARE
"Goal Outcome Evaluation:    Goal Outcome Evaluation:     End of Shift Summary  For vital signs and complete assessments, please see documentation flowsheets.     Pertinent assessments: A&Ox4. VSS. Independent. Tele sinus rhythm in 70s. Denies pain, dizziness, nausea. Son at bedside.      Major Shift Events: hgb 5.8 this am, 2 units of blood transfused, Colonoscopy completed, CT completed. Cardiology consulted. Advanced to full liquid diet     Treatment Plan: monitor hgb and signs of bleeding      Bedside Nurse: Ame Levine RN        Plan of Care Reviewed With: patient, family     Overall Patient Progress: no changeOverall Patient Progress: no change     Outcome Evaluation: Hgb 5.8 this morning, colonoscopy completed, awating CT results  Problem: Adult Inpatient Plan of Care  Goal: Plan of Care Review  Description: The Plan of Care Review/Shift note should be completed every shift.  The Outcome Evaluation is a brief statement about your assessment that the patient is improving, declining, or no change.  This information will be displayed automatically on your shift  note.  7/26/2025 1435 by Ame Levine RN  Outcome: Not Progressing  Flowsheets (Taken 7/26/2025 1435)  Overall Patient Progress: no change  7/26/2025 1424 by Ame Levine RN  Outcome: Not Progressing  Flowsheets (Taken 7/26/2025 1423)  Outcome Evaluation: Hgb 5.8 this morning, colonoscopy completed, awating CT results  Plan of Care Reviewed With:   patient   family  Overall Patient Progress: no change  Goal: Patient-Specific Goal (Individualized)  Description: You can add care plan individualizations to a care plan. Examples of Individualization might be:  \"Parent requests to be called daily at 9am for status\", \"I have a hard time hearing out of my right ear\", or \"Do not touch me to wake me up as it startles  me\".  7/26/2025 1435 by Ame Levine RN  Outcome: Not Progressing  7/26/2025 1424 by Ame Levine RN  Outcome: " Not Progressing  Goal: Absence of Hospital-Acquired Illness or Injury  7/26/2025 1435 by Ame Levine RN  Outcome: Not Progressing  7/26/2025 1424 by Ame Levine RN  Outcome: Not Progressing  Intervention: Identify and Manage Fall Risk  Recent Flowsheet Documentation  Taken 7/26/2025 0800 by Ame Levine RN  Safety Promotion/Fall Prevention: safety round/check completed  Intervention: Prevent Skin Injury  Recent Flowsheet Documentation  Taken 7/26/2025 0800 by Ame Levine RN  Body Position: position changed independently  Intervention: Prevent and Manage VTE (Venous Thromboembolism) Risk  Recent Flowsheet Documentation  Taken 7/26/2025 0800 by Ame Levine RN  VTE Prevention/Management: SCDs off (sequential compression devices)  Intervention: Prevent Infection  Recent Flowsheet Documentation  Taken 7/26/2025 0800 by Ame Levine RN  Infection Prevention:   cohorting utilized   environmental surveillance performed   hand hygiene promoted   rest/sleep promoted  Goal: Optimal Comfort and Wellbeing  7/26/2025 1435 by Ame Levine RN  Outcome: Not Progressing  7/26/2025 1424 by Ame Levine RN  Outcome: Not Progressing  Goal: Readiness for Transition of Care  7/26/2025 1435 by Ame Levine RN  Outcome: Not Progressing  7/26/2025 1424 by Ame Levine RN  Outcome: Not Progressing     Problem: Delirium  Goal: Optimal Coping  7/26/2025 1435 by Ame Levine RN  Outcome: Not Progressing  7/26/2025 1424 by Ame Levine RN  Outcome: Not Progressing  Goal: Improved Behavioral Control  7/26/2025 1435 by Ame Levine RN  Outcome: Not Progressing  7/26/2025 1424 by Ame Levine RN  Outcome: Not Progressing  Intervention: Minimize Safety Risk  Recent Flowsheet Documentation  Taken 7/26/2025 0800 by Ame Levine RN  Enhanced Safety Measures: family to remain at bedside  Goal: Improved Attention and Thought  Clarity  7/26/2025 1435 by Ame Levine, RN  Outcome: Not Progressing  7/26/2025 1424 by Ame Levine, RN  Outcome: Not Progressing  Goal: Improved Sleep  7/26/2025 1435 by Ame Levine, RN  Outcome: Not Progressing  7/26/2025 1424 by Ame Levine, RN  Outcome: Not Progressing         Plan of Care Reviewed With: patient, family    Overall Patient Progress: no changeOverall Patient Progress: no change    Outcome Evaluation: Hgb 5.8 this morning, colonoscopy completed, awating CT results

## 2025-07-26 NOTE — PLAN OF CARE
"End of Shift Summary  For vital signs and complete assessments, please see documentation flowsheets.     Pertinent assessments: A&Ox4. VSS. Independent. Tele sinus tach HR in 100s. Denies pain, dizziness, nausea. Son at bedside overnight.     Major Shift Events: finished go bowel prep, pt reports that stools are clear, npo since midnight.     Treatment Plan: colonoscopy today, monitor hgb and sings of bleeding   Bedside Nurse: Meredith Giang RN     Goal Outcome Evaluation:      Plan of Care Reviewed With: patient, child    Overall Patient Progress: no changeOverall Patient Progress: no change    Outcome Evaluation: colonoscopy today    Problem: Adult Inpatient Plan of Care  Goal: Plan of Care Review  Description: The Plan of Care Review/Shift note should be completed every shift.  The Outcome Evaluation is a brief statement about your assessment that the patient is improving, declining, or no change.  This information will be displayed automatically on your shift  note.  Outcome: Progressing  Flowsheets (Taken 7/26/2025 7924)  Outcome Evaluation: colonoscopy today  Plan of Care Reviewed With:   patient   child  Overall Patient Progress: no change  Goal: Patient-Specific Goal (Individualized)  Description: You can add care plan individualizations to a care plan. Examples of Individualization might be:  \"Parent requests to be called daily at 9am for status\", \"I have a hard time hearing out of my right ear\", or \"Do not touch me to wake me up as it startles  me\".  Outcome: Progressing  Goal: Absence of Hospital-Acquired Illness or Injury  Outcome: Progressing  Intervention: Identify and Manage Fall Risk  Recent Flowsheet Documentation  Taken 7/25/2025 2216 by Meredith Giang RN  Safety Promotion/Fall Prevention: safety round/check completed  Intervention: Prevent Skin Injury  Recent Flowsheet Documentation  Taken 7/25/2025 2216 by Meredith Giang RN  Body Position: position changed independently  Goal: Optimal Comfort " and Wellbeing  Outcome: Progressing  Goal: Readiness for Transition of Care  Outcome: Progressing     Problem: Delirium  Goal: Optimal Coping  Outcome: Progressing  Goal: Improved Behavioral Control  Outcome: Progressing  Intervention: Minimize Safety Risk  Recent Flowsheet Documentation  Taken 7/25/2025 2216 by Meredith Giang RN  Enhanced Safety Measures: family to remain at bedside  Goal: Improved Attention and Thought Clarity  Outcome: Progressing  Goal: Improved Sleep  Outcome: Progressing

## 2025-07-27 ENCOUNTER — APPOINTMENT (OUTPATIENT)
Dept: ULTRASOUND IMAGING | Facility: CLINIC | Age: 75
End: 2025-07-27
Attending: INTERNAL MEDICINE
Payer: COMMERCIAL

## 2025-07-27 LAB
ALBUMIN SERPL BCG-MCNC: 3.8 G/DL (ref 3.5–5.2)
ALP SERPL-CCNC: 220 U/L (ref 40–150)
ALT SERPL W P-5'-P-CCNC: 112 U/L (ref 0–50)
AST SERPL W P-5'-P-CCNC: 67 U/L (ref 0–45)
BILIRUB DIRECT SERPL-MCNC: 0.4 MG/DL (ref 0–0.3)
BILIRUB SERPL-MCNC: 0.8 MG/DL
ERYTHROCYTE [DISTWIDTH] IN BLOOD BY AUTOMATED COUNT: 17.3 % (ref 10–15)
HCT VFR BLD AUTO: 28.7 % (ref 35–47)
HGB BLD-MCNC: 9.8 G/DL (ref 11.7–15.7)
MCH RBC QN AUTO: 29.1 PG (ref 26.5–33)
MCHC RBC AUTO-ENTMCNC: 34.1 G/DL (ref 31.5–36.5)
MCV RBC AUTO: 85 FL (ref 78–100)
PLATELET # BLD AUTO: 345 10E3/UL (ref 150–450)
PROT SERPL-MCNC: 6.1 G/DL (ref 6.4–8.3)
RBC # BLD AUTO: 3.37 10E6/UL (ref 3.8–5.2)
WBC # BLD AUTO: 11.9 10E3/UL (ref 4–11)

## 2025-07-27 PROCEDURE — 93971 EXTREMITY STUDY: CPT | Mod: LT

## 2025-07-27 PROCEDURE — 250N000013 HC RX MED GY IP 250 OP 250 PS 637: Performed by: INTERNAL MEDICINE

## 2025-07-27 PROCEDURE — 120N000001 HC R&B MED SURG/OB

## 2025-07-27 PROCEDURE — 999N000127 HC STATISTIC PERIPHERAL IV START W US GUIDANCE

## 2025-07-27 PROCEDURE — 80076 HEPATIC FUNCTION PANEL: CPT | Performed by: INTERNAL MEDICINE

## 2025-07-27 PROCEDURE — 258N000003 HC RX IP 258 OP 636: Performed by: INTERNAL MEDICINE

## 2025-07-27 PROCEDURE — 36415 COLL VENOUS BLD VENIPUNCTURE: CPT | Performed by: INTERNAL MEDICINE

## 2025-07-27 PROCEDURE — 99233 SBSQ HOSP IP/OBS HIGH 50: CPT | Performed by: INTERNAL MEDICINE

## 2025-07-27 PROCEDURE — 85027 COMPLETE CBC AUTOMATED: CPT | Performed by: INTERNAL MEDICINE

## 2025-07-27 RX ORDER — MAGNESIUM CARB/ALUMINUM HYDROX 105-160MG
296 TABLET,CHEWABLE ORAL ONCE
Status: COMPLETED | OUTPATIENT
Start: 2025-07-28 | End: 2025-07-28

## 2025-07-27 RX ORDER — PANTOPRAZOLE SODIUM 40 MG/1
40 TABLET, DELAYED RELEASE ORAL
Status: DISCONTINUED | OUTPATIENT
Start: 2025-07-27 | End: 2025-07-29 | Stop reason: HOSPADM

## 2025-07-27 RX ORDER — POLYETHYLENE GLYCOL 3350 17 G/17G
238 POWDER, FOR SOLUTION ORAL ONCE
Status: COMPLETED | OUTPATIENT
Start: 2025-07-27 | End: 2025-07-27

## 2025-07-27 RX ADMIN — PANTOPRAZOLE SODIUM 40 MG: 40 TABLET, DELAYED RELEASE ORAL at 10:21

## 2025-07-27 RX ADMIN — POLYETHYLENE GLYCOL 3350 238 G: 17 POWDER, FOR SOLUTION ORAL at 16:53

## 2025-07-27 RX ADMIN — SODIUM CHLORIDE: 9 INJECTION, SOLUTION INTRAVENOUS at 00:33

## 2025-07-27 RX ADMIN — ATORVASTATIN CALCIUM 80 MG: 40 TABLET, FILM COATED ORAL at 20:34

## 2025-07-27 ASSESSMENT — ACTIVITIES OF DAILY LIVING (ADL)
ADLS_ACUITY_SCORE: 37
ADLS_ACUITY_SCORE: 29
ADLS_ACUITY_SCORE: 37
ADLS_ACUITY_SCORE: 37
ADLS_ACUITY_SCORE: 29
ADLS_ACUITY_SCORE: 37
ADLS_ACUITY_SCORE: 29
ADLS_ACUITY_SCORE: 37
ADLS_ACUITY_SCORE: 29
ADLS_ACUITY_SCORE: 29
ADLS_ACUITY_SCORE: 37
ADLS_ACUITY_SCORE: 29
ADLS_ACUITY_SCORE: 37
ADLS_ACUITY_SCORE: 29
ADLS_ACUITY_SCORE: 37
ADLS_ACUITY_SCORE: 29
ADLS_ACUITY_SCORE: 37

## 2025-07-27 NOTE — PROGRESS NOTES
GASTROENTEROLOGY PROGRESS NOTE    SUBJECTIVE:  Patient feeling well today, no BMs since colonoscopy yesterday.    OBJECTIVE:    BP (!) 145/62 (BP Location: Left arm)   Pulse 80   Temp 98.6  F (37  C) (Oral)   Resp 16   Wt 66.2 kg (146 lb)   SpO2 98%   BMI 29.99 kg/m    Temp (24hrs), Av.8  F (37.1  C), Min:98.6  F (37  C), Max:99  F (37.2  C)    Patient Vitals for the past 72 hrs:   Weight   25 1504 66.2 kg (146 lb)       Intake/Output Summary (Last 24 hours) at 2025 1217  Last data filed at 2025 1400  Gross per 24 hour   Intake 450 ml   Output --   Net 450 ml         PHYSICAL EXAM    Constitutional: NAD, comfortable  Neuro: normal        Additional Comments:  ROS, FH, SH: See initial GI consult for details.    I have reviewed the patient's new clinical lab results:    Recent Labs   Lab Test 25  0724 25  1825 25  0625 25  1330 25  1239 25  1138 25  0441 25  1121   WBC 11.9*  --  8.3  --   --  14.8*   < > 7.2   HGB 9.8* 9.1* 5.8*   < >  --  9.0*   < > 13.9   MCV 85 87 90   < >  --  87   < > 89     --  301  --   --  527*   < > 254   INR  --   --   --   --  1.00  --   --  0.90    < > = values in this interval not displayed.     Recent Labs   Lab Test 25  0625 25  1138 25  0439    135 138   POTASSIUM 3.5 4.7 3.7   CHLORIDE 112* 101 104   CO2 22 20* 22   BUN 20.1 40.6* 13.0   CR 0.48* 0.60 0.58   ANIONGAP 11 14 12   HUGO 7.6* 9.2 8.5*     Recent Labs   Lab Test 25  1129 25  0625 25  1138 25  0439 25  0441 25  1121   ALBUMIN 3.8 3.0* 4.2 3.4*   < > 4.6   BILITOTAL 0.8 0.5 0.8 1.3*   < > 2.0*   * 111* 168* 239*   < > 380*   AST 67* 67* 94* 138*   < > 194*   ALKPHOS 220* 163* 238* 208*   < > 236*   PROTEIN  --   --   --   --   --  20*   LIPASE  --   --  27  --   --  25   AMYLASE  --   --   --  55  --   --     < > = values in this interval not displayed.         Principal  Problem:  Anemia  Assessment:  Pamela Engle is a 74 year old female with a past medical history significant for severe aortic stenosis, coronary artery disease status post stent to the RCA on May 28, 2025 on aspirin and Brilinta, cholecystectomy on 7/20/2025, admitted 7/25/25 with melena.   EGD 7/25/25 negative  Colonoscopy 7/26/25 negative with clear/yellow fluid, no new or old blood.  LDH and haptoglobin normal.  7/26/25 CT normal.  Occult GI bleed, possibly an AVM that comes and goes.    Plan:  Small bowel pill camera study Monday 7/28/25.  I have told the patient and her family than I cannot confirm that equipment is available today, [facility] does not have the equipment it comes from [facility], will not no with certainty we can get it until tomorrow morning around 8am.  Clears today  Additional prep ordered for pill cam  NPO at midnight  If still stable overnight would recommend restarting ASA tomorrow given cardiac history        Sarah Drew  [facility]  Office:  [phone]  40 minutes of total time was spent providing patient care, including patient evaluation, reviewing documentation/test results, and .

## 2025-07-27 NOTE — PLAN OF CARE
"End of Shift Summary  For vital signs and complete assessments, please see documentation flowsheets.     Pertinent assessments: A&Ox4. VSS. Denies pain. Denies dizziness. No signs of bleeding. Full liquid diet.     Major Shift Events: pt refused bedtime metoprolol, says she does not take anymore.     Treatment Plan: monitor hgb   Bedside Nurse: Meredith Giang RN     Goal Outcome Evaluation:      Plan of Care Reviewed With: patient    Overall Patient Progress: no changeOverall Patient Progress: no change    Outcome Evaluation: monitoring hgb      Problem: Adult Inpatient Plan of Care  Goal: Plan of Care Review  Description: The Plan of Care Review/Shift note should be completed every shift.  The Outcome Evaluation is a brief statement about your assessment that the patient is improving, declining, or no change.  This information will be displayed automatically on your shift  note.  Outcome: Progressing  Flowsheets (Taken 7/27/2025 0614)  Outcome Evaluation: monitoring hgb  Plan of Care Reviewed With: patient  Overall Patient Progress: no change  Goal: Patient-Specific Goal (Individualized)  Description: You can add care plan individualizations to a care plan. Examples of Individualization might be:  \"Parent requests to be called daily at 9am for status\", \"I have a hard time hearing out of my right ear\", or \"Do not touch me to wake me up as it startles  me\".  Outcome: Progressing  Goal: Absence of Hospital-Acquired Illness or Injury  Outcome: Progressing  Intervention: Identify and Manage Fall Risk  Recent Flowsheet Documentation  Taken 7/26/2025 2100 by Meredith Giang RN  Safety Promotion/Fall Prevention: safety round/check completed  Intervention: Prevent Skin Injury  Recent Flowsheet Documentation  Taken 7/26/2025 2100 by Meredith Giang RN  Body Position: position changed independently  Goal: Optimal Comfort and Wellbeing  Outcome: Progressing  Goal: Readiness for Transition of Care  Outcome: Progressing   "   Problem: Delirium  Goal: Optimal Coping  Outcome: Progressing  Goal: Improved Behavioral Control  Outcome: Progressing  Goal: Improved Attention and Thought Clarity  Outcome: Progressing  Goal: Improved Sleep  Outcome: Progressing

## 2025-07-27 NOTE — PROGRESS NOTES
[facility]    Medicine Progress Note - Hospitalist Service    Date of Admission:  7/25/2025    Pamela Engle is a 74 year old lady well-known to me who comes in with past medical history of aortic stenosis being worked up for TAVR, while working up for TAVR a coronary angiogram showed single-vessel disease in RCA which was stented 5/28/2025 following which patient is on aspirin and Brilinta.  She also had a robot assisted laparoscopic cholecystectomy with intraoperative cholangiogram by Dr. Chasidy Swift on 7/20/2025 came into [facility] with 2 days of melena, dizziness and shortness of breath after getting up from a large bowel movement which was black in color her hemoglobin decreasing from 10.5 to 9.  An upper GI endoscopy did not show any signs of or site of bleeding.  Subsequently patient's hemoglobin decreased further 9 at admission to 5.8 for which she was transfused with 2 units of RBCs.  A colonoscopy performed 7/26/2025 did not show any source or signs of bleeding.  Subsequently a CT scan of the abdomen did not show any site of bleeding.  Plan is to prep 7/27/2025 for possible capsule endoscopy on 7/28/2025.  Hemoglobin will be monitored closely.       Assessment & Plan     Acute blood loss anemia requiring packed RBC transfusion  Suspected GI bleeding source  Postural changes  Syncope probably secondary to blood loss anemia  Patient came in with 2 days of melena.   Is on treatment with aspirin and Brilinta status post PCI done 5/28/2025    - Highly appreciate continuous and extensive input from [facility] service  -Underwent earlier EGD and colonoscopy with no clear evidence regarding etiology of anemia and the GI bleeding.  -Intention in pursuing pill endoscopy by [facility].  Additional prep as per [facility]  -May have clear liquids  -Continue to hold antiplatelets    Abnormal liver functions  - With postoperative state likely from recent cholelithiasis with  "cholecystectomy  -Improving     Aortic stenosis  Plan to do TAVR  -Cardiology service following with us  -Appreciate input regarding resumption of DAPT once optimized from bleeding surgical perspective from [facility]  -Antiplatelets of Brilinta will be changed to Plavix as per cardiology when it safe to resume       Coronary artery disease  Single-vessel in RCA s/p RILEY stenting 5/28/2025  Hold off aspirin and Plavix at this time  As per cardiology recommendations - \"Once blood counts have stabilized and no evidence of continued life-threatening bleed I would resume baby aspirin and Plavix rather than Brilinta with no loading dose\".      Recent cholecystectomy for acute cholecystitis  No signs of bleeding in the abdomen     GERD now on PPI Protonix    Swelling on left upper extremity from prior IV site  - Vascular service had concerns regarding tenderness and swelling  - DVT study to rule out clot pending            Diet: Clear Liquid Diet  NPO for Procedure/Surgery per Anesthesia Guidelines Except for: Meds; Clear liquids before procedure/surgery: ADULT (Age GREATER than or Equal to 18 years) - Clear liquids 2 hours before procedure/surgery    DVT Prophylaxis: Pneumatic Compression Devices, Ambulate every shift, and currently not a candidate for chemo DVT prophylaxis given concerns for underlying GI bleeding  Duran Catheter: Not present  Lines: None     Cardiac Monitoring: None  Code Status: Full Code      Clinically Significant Risk Factors          # Hyperchloremia: Highest Cl = 112 mmol/L in last 2 days, will monitor as appropriate          # Hypoalbuminemia: Lowest albumin = 3 g/dL at 7/26/2025  6:25 AM, will monitor as appropriate                # Overweight: Estimated body mass index is 29.99 kg/m  as calculated from the following:    Height as of 7/18/25: 1.486 m (4' 10.5\").    Weight as of this encounter: 66.2 kg (146 lb)., PRESENT ON ADMISSION            Social Drivers of Health    Tobacco Use: Medium " Risk (7/25/2025)    Patient History     Smoking Tobacco Use: Former     Smokeless Tobacco Use: Never   Physical Activity: Insufficiently Active (10/2/2024)    Received from [facility]    Exercise Vital Sign     Days of Exercise per Week: 2 days     Minutes of Exercise per Session: 20 min   Social Connections: Unknown (9/7/2024)    Social Connection and Isolation Panel [NHANES]     Frequency of Social Gatherings with Friends and Family: Twice a week          Disposition Plan     Medically Ready for Discharge: Anticipated in 2-4 Days             Ryan Cruz MD, MD  Hospitalist Service  [facility]  Securely message with [employer] (more info)  Text page via [facility] Paging/Directory   ______________________________________________________________________    Interval History   I assume medicine service care today.  Seen and examined.  Chart reviewed.  Case discussed with nursing service.  Family updated this patient's sister and son present at bedside during encounter.  I met this pleasant lady this morning while she is sitting comfortably in the hospital bed.   appears to be in good spirits as she is endorsing ongoing chest pain or shortness of breath.  Voiding freely.  Denies any abdominal pain or sensation of being bloated.  No bowel movement yet.  No reports of any alteration mental state.  Currently not requiring any oxygen support.  Remained afebrile.      Physical Exam   Vital Signs: Temp: 98.6  F (37  C) Temp src: Oral BP: (!) 145/62 Pulse: 80   Resp: 16 SpO2: 98 % O2 Device: None (Room air)    Weight: 146 lbs 0 oz    HEENT; Atraumatic, normocephalic, pinkish conjuctiva, pupils bilateral reactive   Skin: warm and moist, no rashes  Lymphatics: no cervical or axillary lymphandenopathy  Lungs: equal chest expansion, clear to auscultation, no wheezes, no stridor, no crackles,   Heart: normal rate, normal rhythm, no rubs or gallops.   Abdomen: normal bowel sounds, no tenderness, no  peritoneal signs, no guarding  Extremities: no deformities, no edema   Minimal swelling on left upper extremity close to antecubital area  Tender upon palpation  Neuro; follow commands, alert and oriented x3, spontaneous speech, coherent, moves all extremities spontaneously  Psych; no hallucination, euthymic mood, not agitated      Medical Decision Making       50 yes MINUTES SPENT BY ME on the date of service doing chart review, history, exam, documentation & further activities per the note.  MANAGEMENT DISCUSSED with the following over the past 24 hours: Yes   NOTE(S)/MEDICAL RECORDS REVIEWED over the past 24 hours: Yes      Data     I have personally reviewed the following data over the past 24 hrs:    11.9 (H)  \   9.8 (L)   / 345     N/A N/A N/A /  N/A   N/A N/A N/A \     ALT: 112 (H) AST: 67 (H) AP: 220 (H) TBILI: 0.8   ALB: 3.8 TOT PROTEIN: 6.1 (L) LIPASE: N/A     Ferritin:  N/A % Retic:  N/A LDH:  N/A       Imaging results reviewed over the past 24 hrs:   No results found for this or any previous visit (from the past 24 hours).

## 2025-07-27 NOTE — PROGRESS NOTES
Chart reviewed  Last two hgb's stable, no recurrent melena.   Recommend starting   Baby ASA 81mg, plavix 75mg daily starting tomorrow if remains with stable Hgb and no recurrent bleeding.  Recommend daily PPI

## 2025-07-27 NOTE — PLAN OF CARE
"Goal Outcome Evaluation:      Plan of Care Reviewed With: patient    Overall Patient Progress: no changeOverall Patient Progress: no change    Outcome Evaluation: Admitted to floor      End of Shift Summary  For vital signs and complete assessments, please see documentation flowsheets.     Pertinent assessments: Continued care [id number]. VSS. Denied pain. Denied Lightheadedness/dizziness. No signs of bleeding. Tolerating full liquid diet.     Major Shift Events: hgb 5.8 this am, 2 units of blood transfused, Colonoscopy completed, CT completed. Cardiology consulted. Advanced to full liquid diet HGB recheck 9.1    Treatment Plan: monitor hgb and signs of bleeding     Bedside Nurse: Cezar Jules RN       Problem: Adult Inpatient Plan of Care  Goal: Plan of Care Review  Description: The Plan of Care Review/Shift note should be completed every shift.  The Outcome Evaluation is a brief statement about your assessment that the patient is improving, declining, or no change.  This information will be displayed automatically on your shift  note.  Outcome: Progressing  Flowsheets (Taken 7/26/2025 1903)  Plan of Care Reviewed With: patient  Goal: Patient-Specific Goal (Individualized)  Description: You can add care plan individualizations to a care plan. Examples of Individualization might be:  \"Parent requests to be called daily at 9am for status\", \"I have a hard time hearing out of my right ear\", or \"Do not touch me to wake me up as it startles  me\".  Outcome: Progressing  Goal: Absence of Hospital-Acquired Illness or Injury  Outcome: Progressing  Goal: Optimal Comfort and Wellbeing  Outcome: Progressing  Goal: Readiness for Transition of Care  Outcome: Progressing       "

## 2025-07-28 LAB
ALBUMIN SERPL BCG-MCNC: 3.7 G/DL (ref 3.5–5.2)
ALP SERPL-CCNC: 223 U/L (ref 40–150)
ALT SERPL W P-5'-P-CCNC: 109 U/L (ref 0–50)
ANION GAP SERPL CALCULATED.3IONS-SCNC: 14 MMOL/L (ref 7–15)
AST SERPL W P-5'-P-CCNC: 64 U/L (ref 0–45)
BILIRUB DIRECT SERPL-MCNC: 0.42 MG/DL (ref 0–0.3)
BILIRUB SERPL-MCNC: 0.9 MG/DL
BUN SERPL-MCNC: 8.2 MG/DL (ref 8–23)
CALCIUM SERPL-MCNC: 8.4 MG/DL (ref 8.8–10.4)
CHLORIDE SERPL-SCNC: 111 MMOL/L (ref 98–107)
CREAT SERPL-MCNC: 0.54 MG/DL (ref 0.51–0.95)
EGFRCR SERPLBLD CKD-EPI 2021: >90 ML/MIN/1.73M2
ERYTHROCYTE [DISTWIDTH] IN BLOOD BY AUTOMATED COUNT: 17.5 % (ref 10–15)
GLUCOSE SERPL-MCNC: 84 MG/DL (ref 70–99)
HCO3 SERPL-SCNC: 20 MMOL/L (ref 22–29)
HCT VFR BLD AUTO: 29.5 % (ref 35–47)
HGB BLD-MCNC: 9.8 G/DL (ref 11.7–15.7)
MCH RBC QN AUTO: 28.8 PG (ref 26.5–33)
MCHC RBC AUTO-ENTMCNC: 33.2 G/DL (ref 31.5–36.5)
MCV RBC AUTO: 87 FL (ref 78–100)
PLATELET # BLD AUTO: 360 10E3/UL (ref 150–450)
POTASSIUM SERPL-SCNC: 3.5 MMOL/L (ref 3.4–5.3)
PROT SERPL-MCNC: 6.4 G/DL (ref 6.4–8.3)
RBC # BLD AUTO: 3.4 10E6/UL (ref 3.8–5.2)
SODIUM SERPL-SCNC: 145 MMOL/L (ref 135–145)
WBC # BLD AUTO: 7.3 10E3/UL (ref 4–11)

## 2025-07-28 PROCEDURE — 120N000001 HC R&B MED SURG/OB

## 2025-07-28 PROCEDURE — 250N000013 HC RX MED GY IP 250 OP 250 PS 637: Performed by: INTERNAL MEDICINE

## 2025-07-28 PROCEDURE — 250N000013 HC RX MED GY IP 250 OP 250 PS 637: Performed by: STUDENT IN AN ORGANIZED HEALTH CARE EDUCATION/TRAINING PROGRAM

## 2025-07-28 PROCEDURE — 85014 HEMATOCRIT: CPT | Performed by: INTERNAL MEDICINE

## 2025-07-28 PROCEDURE — 82247 BILIRUBIN TOTAL: CPT | Performed by: INTERNAL MEDICINE

## 2025-07-28 PROCEDURE — 36415 COLL VENOUS BLD VENIPUNCTURE: CPT | Performed by: INTERNAL MEDICINE

## 2025-07-28 PROCEDURE — 84075 ASSAY ALKALINE PHOSPHATASE: CPT | Performed by: INTERNAL MEDICINE

## 2025-07-28 PROCEDURE — 99232 SBSQ HOSP IP/OBS MODERATE 35: CPT | Performed by: INTERNAL MEDICINE

## 2025-07-28 PROCEDURE — 82248 BILIRUBIN DIRECT: CPT | Performed by: INTERNAL MEDICINE

## 2025-07-28 PROCEDURE — 85018 HEMOGLOBIN: CPT | Performed by: INTERNAL MEDICINE

## 2025-07-28 RX ORDER — ASPIRIN 81 MG/1
81 TABLET ORAL DAILY
Status: DISCONTINUED | OUTPATIENT
Start: 2025-07-28 | End: 2025-07-29 | Stop reason: HOSPADM

## 2025-07-28 RX ORDER — CLOPIDOGREL BISULFATE 75 MG/1
75 TABLET ORAL DAILY
Status: DISCONTINUED | OUTPATIENT
Start: 2025-07-28 | End: 2025-07-29 | Stop reason: HOSPADM

## 2025-07-28 RX ADMIN — ASPIRIN 81 MG: 81 TABLET, DELAYED RELEASE ORAL at 20:01

## 2025-07-28 RX ADMIN — CLOPIDOGREL BISULFATE 75 MG: 75 TABLET, FILM COATED ORAL at 20:01

## 2025-07-28 RX ADMIN — PANTOPRAZOLE SODIUM 40 MG: 40 TABLET, DELAYED RELEASE ORAL at 08:55

## 2025-07-28 RX ADMIN — MAGNESIUM CITRATE 296 ML: 1.75 LIQUID ORAL at 06:16

## 2025-07-28 RX ADMIN — ATORVASTATIN CALCIUM 80 MG: 40 TABLET, FILM COATED ORAL at 20:00

## 2025-07-28 ASSESSMENT — ACTIVITIES OF DAILY LIVING (ADL)
ADLS_ACUITY_SCORE: 37

## 2025-07-28 NOTE — PROGRESS NOTES
GI FOLLOWUP WITH CAPSULE PLACEMENT        -Small bowel video study in progress  -Capsule placed at 1030 (this is an 8 hour study).  -Resume morning medications at 1230 (start at time of clear liquids).   -NPO until 1230, then clear liquids at 1230, then light meal at 1430  -Encourage ambulation to help facilitate capsule movement.  -Please remove equipment at 1830 and turn off machine. To turn off machine, hold down small black rubber button on left panel of machine.   -Capsule should pass in 24-48 hours, no need to retrieve.  -NO MRI UNTIL FOR 3 DAYS.   -If machine light on upper right corner blinks red (should be blue), please call our office at [phone].       Sharon Padilla, PAC  [facility] ([facility])  Office: [phone], also on vocera.

## 2025-07-28 NOTE — PLAN OF CARE
"Goal Outcome Evaluation:      Plan of Care Reviewed With: patient          Outcome Evaluation: pill endoscopy tomorrow, no signs of bleeding    End of Shift Summary  For vital signs and complete assessments, please see documentation flowsheets.     Pertinent assessments: A&Ox4. VSS. Denies pain. Denies dizziness. No signs of bleeding. Clear liquid diet.     Major Shift Events: Started miralax prep    Treatment Plan: monitor hgb/ Pill endoscopy 7/28   Bedside Nurse: Cezar Jules RN       Problem: Adult Inpatient Plan of Care  Goal: Plan of Care Review  Description: The Plan of Care Review/Shift note should be completed every shift.  The Outcome Evaluation is a brief statement about your assessment that the patient is improving, declining, or no change.  This information will be displayed automatically on your shift  note.  Outcome: Progressing  Flowsheets (Taken 7/27/2025 1922)  Outcome Evaluation: pill endoscopy tomorrow, no signs of bleeding  Plan of Care Reviewed With: patient  Goal: Patient-Specific Goal (Individualized)  Description: You can add care plan individualizations to a care plan. Examples of Individualization might be:  \"Parent requests to be called daily at 9am for status\", \"I have a hard time hearing out of my right ear\", or \"Do not touch me to wake me up as it startles  me\".  Outcome: Progressing  Goal: Absence of Hospital-Acquired Illness or Injury  Outcome: Progressing  Intervention: Identify and Manage Fall Risk  Recent Flowsheet Documentation  Taken 7/27/2025 0915 by Cezar Jules RN  Safety Promotion/Fall Prevention: safety round/check completed  Intervention: Prevent Skin Injury  Recent Flowsheet Documentation  Taken 7/27/2025 0915 by Cezar Jules RN  Body Position: position changed independently  Goal: Optimal Comfort and Wellbeing  Outcome: Progressing  Goal: Readiness for Transition of Care  Outcome: Progressing         "

## 2025-07-28 NOTE — PROGRESS NOTES
Brief cardiology note, chart reviewed    Last two Hgb stable. Would recommend resuming aspirin 81 mg daily and plavix 75 mg daily with daily PPI if optimized from GI perspective.     Cardiology will sign off, recommend checking hemoglobin in 1 week. Follow up with primary cardiology team as scheduled 8/7/25     Valerie Weber PA-C  7/28/2025 11:47 AM

## 2025-07-28 NOTE — PLAN OF CARE
"End of Shift Summary  For vital signs and complete assessments, please see documentation flowsheets.     Pertinent assessments: Pt A&OX4,VSS, On RA. Denies pain. Small bowel video study done, indep in the room. PIV SL. Pt on tele.    Major Shift Events: None     Treatment Plan: monitor Hgb  Bedside Nurse: Antonieta Suresh RN   Problem: Adult Inpatient Plan of Care  Goal: Plan of Care Review  Description: The Plan of Care Review/Shift note should be completed every shift.  The Outcome Evaluation is a brief statement about your assessment that the patient is improving, declining, or no change.  This information will be displayed automatically on your shift  note.  Outcome: Not Progressing  Flowsheets (Taken 7/28/2025 3937)  Plan of Care Reviewed With: patient  Goal: Patient-Specific Goal (Individualized)  Description: You can add care plan individualizations to a care plan. Examples of Individualization might be:  \"Parent requests to be called daily at 9am for status\", \"I have a hard time hearing out of my right ear\", or \"Do not touch me to wake me up as it startles  me\".  Outcome: Not Progressing  Goal: Absence of Hospital-Acquired Illness or Injury  Outcome: Not Progressing  Intervention: Identify and Manage Fall Risk  Recent Flowsheet Documentation  Taken 7/28/2025 1153 by Antonieta Suresh RN  Safety Promotion/Fall Prevention:   safety round/check completed   room organization consistent   room near nurse's station   patient and family education   nonskid shoes/slippers when out of bed   lighting adjusted   increase visualization of patient   increased rounding and observation   clutter free environment maintained  Intervention: Prevent Skin Injury  Recent Flowsheet Documentation  Taken 7/28/2025 1153 by Antonieta Suresh RN  Body Position: position changed independently  Intervention: Prevent Infection  Recent Flowsheet Documentation  Taken 7/28/2025 1153 by Antonieta Suresh RN  Infection Prevention: cohorting " utilized  Goal: Optimal Comfort and Wellbeing  Outcome: Not Progressing  Goal: Readiness for Transition of Care  Outcome: Not Progressing     Problem: Delirium  Goal: Optimal Coping  Outcome: Not Progressing  Goal: Improved Behavioral Control  Outcome: Not Progressing  Intervention: Minimize Safety Risk  Recent Flowsheet Documentation  Taken 7/28/2025 1153 by Antonieta Suresh, RN  Enhanced Safety Measures: review medications for side effects with activity  Goal: Improved Attention and Thought Clarity  Outcome: Not Progressing  Goal: Improved Sleep  Outcome: Not Progressing   Goal Outcome Evaluation:      Plan of Care Reviewed With: patient

## 2025-07-28 NOTE — PROGRESS NOTES
Patient stated that she missed a call from Dr. Gildardo Gustafson today, I am not seeing any notes that someone called her today. Resumed DAPT

## 2025-07-28 NOTE — PLAN OF CARE
"End of Shift Summary  For vital signs and complete assessments, please see documentation flowsheets.     Pertinent assessments: A&Ox4. VSS. Finished bowel prep for capsule endoscopy today. No signs of bleeding. Denies pain and nausea. NPO since midnight.     Major Shift Events: uneventful    Treatment Plan: monitor hgb, capsule endoscopy today   Bedside Nurse: Meredith Giang RN       Goal Outcome Evaluation:      Plan of Care Reviewed With: patient    Overall Patient Progress: no changeOverall Patient Progress: no change    Outcome Evaluation: no signs of bleeding    Problem: Adult Inpatient Plan of Care  Goal: Plan of Care Review  Description: The Plan of Care Review/Shift note should be completed every shift.  The Outcome Evaluation is a brief statement about your assessment that the patient is improving, declining, or no change.  This information will be displayed automatically on your shift  note.  Outcome: Progressing  Flowsheets (Taken 7/28/2025 0542)  Outcome Evaluation: no signs of bleeding  Plan of Care Reviewed With: patient  Overall Patient Progress: no change  Goal: Patient-Specific Goal (Individualized)  Description: You can add care plan individualizations to a care plan. Examples of Individualization might be:  \"Parent requests to be called daily at 9am for status\", \"I have a hard time hearing out of my right ear\", or \"Do not touch me to wake me up as it startles  me\".  Outcome: Progressing  Goal: Absence of Hospital-Acquired Illness or Injury  Outcome: Progressing  Intervention: Identify and Manage Fall Risk  Recent Flowsheet Documentation  Taken 7/27/2025 1950 by Meredith Giang RN  Safety Promotion/Fall Prevention: safety round/check completed  Goal: Optimal Comfort and Wellbeing  Outcome: Progressing  Goal: Readiness for Transition of Care  Outcome: Progressing     Problem: Delirium  Goal: Optimal Coping  Outcome: Progressing  Goal: Improved Behavioral Control  Outcome: Progressing  Goal: " Improved Attention and Thought Clarity  Outcome: Progressing  Goal: Improved Sleep  Outcome: Progressing

## 2025-07-28 NOTE — PROGRESS NOTES
[facility]    Medicine Progress Note - Hospitalist Service    Date of Admission:  7/25/2025    Pamela Engle is a 74 year old lady well-known to me who comes in with past medical history of aortic stenosis being worked up for TAVR, while working up for TAVR a coronary angiogram showed single-vessel disease in RCA which was stented 5/28/2025 following which patient is on aspirin and Brilinta.  She also had a robot assisted laparoscopic cholecystectomy with intraoperative cholangiogram by Dr. Chasidy Swift on 7/20/2025 came into [facility] with 2 days of melena, dizziness and shortness of breath after getting up from a large bowel movement which was black in color her hemoglobin decreasing from 10.5 to 9.  An upper GI endoscopy did not show any signs of or site of bleeding.  Subsequently patient's hemoglobin decreased further 9 at admission to 5.8 for which she was transfused with 2 units of RBCs.  A colonoscopy performed 7/26/2025 did not show any source or signs of bleeding.  Subsequently a CT scan of the abdomen did not show any site of bleeding.  Plan is to prep 7/27/2025 for possible capsule endoscopy on 7/28/2025.  Hemoglobin will be monitored closely.       Assessment & Plan     Acute blood loss anemia requiring packed RBC transfusion  Suspected GI bleeding source  Postural changes  Syncope probably secondary to blood loss anemia  Patient came in with 2 days of melena.   Is on treatment with aspirin and Brilinta status post PCI done 5/28/2025    - Highly appreciate continuous and extensive input from [facility] service  -Underwent earlier EGD and colonoscopy with no clear evidence regarding etiology of anemia and the GI bleeding.  -Plans for pill capsule endoscopy today care of [facility]  - Advancement of diet as per [facility]    Abnormal liver functions  - With postoperative state likely from recent cholelithiasis with cholecystectomy  -Improving     Aortic  "stenosis  Plan to do TAVR  -Cardiology service following with us  -Appreciate input regarding resumption of DAPT once optimized from bleeding surgical perspective from [facility]  -Antiplatelets of Brilinta will be changed to Plavix as per cardiology when it safe to resume  - Intention to resume aspirin and Plavix (her DAPT) later today if optimized from [facility] bleeding perspective         Coronary artery disease  Single-vessel in RCA s/p RILEY stenting 5/28/2025  Hold off aspirin and Plavix at this time  As per cardiology recommendations - \"Once blood counts have stabilized and no evidence of continued life-threatening bleed I would resume baby aspirin and Plavix rather than Brilinta with no loading dose\".      Recent cholecystectomy for acute cholecystitis  Abnormal LFTs likely from recent cholecystitis-improving  No signs of bleeding in the abdomen     GERD now on PPI Protonix    Swelling on left upper extremity from prior IV site  - No evidence of deep or superficial venous thrombosis            Diet: NPO for Procedure/Surgery per Anesthesia Guidelines Except for: Meds; Clear liquids before procedure/surgery: ADULT (Age GREATER than or Equal to 18 years) - Clear liquids 2 hours before procedure/surgery    DVT Prophylaxis: Pneumatic Compression Devices, Ambulate every shift, and currently not a candidate for chemo DVT prophylaxis given concerns for underlying GI bleeding  Duran Catheter: Not present  Lines: None     Cardiac Monitoring: None  Code Status: Full Code      Clinically Significant Risk Factors          # Hyperchloremia: Highest Cl = 111 mmol/L in last 2 days, will monitor as appropriate      # Hypocalcemia: Lowest Ca = 8.4 mg/dL in last 2 days, will monitor and replace as appropriate     # Hypoalbuminemia: Lowest albumin = 3 g/dL at 7/26/2025  6:25 AM, will monitor as appropriate                # Overweight: Estimated body mass index is 29.99 kg/m  as calculated from the following:    Height as " "of 7/18/25: 1.486 m (4' 10.5\").    Weight as of this encounter: 66.2 kg (146 lb)., PRESENT ON ADMISSION            Social Drivers of Health    Tobacco Use: Medium Risk (7/25/2025)    Patient History     Smoking Tobacco Use: Former     Smokeless Tobacco Use: Never   Physical Activity: Insufficiently Active (10/2/2024)    Received from [facility]    Exercise Vital Sign     Days of Exercise per Week: 2 days     Minutes of Exercise per Session: 20 min   Social Connections: Unknown (9/7/2024)    Social Connection and Isolation Panel [NHANES]     Frequency of Social Gatherings with Friends and Family: Twice a week          Disposition Plan     Medically Ready for Discharge: Anticipated Tomorrow  If able to tolerate resumption of antiplatelets and no recurrent bleeding symptoms and continues to demonstrate stable hemoglobin levels           Ryan Cruz MD, MD  Hospitalist Service  [facility]  Securely message with [employer] (more info)  Text page via "[employer] Paging/Directory   ______________________________________________________________________    Interval History   Continuing medicine service care today.  Seen and examined.  Chart reviewed.  Case discussed with nursing service.  Family updated as multiple family members present at bedside during encounter   - No reports of any bleeding symptoms overnight.  Had several stooling as she is being prepped for pill capsule endoscopy   - Denies any vomiting or bloating or complaints of chest pain or shortness of breath.  Able to tolerate liquid diet overnight   - No reported issues of any chest pain, alteration mental state.  Afebrile.  Not requiring oxygen support.          Physical Exam   Vital Signs: Temp: 98.8  F (37.1  C) Temp src: Oral BP: 112/69 Pulse: 76   Resp: 16 SpO2: 98 % O2 Device: None (Room air)    Weight: 146 lbs 0 oz    HEENT; Atraumatic, normocephalic, pinkish conjuctiva, pupils bilateral reactive   Skin: warm and moist, no " rashes  Lymphatics: no cervical or axillary lymphandenopathy  Lungs: equal chest expansion, clear to auscultation, no wheezes, no stridor, no crackles,   Heart: normal rate, normal rhythm, no rubs or gallops.   Abdomen: normal bowel sounds, no tenderness, no peritoneal signs, no guarding  Extremities: no deformities, no edema   Minimal swelling on left upper extremity close to antecubital area  Tender upon palpation  Neuro; follow commands, alert and oriented x3, spontaneous speech, coherent, moves all extremities spontaneously  Psych; no hallucination, euthymic mood, not agitated      Medical Decision Making       40 MINUTES SPENT BY ME on the date of service doing chart review, history, exam, documentation & further activities per the note.  MANAGEMENT DISCUSSED with the following over the past 24 hours: Yes   NOTE(S)/MEDICAL RECORDS REVIEWED over the past 24 hours: Yes      Data     I have personally reviewed the following data over the past 24 hrs:    7.3  \   9.8 (L)   / 360     145 111 (H) 8.2 /  84   3.5 20 (L) 0.54 \     ALT: 109 (H) AST: 64 (H) AP: 223 (H) TBILI: 0.9   ALB: 3.7 TOT PROTEIN: 6.4 LIPASE: N/A       Imaging results reviewed over the past 24 hrs:   No results found for this or any previous visit (from the past 24 hours).

## 2025-07-29 ENCOUNTER — TRANSFERRED RECORDS (OUTPATIENT)
Dept: ADMINISTRATIVE | Facility: CLINIC | Age: 75
End: 2025-07-29
Payer: COMMERCIAL

## 2025-07-29 VITALS
SYSTOLIC BLOOD PRESSURE: 133 MMHG | DIASTOLIC BLOOD PRESSURE: 58 MMHG | TEMPERATURE: 99.1 F | BODY MASS INDEX: 29.99 KG/M2 | OXYGEN SATURATION: 97 % | WEIGHT: 146 LBS | HEART RATE: 70 BPM | RESPIRATION RATE: 16 BRPM

## 2025-07-29 LAB
ERYTHROCYTE [DISTWIDTH] IN BLOOD BY AUTOMATED COUNT: 17 % (ref 10–15)
HCT VFR BLD AUTO: 31.1 % (ref 35–47)
HGB BLD-MCNC: 10.4 G/DL (ref 11.7–15.7)
MCH RBC QN AUTO: 29.1 PG (ref 26.5–33)
MCHC RBC AUTO-ENTMCNC: 33.4 G/DL (ref 31.5–36.5)
MCV RBC AUTO: 87 FL (ref 78–100)
PLATELET # BLD AUTO: 402 10E3/UL (ref 150–450)
RBC # BLD AUTO: 3.57 10E6/UL (ref 3.8–5.2)
WBC # BLD AUTO: 7.6 10E3/UL (ref 4–11)

## 2025-07-29 PROCEDURE — 250N000013 HC RX MED GY IP 250 OP 250 PS 637: Performed by: STUDENT IN AN ORGANIZED HEALTH CARE EDUCATION/TRAINING PROGRAM

## 2025-07-29 PROCEDURE — 85014 HEMATOCRIT: CPT | Performed by: INTERNAL MEDICINE

## 2025-07-29 PROCEDURE — 36415 COLL VENOUS BLD VENIPUNCTURE: CPT | Performed by: INTERNAL MEDICINE

## 2025-07-29 PROCEDURE — 250N000013 HC RX MED GY IP 250 OP 250 PS 637: Performed by: INTERNAL MEDICINE

## 2025-07-29 PROCEDURE — 99239 HOSP IP/OBS DSCHRG MGMT >30: CPT | Performed by: INTERNAL MEDICINE

## 2025-07-29 RX ORDER — PANTOPRAZOLE SODIUM 40 MG/1
40 TABLET, DELAYED RELEASE ORAL
Qty: 30 TABLET | Refills: 1 | Status: SHIPPED | OUTPATIENT
Start: 2025-07-30

## 2025-07-29 RX ADMIN — ASPIRIN 81 MG: 81 TABLET, DELAYED RELEASE ORAL at 09:07

## 2025-07-29 RX ADMIN — CLOPIDOGREL BISULFATE 75 MG: 75 TABLET, FILM COATED ORAL at 09:07

## 2025-07-29 RX ADMIN — PANTOPRAZOLE SODIUM 40 MG: 40 TABLET, DELAYED RELEASE ORAL at 06:52

## 2025-07-29 ASSESSMENT — ACTIVITIES OF DAILY LIVING (ADL)
ADLS_ACUITY_SCORE: 37

## 2025-07-29 NOTE — PLAN OF CARE
"Pertinent assessments: Pt is A&O x4,VSS,Ind, On RA. Reg diet, denied metroprolol till she sees a cardiologist in August, Denies pain. Has 5 lap incisions to abd all GLENDA,  PIV is saline locked. Pt on tele.    Major Shift Events: None     Treatment Plan: monitor Hgb  Bedside Nurse: Melani Rosales RN       Problem: Adult Inpatient Plan of Care  Goal: Plan of Care Review  Description: The Plan of Care Review/Shift note should be completed every shift.  The Outcome Evaluation is a brief statement about your assessment that the patient is improving, declining, or no change.  This information will be displayed automatically on your shift  note.  Outcome: Progressing  Flowsheets (Taken 7/29/2025 5361)  Plan of Care Reviewed With: patient  Overall Patient Progress: improving  Goal: Patient-Specific Goal (Individualized)  Description: You can add care plan individualizations to a care plan. Examples of Individualization might be:  \"Parent requests to be called daily at 9am for status\", \"I have a hard time hearing out of my right ear\", or \"Do not touch me to wake me up as it startles  me\".  Outcome: Progressing  Goal: Absence of Hospital-Acquired Illness or Injury  Outcome: Progressing  Intervention: Identify and Manage Fall Risk  Recent Flowsheet Documentation  Taken 7/28/2025 1945 by Melani Rosales RN  Safety Promotion/Fall Prevention: safety round/check completed  Intervention: Prevent Skin Injury  Recent Flowsheet Documentation  Taken 7/28/2025 1945 by Melani Rosales, RN  Body Position: position changed independently  Intervention: Prevent Infection  Recent Flowsheet Documentation  Taken 7/28/2025 1945 by Melani Rosales RN  Infection Prevention: cohorting utilized  Goal: Optimal Comfort and Wellbeing  Outcome: Progressing  Goal: Readiness for Transition of Care  Outcome: Progressing     Problem: Delirium  Goal: Optimal Coping  Outcome: Progressing  Goal: Improved Behavioral Control  Outcome: Progressing  Intervention: " Minimize Safety Risk  Recent Flowsheet Documentation  Taken 7/28/2025 1945 by Melani Rosales, RN  Enhanced Safety Measures: review medications for side effects with activity  Goal: Improved Attention and Thought Clarity  Outcome: Progressing  Goal: Improved Sleep  Outcome: Progressing   Goal Outcome Evaluation:      Plan of Care Reviewed With: patient    Overall Patient Progress: improvingOverall Patient Progress: improving

## 2025-07-29 NOTE — DISCHARGE SUMMARY
Patient discharged to home via private vehicle  accompanied by sister.  IV: Discontinued  Prescriptions filled and given to patient/family.   Belongings reviewed and sent with patient.   Home medications returned to patient: NA  Equipment sent with: patient, N/A.   patient verbalizes understanding of discharge instructions. AVS given to patient.  Additional education completed? Anticoagulation Therapy      Prescription of 600mg starting dose of plavix ordered by cardiologist discussed and confirmed with Hospitalist. RN reinforced education on first completing remainder of Brilinta before beginning Plavix per discharge orders. Pt acknowledged.

## 2025-07-29 NOTE — PROGRESS NOTES
GASTROENTEROLOGY PROGRESS NOTE     SUBJECTIVE:  Brown stool this morning, still liquid from prep. No abdominal pain, n/v. Tolerating regular diet.      OBJECTIVE:  /58 (BP Location: Right arm)   Pulse 70   Temp 99.1  F (37.3  C) (Oral)   Resp 16   Wt 66.2 kg (146 lb)   SpO2 97%   BMI 29.99 kg/m    Temp (24hrs), Av.7  F (37.1  C), Min:98.5  F (36.9  C), Max:99.1  F (37.3  C)    No data found.  No intake or output data in the 24 hours ending 25 1330     PHYSICAL EXAM  Gen: alert, oriented, NAD  Abd: soft, NT/ND, +BS           Additional Comments:  ROS, FH, SH: See initial GI consult for details.     I have reviewed the patient's new clinical lab results:     Recent Labs   Lab Test 25  0819 25  0628 25  0724 25  1330 25  1239 25  0441 25  1121   WBC 7.6 7.3 11.9*   < >  --    < > 7.2   HGB 10.4* 9.8* 9.8*   < >  --    < > 13.9   MCV 87 87 85   < >  --    < > 89    360 345   < >  --    < > 254   INR  --   --   --   --  1.00  --  0.90    < > = values in this interval not displayed.     Recent Labs   Lab Test 25  0625  0625  1138   POTASSIUM 3.5 3.5 4.7   CHLORIDE 111* 112* 101   CO2 20* 22 20*   BUN 8.2 20.1 40.6*   ANIONGAP 14 11 14     Recent Labs   Lab Test 25  0628 25  1129 25  0625 25  1138 25  0439 25  0441 25  1121   ALBUMIN 3.7 3.8 3.0* 4.2 3.4*   < > 4.6   BILITOTAL 0.9 0.8 0.5 0.8 1.3*   < > 2.0*   * 112* 111* 168* 239*   < > 380*   AST 64* 67* 67* 94* 138*   < > 194*   PROTEIN  --   --   --   --   --   --  20*   LIPASE  --   --   --  27  --   --  25   AMYLASE  --   --   --   --  55  --   --     < > = values in this interval not displayed.     Imagin/26 CT abd/pelvis:  1.  Surgical changes of a recent cholecystectomy. No complications visualized.  2.  Bilateral nonobstructing renal calculi.  3.  There are a few colonic diverticula without diverticulitis.  4.  No  lymphadenopathy.    Endoscopy:  7/25 EGD:  - Z-line regular, 37 cm from the incisors.                             - Normal esophagus.                             - Normal stomach.                             - Normal examined duodenum.                             - No specimens collected.     7/26 Colonoscopy:  - Mild diverticulosis in the sigmoid colon and in                             the descending colon. There was no evidence of                             diverticular bleeding.                             - The examined portion of the ileum was normal.                             - The examination was otherwise normal on direct                             and retroflexion views.                             - No specimens collected.                             EGD and Colon have been normal with no new or old                             blood. Ongoing anemia. Report of black stools                             (fluid in colon today was clear/light yellow).     Assessment:  74 year old female with history of aortic stenosis, CAD s/p cardiac stent 5/28 on aspirin and Brilinta, recent cholecystectomy 7/20, admitted 7/25 with melena, dizziness, SOB. Found to have acute on chronic anemia. HGB 10.5-->9.     1. Melena. EGD and colonoscopy without source of bleeding, notably does have diverticulosis. Due to ongoing anemia, underwent inpatient Pillcam yesterday to help localized small bowel source. Our office is working on getting results. Fortunately, melena has resolved. HGB has stabilized. She is back on aspirin and Plavix has been started.     Plan:  --RICHIE.   --Monitor HGB.   --PPI daily given need for aspirin.   --Avoid NSAIDs.   --Our office will work on getting results by 3pm today but patient does not need to remain admitted for the results. Our office can communicate with her after discharge.     Time spent: 20 minutes, greater than 50% of the visit was spent in counseling/coordination of care.     Sharon  LETTY Padilla  [facility] ([facility])

## 2025-07-29 NOTE — PLAN OF CARE
"End of Shift Summary  For vital signs and complete assessments, please see documentation flowsheets.     Pertinent assessments: Pt is A&O x4,VSS,Ind, On RA.denied pain. PIV removed.     Major Shift Events: discharging home.   Problem: Adult Inpatient Plan of Care  Goal: Plan of Care Review  Description: The Plan of Care Review/Shift note should be completed every shift.  The Outcome Evaluation is a brief statement about your assessment that the patient is improving, declining, or no change.  This information will be displayed automatically on your shift  note.  Outcome: Not Progressing  Flowsheets (Taken 7/29/2025 1546)  Plan of Care Reviewed With: patient  Overall Patient Progress: improving  Goal: Patient-Specific Goal (Individualized)  Description: You can add care plan individualizations to a care plan. Examples of Individualization might be:  \"Parent requests to be called daily at 9am for status\", \"I have a hard time hearing out of my right ear\", or \"Do not touch me to wake me up as it startles  me\".  Outcome: Not Progressing  Goal: Absence of Hospital-Acquired Illness or Injury  Outcome: Not Progressing  Intervention: Identify and Manage Fall Risk  Recent Flowsheet Documentation  Taken 7/29/2025 1034 by Antonieta Suresh RN  Safety Promotion/Fall Prevention: safety round/check completed  Intervention: Prevent Skin Injury  Recent Flowsheet Documentation  Taken 7/29/2025 1034 by Antonieta Suresh RN  Body Position: position changed independently  Taken 7/29/2025 1000 by Antonieta Suresh RN  Body Position: position changed independently  Intervention: Prevent Infection  Recent Flowsheet Documentation  Taken 7/29/2025 1034 by Antonieta Suresh RN  Infection Prevention: cohorting utilized  Goal: Optimal Comfort and Wellbeing  Outcome: Not Progressing  Goal: Readiness for Transition of Care  Outcome: Not Progressing     Problem: Delirium  Goal: Optimal Coping  Outcome: Not Progressing  Goal: Improved Behavioral " Control  Outcome: Not Progressing  Intervention: Minimize Safety Risk  Recent Flowsheet Documentation  Taken 7/29/2025 1034 by Antonieta Suresh, RN  Enhanced Safety Measures: review medications for side effects with activity  Goal: Improved Attention and Thought Clarity  Outcome: Not Progressing  Goal: Improved Sleep  Outcome: Not Progressing   Goal Outcome Evaluation:      Plan of Care Reviewed With: patient    Overall Patient Progress: improvingOverall Patient Progress: improving

## 2025-07-29 NOTE — PLAN OF CARE
"Temp: 98.5  F (36.9  C) Temp src: Oral BP: (!) 141/63 Pulse: 74   Resp: 16 SpO2: 97 % O2 Device: None (Room air)       Orientation:  A&O x4  VS: VSS  Pain:  Denies pain  Tele:  SR  Activity:  independent  Resp:  RA  GI:  Denies nausea and vomiting  : Voiding without difficulty   Skin:  WDL  Lines: PIV SL  Diet: Regular  Plan: Home  Discharge:  Pending    Problem: Adult Inpatient Plan of Care  Goal: Plan of Care Review  Description: The Plan of Care Review/Shift note should be completed every shift.  The Outcome Evaluation is a brief statement about your assessment that the patient is improving, declining, or no change.  This information will be displayed automatically on your shift  note.  Outcome: Progressing  Flowsheets (Taken 7/28/2025 1912)  Outcome Evaluation: Monitor off at 6:30  Plan of Care Reviewed With: patient  Overall Patient Progress: no change  Goal: Patient-Specific Goal (Individualized)  Description: You can add care plan individualizations to a care plan. Examples of Individualization might be:  \"Parent requests to be called daily at 9am for status\", \"I have a hard time hearing out of my right ear\", or \"Do not touch me to wake me up as it startles  me\".  Outcome: Progressing  Goal: Absence of Hospital-Acquired Illness or Injury  Outcome: Progressing  Intervention: Identify and Manage Fall Risk  Recent Flowsheet Documentation  Taken 7/28/2025 1816 by Derrek Contreras RN  Safety Promotion/Fall Prevention: safety round/check completed  Intervention: Prevent Infection  Recent Flowsheet Documentation  Taken 7/28/2025 1816 by Derrek Contreras RN  Infection Prevention: cohorting utilized  Goal: Optimal Comfort and Wellbeing  Outcome: Progressing  Goal: Readiness for Transition of Care  Outcome: Progressing       Goal Outcome Evaluation:      Plan of Care Reviewed With: patient    Overall Patient Progress: no changeOverall Patient Progress: no change    Outcome Evaluation: Monitor off at " 6:30

## 2025-07-29 NOTE — DISCHARGE SUMMARY
"[facility]  Hospitalist Discharge Summary      Date of Admission:  7/25/2025  Date of Discharge:  7/29/2025  Discharging Provider: Reji Deras MD  Discharge Service: Hospitalist Service    Discharge Diagnoses   Please see hospital course below    Clinically Significant Risk Factors     # Overweight: Estimated body mass index is 29.99 kg/m  as calculated from the following:    Height as of 7/18/25: 1.486 m (4' 10.5\").    Weight as of this encounter: 66.2 kg (146 lb).       Follow-ups Needed After Discharge   Follow-up Appointments       Follow Up      Follow up with GI as instructed, their office will contact you for the result of the PillCam and also follow-up        Hospital Follow-up with Existing Primary Care Provider (PCP)          Schedule Primary Care visit within: 7 Days   Recommended labs and Imaging (to be ordered by Primary Care Provider): CBC on 8/1, result to primary care provider               Discharge Disposition   Discharged to home  Condition at discharge: Stable    Hospital Course   Pamela Engle is a 74 year old lady with PMH significant for aortic stenosis being worked up for TAVR, while working up for TAVR a coronary angiogram showed single-vessel disease in RCA which was stented 5/28/2025 following which patient is on aspirin and Brilinta.  She also had a robot assisted laparoscopic cholecystectomy with intraoperative cholangiogram on 7/20/2025.  She came to [facility] with 2 days of melena, dizziness and shortness of breath after getting up from a large bowel movement with decreasing hemoglobin  from 10.5 to 9.  An upper GI endoscopy did not show any signs of or site of bleeding.  Subsequently patient's hemoglobin decreased further 9 at admission to 5.8 for which she was transfused with 2 units of RBCs.  A colonoscopy performed 7/26/2025 did not show any source or signs of bleeding.  Subsequently a CT angio of abdomen did not show any site of " bleeding.  Patient was prepped  on 7/27/2025, had a PillCam on 7/28/2025.  Hemoglobin remained stable, GI recommended that she can be discharged with a follow-up with the capsule endoscopy report as an outpatient .  .   Acute blood loss anemia requiring packed RBC transfusion  Suspected GI bleeding source  Postural changes  Syncope probably secondary to blood loss anemia  GERD  Patient presented with 2 days of melena, EGD, colonoscopy did not show any site of bleeding .  PillCam done, result is pending.  Patient was on aspirin and Brilinta status post PCI done 5/28/2025.  - GI input appreciated  - Brilinta transition to Plavix.  - Patient will be on Protonix.  - Discontinue meloxicam,  - Patient also needs her aspirin daily,  - She has been worked up for aortic valve replacement as an outpatient, in the meantime she will continue with current treatment.  - She will follow-up with GI as an.  - Will have his CBC checked in 3 days.      Abnormal liver functions  - Patient is postoperative state after she had Barbara cystectomy for cholelithiasis.  - Check CMP as an outpatient on 8/1 .    Other chronic medical conditions.  Aortic stenosis  Coronary disease status post stent with RILEY in 5/2025  -Plan to do TAVR  -Cardiology consulted while patient is here, input appreciated, patient restarted on DAPT .  - Monitor for any sign of bleeding .  - Check CBC in 3 days .  - Will continue aspirin and Plavix.       Recent cholecystectomy for acute cholecystitis  Abnormal LFTs likely from recent cholecystitis-improving  No signs of bleeding in the abdomen  Check CMP on 8/1     Incidental: Bilateral nephrolithiasis.  Left 5 x 7 mm, right 2 mm.  - This is discussed with patient and family members in detail and signs symptoms of renal colic also discussed, advised when to seek medical help.  -I also advised to discuss with her primary care physician.    Consultations This Hospital Stay   CARDIOLOGY IP CONSULT  CONSULT FOR INPATIENT  VASCULAR ACCESS CARE    Code Status   Full Code    Time Spent on this Encounter   I, Reji Deras MD, personally saw the patient today and spent greater than 30 minutes discharging this patient.       Reji Deras MD  [place] [address] [facility]  [address]  [facility] [address]  Phone: [phone]  Fax: [phone]  ______________________________________________________________________    Physical Exam   Vital Signs: Temp: 99.1  F (37.3  C) Temp src: Oral BP: 133/58 Pulse: 70   Resp: 16 SpO2: 97 % O2 Device: None (Room air)    Weight: 146 lbs 0 oz    General: Alert and oriented, pleasant and cooperative, not in any form of distress.  HEEN: Pink, nonicteric, moist oral mucosa.  Chest : Good air entry bilaterally, no wheezing, crackles or rhonchi.  CVS: S1 and S2 regular, positive systolic murmur at aortic site, no thrill.  Abdomen: Soft, nontender, nondistended .  Ext: No edema or deformity.  Neuro: No focal deficits.        Primary Care Physician   Maxine Bonner    Discharge Orders      Reason for your hospital stay    Acute blood loss anemia suspected from GI loss, status post EGD, colonoscopy and PillCam.  Incidental finding: Bilateral renal stone, patient will discuss with her primary care provider.     Activity    Your activity upon discharge: activity as tolerated     Follow Up    Follow up with GI as instructed, their office will contact you for the result of the PillCam and also follow-up     Diet    Follow this diet upon discharge: Current Diet:Orders Placed This Encounter      Regular Diet Adult     Hospital Follow-up with Existing Primary Care Provider (PCP)            Significant Results and Procedures   Most Recent 3 CBC's:  Recent Labs   Lab Test 07/29/25  0819 07/28/25  0628 07/27/25  0724   WBC 7.6 7.3 11.9*   HGB 10.4* 9.8* 9.8*   MCV 87 87 85    360 345     Most Recent 3 BMP's:  Recent Labs   Lab Test 07/28/25  0628 07/26/25  1002  07/26/25  0625 07/25/25  1138     --  145 135   POTASSIUM 3.5  --  3.5 4.7   CHLORIDE 111*  --  112* 101   CO2 20*  --  22 20*   BUN 8.2  --  20.1 40.6*   CR 0.54  --  0.48* 0.60   ANIONGAP 14  --  11 14   HUGO 8.4*  --  7.6* 9.2   GLC 84 98 99 137*     Most Recent 2 LFT's:  Recent Labs   Lab Test 07/28/25  0628 07/27/25  1129   AST 64* 67*   * 112*   ALKPHOS 223* 220*   BILITOTAL 0.9 0.8   ,   Results for orders placed or performed during the hospital encounter of 07/25/25   Chest XR,  PA & LAT    Narrative    EXAM: XR CHEST 2 VIEWS  LOCATION: [facility]  DATE: 7/25/2025    INDICATION: syncope, chest discomfort  COMPARISON: 7/18/2025.      Impression    IMPRESSION: Negative chest. Coronary artery stent.   CT Abdomen Pelvis w Contrast    Narrative    EXAM: CT ABDOMEN PELVIS W CONTRAST  LOCATION: [facility]  DATE: 7/26/2025    INDICATION: Anemia, status post edel 7 days ago, negative EGD and colon.  COMPARISON: MRI 7/18/2025 and CT 7/18/2025.  TECHNIQUE: CT scan of the abdomen and pelvis was performed following injection of IV contrast. Multiplanar reformats were obtained. Dose reduction techniques were used.  CONTRAST: 73 mL Isovue 370    FINDINGS:   LOWER CHEST: Normal.    HEPATOBILIARY: Cholecystectomy. No complications visualized. The liver is unremarkable. No biliary dilatation.    PANCREAS: Normal.    SPLEEN: Normal.    ADRENAL GLANDS: Normal.    KIDNEYS/BLADDER: 7 x 5 mm nonobstructing stone left mid kidney. 2 mm nonobstructing stone right mid kidney. No ureteral stones or hydronephrosis. Subcentimeter cortical cysts left kidney require no additional follow-up. The urinary bladder is grossly   unremarkable.    BOWEL: There are a few colonic diverticula without evidence for diverticulitis.    LYMPH NODES: Normal.    VASCULATURE: Normal.    PELVIC ORGANS: Normal.    MUSCULOSKELETAL: Surgical changes lumbar spine.      Impression    IMPRESSION:   1.   Surgical changes of a recent cholecystectomy. No complications visualized.  2.  Bilateral nonobstructing renal calculi.  3.  There are a few colonic diverticula without diverticulitis.  4.  No lymphadenopathy.     US Upper Extremity Venous Duplex Left    Narrative    EXAM: US UPPER EXTREMITY VENOUS DUPLEX LEFT  LOCATION: [facility]  DATE: 7/27/2025    INDICATION: [M. Andrés swelling, prior IV site, r o DVT  COMPARISON: None.  TECHNIQUE: Venous Duplex ultrasound of the left upper extremity with (when possible) and without compression, augmentation, and duplex. Color flow and spectral Doppler with waveform analysis performed.    FINDINGS: Ultrasound includes evaluation of the internal jugular vein, innominate vein, subclavian vein, axillary vein, and brachial vein. The superficial cephalic and basilic veins were also evaluated where seen.     LEFT: No deep venous thrombosis. No superficial thrombophlebitis.       Impression    IMPRESSION:   1.  No deep or superficial venous thrombosis in the left upper extremity.       Discharge Medications      Review of your medicines        START taking        Dose / Directions   pantoprazole 40 MG EC tablet  Commonly known as: PROTONIX  Used for: Gastrointestinal hemorrhage with melena      Dose: 40 mg  Start taking on: July 30, 2025  Take 1 tablet (40 mg) by mouth every morning (before breakfast).  Quantity: 30 tablet  Refills: 1            CONTINUE these medicines which have NOT CHANGED        Dose / Directions   acetaminophen 500 MG tablet  Commonly known as: TYLENOL      Dose: 1,000 mg  Take 1,000 mg by mouth every 8 hours as needed for mild pain.  Refills: 0     alendronate 70 MG tablet  Commonly known as: FOSAMAX  Used for: Localized osteoporosis without current pathological fracture      TAKE 1 TABLET (70 MG TOTAL) BY MOUTH EVERY 7 DAYS.  Quantity: 12 tablet  Refills: 3     aspirin 81 MG EC tablet  Used for: Nonrheumatic aortic valve stenosis, Pure  hypercholesterolemia      Dose: 81 mg  Take 1 tablet (81 mg) by mouth daily. Start tomorrow.  Quantity: 30 tablet  Refills: 3     atorvastatin 80 MG tablet  Commonly known as: Lipitor  Used for: Mixed hyperlipidemia      Dose: 80 mg  Take 1 tablet (80 mg) by mouth daily.  Quantity: 90 tablet  Refills: 3     HUGO-MAG-ZINC OR      Dose: 500 mg  Take 500 mg by mouth 2 times daily  Refills: 0     clopidogrel 75 MG tablet  Commonly known as: PLAVIX  Used for: S/P drug eluting coronary stent placement, Pure hypercholesterolemia      Finish Brilinta.Then start, Clopidogrel 600 mg (8 tabs) by mouth once. Next morning, Clopidogrel 75 mg by mouth once daily.  Quantity: 90 tablet  Refills: 1     Hair Skin and Nails Formula Tabs      Dose: 1 tablet  Take 1 tablet by mouth daily.  Refills: 0     Lidocaine 4 % Patch  Commonly known as: LIDOCARE      Dose: 1 patch  Place 1 patch onto the skin daily as needed (arthritis pain). To prevent lidocaine toxicity, patient should be patch free for 12 hrs daily.  Refills: 0     MULTIVITAMIN PO      Dose: 1 tablet  Take 1 tablet by mouth daily.  Refills: 0     valACYclovir 1000 mg tablet  Commonly known as: VALTREX  Used for: HSV (herpes simplex virus) infection      TAKE TWO TABLETS BY MOUTH TWICE A DAY FOR ONE DAY AS NEEDED FOR COLD SORES  Quantity: 20 tablet  Refills: 1     vitamin D3 50 MCG (2000 UT) Caps      Dose: 2,000 Units  Take 2,000 Units by mouth every other day.  Refills: 0            STOP taking      meloxicam 7.5 MG tablet  Commonly known as: MOBIC        ticagrelor 90 MG tablet  Commonly known as: BRILINTA                  Where to get your medicines        These medications were sent to [facility] [address] [facility]  [address] [facility] [address]      Phone: [phone]   pantoprazole 40 MG EC tablet       Allergies   Allergies   Allergen Reactions    Hydrocodone Swelling

## 2025-07-30 ENCOUNTER — PATIENT OUTREACH (OUTPATIENT)
Dept: CARE COORDINATION | Facility: CLINIC | Age: 75
End: 2025-07-30
Payer: COMMERCIAL

## 2025-07-30 NOTE — PROGRESS NOTES
Clinic Care Coordination Contact  Transitions of Care Outreach  No chief complaint on file.      Most Recent Admission Date: 7/25/2025   Most Recent Admission Diagnosis: Upper GI bleed - K92.2  Anemia due to blood loss, acute - D62     Most Recent Discharge Date: 7/29/2025   Most Recent Discharge Diagnosis: Upper GI bleed - K92.2  Anemia due to blood loss, acute - D62  Aortic valve stenosis - I35.0  Epigastric pain - R10.13  Gastrointestinal hemorrhage with melena - K92.1     Transitions of Care Assessment    Discharge Assessment  How are you doing now that you are home?: Patient shares that she is doing well. Will call to schedule PCP appt today, declined assistance with this. Is just waiting to hear from GI on next steps. No questions/concerns or needs at this time. Thans writer for the call  How are your symptoms? (Red Flag symptoms escalate to triage hotline per guidelines): Improved  Do you know how to contact your clinic care team if you have future questions or changes to your health status? : Yes  Does the patient have their discharge instructions? : Yes  Does the patient have questions regarding their discharge instructions? : No  Were you started on any new medications or were there changes to any of your previous medications? : Yes  Does the patient have all of their medications?: Yes  Do you have questions regarding any of your medications? : No  Do you have all of your needed medical supplies or equipment (DME)?  (i.e. oxygen tank, CPAP, cane, etc.): Yes    Post-op ([facility]A Only)  If the patient had a surgery or procedure, do they have any questions for a nurse?: No    Post-op (Clinicians Only)  Did the patient have surgery or a procedure: Yes        Follow up Plan     Discharge Follow-Up  Discharge follow up appointment scheduled in alignment with recommended follow up timeframe or Transitions of Risk Category? (Low = within 30 days; Moderate= within 14 days; High= within 7 days): Yes  Discharge Follow  Up Appointment Date: 08/07/25  Discharge Follow Up Appointment Scheduled with?: Specialty Care Provider (Return TAVR. Will call to make PCP appt)    Future Appointments   Date Time Provider Department Center   8/4/2025  1:45 PM JONO HC ECHO STAFF [facility]TIMA [facility]   8/7/2025 12:30 [facility]   8/7/2025 12:50 PM Esau Lyons MD [facility]   8/7/2025  1:10 PM LATA HCC VALVE RN [facility]       Outpatient Plan as outlined on AVS reviewed with patient.    For any urgent concerns, please contact our 24 hour nurse triage line: [phone] ([phone])       RADHA Davidson

## 2025-07-31 NOTE — PROGRESS NOTES
2025         (Danni) Oniel   [address]  [place]  [place] [address]       (Danni) Oniel,  :  1950    I am writing to let you know the results of the tests that were done the other day.   Thank you for allowing [facility] the opportunity to take part in your healthcare.  At [facility] we strive to provide each patient with the finest gastroenterology care available.  We hope your experience was pleasant and informative.    The small bowel pill capsule study was normal. No lesions to explain the blood loss.  I will try to call you later today in case you have any questions.    I hope you are feeling well.        Thank you.    Electronically signed by:  Sarah Drew MD 2025 09:20 AM  Document generated by:  Sarah Drew MD  2025  If your provider ordered multiple tests; the results may not become available at the same time.  If multiple test results are received within 14 days of one another, you may receive a duplicate.  cc:  Maxine Bonner NP  cc:

## 2025-08-04 ENCOUNTER — DOCUMENTATION ONLY (OUTPATIENT)
Dept: LAB | Facility: CLINIC | Age: 75
End: 2025-08-04

## 2025-08-04 ENCOUNTER — HOSPITAL ENCOUNTER (OUTPATIENT)
Dept: CARDIOLOGY | Facility: HOSPITAL | Age: 75
Discharge: HOME | End: 2025-08-04
Attending: INTERNAL MEDICINE | Admitting: INTERNAL MEDICINE
Payer: COMMERCIAL

## 2025-08-04 ENCOUNTER — RESULTS FOLLOW-UP (OUTPATIENT)
Dept: CARDIOLOGY | Facility: CLINIC | Age: 75
End: 2025-08-04

## 2025-08-04 DIAGNOSIS — K92.2 UPPER GI BLEED: Primary | ICD-10-CM

## 2025-08-04 DIAGNOSIS — I35.0 NONRHEUMATIC AORTIC VALVE STENOSIS: ICD-10-CM

## 2025-08-04 DIAGNOSIS — I35.0 NONRHEUMATIC AORTIC VALVE STENOSIS: Primary | ICD-10-CM

## 2025-08-04 LAB — BI-PLANE LVEF ECHO: NORMAL

## 2025-08-04 PROCEDURE — 93306 TTE W/DOPPLER COMPLETE: CPT

## 2025-08-04 PROCEDURE — 93306 TTE W/DOPPLER COMPLETE: CPT | Mod: 26 | Performed by: INTERNAL MEDICINE

## 2025-08-06 ENCOUNTER — LAB (OUTPATIENT)
Dept: LAB | Facility: CLINIC | Age: 75
End: 2025-08-06
Payer: COMMERCIAL

## 2025-08-06 DIAGNOSIS — K92.2 UPPER GI BLEED: ICD-10-CM

## 2025-08-06 LAB
BASOPHILS # BLD AUTO: 0 10E3/UL (ref 0–0.2)
BASOPHILS NFR BLD AUTO: 0 %
EOSINOPHIL # BLD AUTO: 0.5 10E3/UL (ref 0–0.7)
EOSINOPHIL NFR BLD AUTO: 8 %
ERYTHROCYTE [DISTWIDTH] IN BLOOD BY AUTOMATED COUNT: 15 % (ref 10–15)
HCT VFR BLD AUTO: 34.5 % (ref 35–47)
HGB BLD-MCNC: 11.2 G/DL (ref 11.7–15.7)
IMM GRANULOCYTES # BLD: 0 10E3/UL
IMM GRANULOCYTES NFR BLD: 0 %
LYMPHOCYTES # BLD AUTO: 1.1 10E3/UL (ref 0.8–5.3)
LYMPHOCYTES NFR BLD AUTO: 16 %
MCH RBC QN AUTO: 28.9 PG (ref 26.5–33)
MCHC RBC AUTO-ENTMCNC: 32.5 G/DL (ref 31.5–36.5)
MCV RBC AUTO: 89 FL (ref 78–100)
MONOCYTES # BLD AUTO: 0.4 10E3/UL (ref 0–1.3)
MONOCYTES NFR BLD AUTO: 6 %
NEUTROPHILS # BLD AUTO: 4.8 10E3/UL (ref 1.6–8.3)
NEUTROPHILS NFR BLD AUTO: 70 %
PLATELET # BLD AUTO: 506 10E3/UL (ref 150–450)
RBC # BLD AUTO: 3.88 10E6/UL (ref 3.8–5.2)
WBC # BLD AUTO: 7 10E3/UL (ref 4–11)

## 2025-08-06 PROCEDURE — 36415 COLL VENOUS BLD VENIPUNCTURE: CPT

## 2025-08-06 PROCEDURE — 80053 COMPREHEN METABOLIC PANEL: CPT

## 2025-08-06 PROCEDURE — 85025 COMPLETE CBC W/AUTO DIFF WBC: CPT

## 2025-08-07 LAB
ALBUMIN SERPL BCG-MCNC: 4.1 G/DL (ref 3.5–5.2)
ALP SERPL-CCNC: 261 U/L (ref 40–150)
ALT SERPL W P-5'-P-CCNC: 55 U/L (ref 0–50)
ANION GAP SERPL CALCULATED.3IONS-SCNC: 13 MMOL/L (ref 7–15)
AST SERPL W P-5'-P-CCNC: 47 U/L (ref 0–45)
BILIRUB SERPL-MCNC: 0.6 MG/DL
BUN SERPL-MCNC: 12.4 MG/DL (ref 8–23)
CALCIUM SERPL-MCNC: 9.5 MG/DL (ref 8.8–10.4)
CHLORIDE SERPL-SCNC: 104 MMOL/L (ref 98–107)
CREAT SERPL-MCNC: 0.74 MG/DL (ref 0.51–0.95)
EGFRCR SERPLBLD CKD-EPI 2021: 84 ML/MIN/1.73M2
GLUCOSE SERPL-MCNC: 100 MG/DL (ref 70–99)
HCO3 SERPL-SCNC: 26 MMOL/L (ref 22–29)
POTASSIUM SERPL-SCNC: 4.3 MMOL/L (ref 3.4–5.3)
PROT SERPL-MCNC: 7 G/DL (ref 6.4–8.3)
SODIUM SERPL-SCNC: 143 MMOL/L (ref 135–145)

## 2025-08-11 ENCOUNTER — PATIENT OUTREACH (OUTPATIENT)
Dept: CARE COORDINATION | Facility: CLINIC | Age: 75
End: 2025-08-11
Payer: COMMERCIAL

## 2025-08-12 ENCOUNTER — OFFICE VISIT (OUTPATIENT)
Dept: INTERNAL MEDICINE | Facility: CLINIC | Age: 75
End: 2025-08-12
Payer: COMMERCIAL

## 2025-08-12 ENCOUNTER — TELEPHONE (OUTPATIENT)
Dept: UROLOGY | Facility: CLINIC | Age: 75
End: 2025-08-12

## 2025-08-12 ENCOUNTER — ANCILLARY PROCEDURE (OUTPATIENT)
Dept: GENERAL RADIOLOGY | Facility: CLINIC | Age: 75
End: 2025-08-12
Payer: COMMERCIAL

## 2025-08-12 VITALS
DIASTOLIC BLOOD PRESSURE: 77 MMHG | TEMPERATURE: 97.9 F | SYSTOLIC BLOOD PRESSURE: 128 MMHG | BODY MASS INDEX: 29.43 KG/M2 | RESPIRATION RATE: 16 BRPM | OXYGEN SATURATION: 97 % | HEART RATE: 73 BPM | HEIGHT: 59 IN | WEIGHT: 146 LBS

## 2025-08-12 DIAGNOSIS — D64.9 MILD ANEMIA: ICD-10-CM

## 2025-08-12 DIAGNOSIS — D64.9 ANEMIA, UNSPECIFIED TYPE: ICD-10-CM

## 2025-08-12 DIAGNOSIS — K81.0 ACUTE CHOLECYSTITIS: ICD-10-CM

## 2025-08-12 DIAGNOSIS — K92.2 UPPER GI BLEED: Primary | ICD-10-CM

## 2025-08-12 DIAGNOSIS — Z90.49 S/P CHOLECYSTECTOMY: ICD-10-CM

## 2025-08-12 DIAGNOSIS — R79.89 ELEVATED LFTS: ICD-10-CM

## 2025-08-12 PROCEDURE — 74019 RADEX ABDOMEN 2 VIEWS: CPT | Mod: TC | Performed by: RADIOLOGY

## 2025-08-12 PROCEDURE — 99495 TRANSJ CARE MGMT MOD F2F 14D: CPT

## 2025-08-12 PROCEDURE — 3078F DIAST BP <80 MM HG: CPT

## 2025-08-12 PROCEDURE — 3074F SYST BP LT 130 MM HG: CPT

## 2025-08-14 ENCOUNTER — TELEPHONE (OUTPATIENT)
Dept: SURGERY | Facility: CLINIC | Age: 75
End: 2025-08-14
Payer: COMMERCIAL

## 2025-08-25 ENCOUNTER — VIRTUAL VISIT (OUTPATIENT)
Dept: UROLOGY | Facility: CLINIC | Age: 75
End: 2025-08-25
Payer: COMMERCIAL

## 2025-08-25 ENCOUNTER — PATIENT OUTREACH (OUTPATIENT)
Dept: CARE COORDINATION | Facility: CLINIC | Age: 75
End: 2025-08-25

## 2025-08-25 DIAGNOSIS — N20.0 NEPHROLITHIASIS: ICD-10-CM

## 2025-08-25 DIAGNOSIS — Z79.02 ANTIPLATELET OR ANTITHROMBOTIC LONG-TERM USE: Primary | ICD-10-CM

## 2025-08-25 PROCEDURE — 98002 SYNCH AUDIO-VIDEO NEW MOD 45: CPT | Performed by: UROLOGY

## 2025-08-28 ENCOUNTER — TELEPHONE (OUTPATIENT)
Dept: UROLOGY | Facility: CLINIC | Age: 75
End: 2025-08-28
Payer: COMMERCIAL

## 2025-09-02 ENCOUNTER — TELEPHONE (OUTPATIENT)
Dept: UROLOGY | Facility: CLINIC | Age: 75
End: 2025-09-02
Payer: COMMERCIAL

## (undated) DEVICE — DAVINCI XI SEAL UNIVERSAL 5-12MM 470500

## (undated) DEVICE — CATH BALLOON NC EMERGE 4.00X20MM H7493926720400

## (undated) DEVICE — SU STRATAFIX PDS PLUS 3-0 SPIRAL SH 15CM SXPP1B420

## (undated) DEVICE — DRAPE IOBAN INCISE 23X17" 6650EZ

## (undated) DEVICE — SOLUTION WATER 1000ML BOTTLE R5000-01

## (undated) DEVICE — GLOVE PROTEXIS W/NEU-THERA 7.5  2D73TE75

## (undated) DEVICE — DAVINCI XI SUCTION IRRIGATOR ENDOWRIST 480299

## (undated) DEVICE — LINEN FULL SHEET 5511

## (undated) DEVICE — ENDO POUCH UNIVERSAL RETRIEVAL SYSTEM INZII 5MM CD003

## (undated) DEVICE — SU VICRYL 2-0 TIE 12X18" J905T

## (undated) DEVICE — ENDO TROCAR FIRST ENTRY KII FIOS Z-THRD 05X100MM CTF03

## (undated) DEVICE — PREP CHLORAPREP 26ML TINTED ORANGE  260815

## (undated) DEVICE — CATH BALLOON EMERGE 3.0X12MM H7493918912300

## (undated) DEVICE — BAG CLEAR TRASH 1.3M 39X33" P4040C

## (undated) DEVICE — KIT PROCEDURE W/CLEAN-A-SCOPE LINERS V2 200800

## (undated) DEVICE — DRAPE LAP PEDS DISP 29492

## (undated) DEVICE — ESU GROUND PAD ADULT W/CORD E7507

## (undated) DEVICE — CUSTOM PACK CORONARY SAN5BCRHEA

## (undated) DEVICE — SUCTION MANIFOLD NEPTUNE 2 SYS 4 PORT 0702-020-000

## (undated) DEVICE — GOWN IMPERVIOUS SPECIALTY XLG/XLONG 32474

## (undated) DEVICE — LUBRICANT INST ELECTROLUBE EL101

## (undated) DEVICE — TUBING SUCTION 6"X3/16" N56A

## (undated) DEVICE — GLOVE PROTEXIS BLUE W/NEU-THERA 6.5  2D73EB65

## (undated) DEVICE — LINEN ORTHO ACL PACK 5447

## (undated) DEVICE — SOL NACL 0.9% IRRIG 1000ML BOTTLE 2F7124

## (undated) DEVICE — GLOVE PROTEXIS BLUE W/NEU-THERA 8.0  2D73EB80

## (undated) DEVICE — UNDERPAD 36X30 PREMIERPRO MAX ABS NS LF 676111

## (undated) DEVICE — SU VICRYL+ 0 27 UR6 VLT VCP603H

## (undated) DEVICE — SU VICRYL 4-0 PS-2 18" UND J496H

## (undated) DEVICE — GUIDEWIRE FORTE FLOPPY J TOP 34949-05J

## (undated) DEVICE — EXCHANGE WIRE .035 260 STAR/JFC/035/260/ M001491681

## (undated) DEVICE — SU VICRYL+ 4-0 UNDYED PS-2 VCP496ZH

## (undated) DEVICE — SLEEVE TR BAND RADIAL COMPRESSION DEVICE 24CM TRB24-REG

## (undated) DEVICE — SU DERMABOND ADVANCED .7ML DNX12

## (undated) DEVICE — CATH DIAGNOSTIC RADIAL 5FR TIG 4.0

## (undated) DEVICE — LINEN TOWEL PACK X10 5473

## (undated) DEVICE — DRSG STERI STRIP 1/2X4" R1547

## (undated) DEVICE — KIT ENDO FIRST STEP DISINFECTANT 200ML W/POUCH EP-4

## (undated) DEVICE — Device

## (undated) DEVICE — CATH LAUNCHER 6FR JR 4.0 LA6JR40

## (undated) DEVICE — SOLUTION IV 0.9% NACL 1000ML E8000

## (undated) DEVICE — GOWN XLG DISP 9545

## (undated) DEVICE — VIAL DECANTER STERILE WHITE DYNJDEC06

## (undated) DEVICE — DAVINCI XI DRAPE ARM 470015

## (undated) DEVICE — KIT HAND CONTROL ACIST 014644 AR-P54

## (undated) DEVICE — ESU ELEC BLADE 2.75" COATED/INSULATED E1455

## (undated) DEVICE — ESU PENCIL W/HOLSTER E2350H

## (undated) DEVICE — MANIFOLD KIT ANGIO AUTOMATED 014613

## (undated) DEVICE — DEVICE INFLATION SYR W/ HEMOSTASIS VALVE 12IN EXT IN4904

## (undated) DEVICE — CLIP ENDO HEMO-LOC GREEN MED/LG 544230

## (undated) DEVICE — VALVE HEMOSTATIC WATCHDOG 8FR INNER LUMEN H74939343021

## (undated) DEVICE — LINEN HALF SHEET 5512

## (undated) DEVICE — BLADE KNIFE SURG 11 371111

## (undated) DEVICE — ELECTRODE DEFIB CADENCE 22550R

## (undated) DEVICE — SOL NACL 0.9% IRRIG 1000ML BOTTLE 07138-09

## (undated) DEVICE — SU VICRYL 3-0 SH 27" UND J416H

## (undated) DEVICE — EVAC SYSTEM CLEAR FLOW SC082500

## (undated) DEVICE — COVER FOOTSWITCH W/CINCH 20X24" 923267

## (undated) DEVICE — SYR ANGIOGRAPHY MULTIUSE KIT ACIST 014612

## (undated) DEVICE — SHTH INTRO 0.021IN ID 6FR DIA

## (undated) DEVICE — PACK MINOR CUSTOM RIDGES SBA32RMRMA

## (undated) DEVICE — DAVINCI XI OBTURATOR BLADELESS 8MM 470359

## (undated) RX ORDER — DEXAMETHASONE SODIUM PHOSPHATE 4 MG/ML
INJECTION, SOLUTION INTRA-ARTICULAR; INTRALESIONAL; INTRAMUSCULAR; INTRAVENOUS; SOFT TISSUE
Status: DISPENSED
Start: 2025-07-20

## (undated) RX ORDER — BUPIVACAINE HYDROCHLORIDE AND EPINEPHRINE 5; 5 MG/ML; UG/ML
INJECTION, SOLUTION EPIDURAL; INTRACAUDAL; PERINEURAL
Status: DISPENSED
Start: 2025-07-20

## (undated) RX ORDER — LIDOCAINE HYDROCHLORIDE 10 MG/ML
INJECTION, SOLUTION EPIDURAL; INFILTRATION; INTRACAUDAL; PERINEURAL
Status: DISPENSED
Start: 2025-07-26

## (undated) RX ORDER — BUPIVACAINE HYDROCHLORIDE 5 MG/ML
INJECTION, SOLUTION EPIDURAL; INTRACAUDAL
Status: DISPENSED
Start: 2019-11-05

## (undated) RX ORDER — TICAGRELOR 90 MG/1
TABLET, FILM COATED ORAL
Status: DISPENSED
Start: 2025-05-28

## (undated) RX ORDER — OXYCODONE HYDROCHLORIDE 5 MG/1
TABLET ORAL
Status: DISPENSED
Start: 2019-11-05

## (undated) RX ORDER — CEFAZOLIN SODIUM 2 G/100ML
INJECTION, SOLUTION INTRAVENOUS
Status: DISPENSED
Start: 2019-11-05

## (undated) RX ORDER — PROPOFOL 10 MG/ML
INJECTION, EMULSION INTRAVENOUS
Status: DISPENSED
Start: 2025-07-20

## (undated) RX ORDER — CEFAZOLIN SODIUM/WATER 2 G/20 ML
SYRINGE (ML) INTRAVENOUS
Status: DISPENSED
Start: 2025-07-20

## (undated) RX ORDER — KETOROLAC TROMETHAMINE 30 MG/ML
INJECTION, SOLUTION INTRAMUSCULAR; INTRAVENOUS
Status: DISPENSED
Start: 2025-07-20

## (undated) RX ORDER — ASPIRIN 325 MG
TABLET ORAL
Status: DISPENSED
Start: 2025-05-28

## (undated) RX ORDER — DIAZEPAM 5 MG/1
TABLET ORAL
Status: DISPENSED
Start: 2025-05-28

## (undated) RX ORDER — PROPOFOL 10 MG/ML
INJECTION, EMULSION INTRAVENOUS
Status: DISPENSED
Start: 2025-07-26

## (undated) RX ORDER — LIDOCAINE HYDROCHLORIDE 10 MG/ML
INJECTION, SOLUTION EPIDURAL; INFILTRATION; INTRACAUDAL; PERINEURAL
Status: DISPENSED
Start: 2019-11-05

## (undated) RX ORDER — ONDANSETRON 2 MG/ML
INJECTION INTRAMUSCULAR; INTRAVENOUS
Status: DISPENSED
Start: 2025-07-20

## (undated) RX ORDER — FENTANYL CITRATE 50 UG/ML
INJECTION, SOLUTION INTRAMUSCULAR; INTRAVENOUS
Status: DISPENSED
Start: 2025-05-28

## (undated) RX ORDER — METHADONE HYDROCHLORIDE 10 MG/ML
INJECTION, SOLUTION INTRAMUSCULAR; INTRAVENOUS; SUBCUTANEOUS
Status: DISPENSED
Start: 2025-07-20

## (undated) RX ORDER — LIDOCAINE HYDROCHLORIDE 10 MG/ML
INJECTION, SOLUTION EPIDURAL; INFILTRATION; INTRACAUDAL; PERINEURAL
Status: DISPENSED
Start: 2025-07-20

## (undated) RX ORDER — FENTANYL CITRATE-0.9 % NACL/PF 10 MCG/ML
PLASTIC BAG, INJECTION (ML) INTRAVENOUS
Status: DISPENSED
Start: 2025-07-26

## (undated) RX ORDER — PROPOFOL 10 MG/ML
INJECTION, EMULSION INTRAVENOUS
Status: DISPENSED
Start: 2025-07-25

## (undated) RX ORDER — INDOCYANINE GREEN AND WATER 25 MG
KIT INJECTION
Status: DISPENSED
Start: 2025-07-20

## (undated) RX ORDER — SEVOFLURANE 250 ML/250ML
LIQUID RESPIRATORY (INHALATION)
Status: DISPENSED
Start: 2025-07-20

## (undated) RX ORDER — FENTANYL CITRATE 50 UG/ML
INJECTION, SOLUTION INTRAMUSCULAR; INTRAVENOUS
Status: DISPENSED
Start: 2019-11-05